# Patient Record
Sex: MALE | Race: BLACK OR AFRICAN AMERICAN | NOT HISPANIC OR LATINO | Employment: OTHER | URBAN - METROPOLITAN AREA
[De-identification: names, ages, dates, MRNs, and addresses within clinical notes are randomized per-mention and may not be internally consistent; named-entity substitution may affect disease eponyms.]

---

## 2018-04-19 ENCOUNTER — OFFICE VISIT (OUTPATIENT)
Dept: URGENT CARE | Facility: CLINIC | Age: 63
End: 2018-04-19
Payer: COMMERCIAL

## 2018-04-19 VITALS
HEART RATE: 88 BPM | TEMPERATURE: 98 F | OXYGEN SATURATION: 99 % | HEIGHT: 66 IN | BODY MASS INDEX: 30.53 KG/M2 | WEIGHT: 190 LBS | RESPIRATION RATE: 18 BRPM | DIASTOLIC BLOOD PRESSURE: 76 MMHG | SYSTOLIC BLOOD PRESSURE: 152 MMHG

## 2018-04-19 DIAGNOSIS — M54.12 CERVICAL RADICULOPATHY: Primary | ICD-10-CM

## 2018-04-19 DIAGNOSIS — R07.9 CHEST PAIN, UNSPECIFIED TYPE: ICD-10-CM

## 2018-04-19 DIAGNOSIS — Z76.0 ENCOUNTER FOR MEDICATION REFILL: ICD-10-CM

## 2018-04-19 PROCEDURE — 96372 THER/PROPH/DIAG INJ SC/IM: CPT | Mod: S$GLB,,, | Performed by: EMERGENCY MEDICINE

## 2018-04-19 PROCEDURE — 99203 OFFICE O/P NEW LOW 30 MIN: CPT | Mod: 25,S$GLB,, | Performed by: NURSE PRACTITIONER

## 2018-04-19 RX ORDER — PRAVASTATIN SODIUM 20 MG/1
20 TABLET ORAL NIGHTLY
Qty: 30 TABLET | Refills: 0 | Status: SHIPPED | OUTPATIENT
Start: 2018-04-19 | End: 2018-05-28 | Stop reason: ALTCHOICE

## 2018-04-19 RX ORDER — TAMSULOSIN HYDROCHLORIDE 0.4 MG/1
0.4 CAPSULE ORAL DAILY
Qty: 30 CAPSULE | Refills: 0 | Status: SHIPPED | OUTPATIENT
Start: 2018-04-19 | End: 2018-04-19 | Stop reason: SDUPTHER

## 2018-04-19 RX ORDER — BETAMETHASONE SODIUM PHOSPHATE AND BETAMETHASONE ACETATE 3; 3 MG/ML; MG/ML
9 INJECTION, SUSPENSION INTRA-ARTICULAR; INTRALESIONAL; INTRAMUSCULAR; SOFT TISSUE
Status: COMPLETED | OUTPATIENT
Start: 2018-04-19 | End: 2018-04-19

## 2018-04-19 RX ORDER — PRAVASTATIN SODIUM 20 MG/1
20 TABLET ORAL NIGHTLY
Qty: 30 TABLET | Refills: 0 | Status: SHIPPED | OUTPATIENT
Start: 2018-04-19 | End: 2018-04-19 | Stop reason: SDUPTHER

## 2018-04-19 RX ORDER — AMLODIPINE BESYLATE 5 MG/1
5 TABLET ORAL DAILY
Qty: 30 TABLET | Refills: 0 | Status: SHIPPED | OUTPATIENT
Start: 2018-04-19 | End: 2018-06-22 | Stop reason: SDUPTHER

## 2018-04-19 RX ORDER — METHOCARBAMOL 500 MG/1
500 TABLET, FILM COATED ORAL 4 TIMES DAILY PRN
Qty: 30 TABLET | Refills: 0 | Status: SHIPPED | OUTPATIENT
Start: 2018-04-19 | End: 2018-04-29

## 2018-04-19 RX ORDER — LORATADINE 10 MG/1
10 TABLET ORAL DAILY
Qty: 30 TABLET | Refills: 0 | Status: SHIPPED | OUTPATIENT
Start: 2018-04-19 | End: 2018-12-19 | Stop reason: ALTCHOICE

## 2018-04-19 RX ORDER — KETOROLAC TROMETHAMINE 30 MG/ML
30 INJECTION, SOLUTION INTRAMUSCULAR; INTRAVENOUS
Status: COMPLETED | OUTPATIENT
Start: 2018-04-19 | End: 2018-04-19

## 2018-04-19 RX ORDER — AMLODIPINE BESYLATE 5 MG/1
5 TABLET ORAL DAILY
Qty: 30 TABLET | Refills: 0 | Status: SHIPPED | OUTPATIENT
Start: 2018-04-19 | End: 2018-04-19 | Stop reason: SDUPTHER

## 2018-04-19 RX ORDER — NAPROXEN 500 MG/1
500 TABLET ORAL 2 TIMES DAILY WITH MEALS
Qty: 20 TABLET | Refills: 0 | Status: SHIPPED | OUTPATIENT
Start: 2018-04-19 | End: 2018-04-29

## 2018-04-19 RX ORDER — METHYLPREDNISOLONE 4 MG/1
TABLET ORAL
Qty: 1 PACKAGE | Refills: 0 | Status: SHIPPED | OUTPATIENT
Start: 2018-04-19 | End: 2018-04-19 | Stop reason: SDUPTHER

## 2018-04-19 RX ORDER — METHYLPREDNISOLONE 4 MG/1
TABLET ORAL
Qty: 1 PACKAGE | Refills: 0 | Status: SHIPPED | OUTPATIENT
Start: 2018-04-19 | End: 2018-05-21

## 2018-04-19 RX ORDER — TAMSULOSIN HYDROCHLORIDE 0.4 MG/1
0.4 CAPSULE ORAL DAILY
Qty: 30 CAPSULE | Refills: 0 | Status: SHIPPED | OUTPATIENT
Start: 2018-04-19 | End: 2018-07-23 | Stop reason: SDUPTHER

## 2018-04-19 RX ORDER — METHOCARBAMOL 500 MG/1
500 TABLET, FILM COATED ORAL 4 TIMES DAILY PRN
Qty: 30 TABLET | Refills: 0 | Status: SHIPPED | OUTPATIENT
Start: 2018-04-19 | End: 2018-04-19 | Stop reason: SDUPTHER

## 2018-04-19 RX ORDER — NAPROXEN 500 MG/1
500 TABLET ORAL 2 TIMES DAILY WITH MEALS
Qty: 20 TABLET | Refills: 0 | Status: SHIPPED | OUTPATIENT
Start: 2018-04-19 | End: 2018-04-19 | Stop reason: SDUPTHER

## 2018-04-19 RX ADMIN — BETAMETHASONE SODIUM PHOSPHATE AND BETAMETHASONE ACETATE 9 MG: 3; 3 INJECTION, SUSPENSION INTRA-ARTICULAR; INTRALESIONAL; INTRAMUSCULAR; SOFT TISSUE at 02:04

## 2018-04-19 RX ADMIN — KETOROLAC TROMETHAMINE 30 MG: 30 INJECTION, SOLUTION INTRAMUSCULAR; INTRAVENOUS at 02:04

## 2018-04-19 NOTE — PATIENT INSTRUCTIONS
EKG was normal today.   Medication refilled for 1 month until you are seen in primary care. Please attend this visit.     Gas x to help with gas.   Zantac OTC for any acid reflux.     Cervical radiculopathy:   Start Naprosyn and Medrol dose maryam tomorrow. Take Naprosyn with food.   Robaxin may cause drowsiness.     Please return here or go to the Emergency Department for any concerns or worsening of condition.  If you were prescribed antibiotics, please take them to completion.  If you were prescribed a narcotic medication, do not drive or operate heavy equipment or machinery while taking these medications.  Please follow up with your primary care doctor or specialist as needed.    If you  smoke, please stop smoking.      Pinched Nerve in the Neck  A pinched nerve in the neck (cervical radiculopathy) is caused when the nerve that goes from the spinal cord to the neck or arm is irritated or has pressure on it. This may be caused by a bulging spinal disk. A spinal disk is the cushion between each spinal bone. Or it may be caused by a narrowing of the spinal joint because of osteoarthritis and wear and tear from repeated injuries.  A pinched nerve can cause numbness, tingling, deep aching, or electrical shooting pain from the side of the neck all the way down to the fingers on one side.  A pinched nerve may start after a sudden turning or bending force (such as in a car accident) or after a simple awkward movement. In either case, muscle spasm is commonly present and adds to the pain.  Home care  Follow these guidelines when caring for yourself at home:  · Rest and relax the muscles. Use a comfortable pillow that supports your head and keeps your spine in a natural (neutral) position. Your head shouldnt be tilted forward or backward. A rolled-up towel may help for a custom fit. When standing or sitting, keep your neck in line with your body. Keep your head up and shoulders down. Stay away from activities that require  you to move your neck a lot.  · You can use heat and massage to help ease the pain. Take a hot shower or bath, or use a heating pad. You can also use a cold pack for relief. You can make a cold pack by wrapping a plastic bag of crushed or cubed ice in a thin towel. Try both heat and cold, and use the method that feels best. Do this for 20 minutes several times a day.  · You may use acetaminophen or ibuprofen to control pain, unless another pain medicine was prescribed. If you have chronic liver or kidney disease, talk with your healthcare provider before using these medicines. Also talk with your provider if youve had a stomach ulcer or gastrointestinal bleeding.  · Reduce stress. Stress can make it longer for your pain to go away.  · Do any exercises or stretches that were given to you as part of your discharge plan.  · Wear a soft collar, if prescribed.  · Physical therapy and massages are known to help.  · You may need surgery for a more serious injury.  Follow-up care  Follow up with your healthcare provider, or as advised, if you dont start to get better after 1 week. You may need more tests. Tell your provider about any fever, chills, or weight loss.  If X-rays were taken, a radiologist may look at them. You will be told of any new findings that may affect your care.  When to seek medical advice  Call your healthcare provider right away if any of these occur:  · Pain becomes worse even after taking prescribed pain medicine  · Weakness in the arm or legs  · Numbness in the arm gets worse  · Trouble breathing or swallowing  Date Last Reviewed: 5/1/2017 © 2000-2017 [employer]. [address] [place] [address], [place] [address]. All rights reserved. This information is not intended as a substitute for professional medical care. Always follow your healthcare professional's instructions.        Neck Sprain or Strain  A sudden force that causes turning or bending of the neck can cause sprain or strain. An  example would be the force from a car accident. This can stretch or tear muscles called a strain. It can also stretch or tear ligaments called a sprain. Either of these can cause neck pain. Sometimes neck pain occurs after a simple awkward movement. In either case, muscle spasm is commonly present and contributes to the pain.     Unless you had a forceful physical injury (for example, a car accident or fall), X-rays are usually not ordered for the initial evaluation of neck pain. If pain continues and dose not respond to medical treatment, X-rays and other tests may be performed at a later time.  Home care  · You may feel more soreness and spasm the first few days after the injury. Rest until symptoms begin to improve.  · When lying down, use a comfortable pillow or a rolled towel that supports the head and keeps the spine in a neutral position. The position of the head should not be tilted forward or backward.  · Apply an ice pack over the injured area for 15 to 20 minutes every 3 to 6 hours. You should do this for the first 24 to 48 hours. You can make an ice pack by filling a plastic bag that seals at the top with ice cubes and then wrapping it with a thin towel. After 48 hours, apply heat (warm shower or warm bath) for 15 to 20 minutes several times a day, or alternate ice and heat.  · You may use over-the-counter pain medicine to control pain, unless another pain medicine was prescribed. If you have chronic liver or kidney disease or ever had a stomach ulcer or GI bleeding, talk with your healthcare provider before using these medicines.  · If a soft cervical collar was prescribed, it should be worn only for periods of increased pain. It should not be worn for more than 3 hours a day, or for a period longer than 1 to 2 weeks.  Follow-up care  Follow up with your healthcare provider as directed. Physical therapy may be needed.  Sometimes fractures dont show up on the first X-ray. Bruises and sprains can  sometimes hurt as much as a fracture. These injuries can take time to heal completely. If your symptoms dont improve or they get worse, talk with your healthcare provider. You may need a repeat X-ray or other tests. If X-rays were taken, you will be told of any new findings that may affect your care.  Call 911  Call 911 if you have:  · Neck swelling, difficulty or painful swallowing  · Difficulty breathing  · Chest pain  When to seek medical advice  Call your healthcare provider right away if any of these occur:  · Pain becomes worse or spreads into your arms  · Weakness or numbness in one or both arms  Date Last Reviewed: 11/19/2015 © 2000-2017 [employer]. [address] [place] [address], [place] [address]. All rights reserved. This information is not intended as a substitute for professional medical care. Always follow your healthcare professional's instructions.        Noncardiac Chest Pain    Based on your visit today, the healthcare provider doesnt know what is causing your chest pain. In most cases, people who come to the emergency department with chest pain dont have a problem with their heart. Instead, the pain is caused by other conditions. It's important for the healthcare team to be sure you are not having a life threatening cause for chest pain such as a heart attack, blood clot in the lungs, collapsed lung, ruptured esophagus, or tearing of the aorta. Once these major causes have been ruled out, you may have further evaluation for non-heart causes of chest pain. These may be problems with the lungs, muscles, bones, digestive tract, nerves, or mental health.  Lung problems  · Inflammation around the lungs (pleurisy)  · Collapsed lung (pneumothorax)  · Fluid around the lungs (pleural effusion)  · Lung cancer (a rare cause of chest pain)  Muscle or bone problems  · Inflamed cartilage between the ribs (costochondritis)  · Fibromyalgia  · Rheumatoid arthritis  · Chest wall strain  Digestive system  problems  · Reflux  · Stomach ulcer  · Spasms of the esophagus  · Gall stones  · Gallbladder inflammation  Mental health conditions  · Panic or anxiety attacks  · Emotional distress  Your condition doesnt seem serious and your pain doesnt appear to be coming from your heart. But sometimes the signs of a serious problem take more time to appear. Watch for the warning signs listed below.  Home care  Follow these guidelines when caring for yourself at home:  · Rest today and avoid strenuous activity.  · Take any prescribed medicine as directed.  Follow-up care  Follow up with your healthcare provider, or as advised, if you dont start to feel better within 24 hours.  When to seek medical advice  Call your healthcare provider right away if any of these occur:  · A change in the type of pain. Call if it feels different, becomes more serious, lasts longer, or begins to spread into your shoulder, arm, neck, jaw, or back.  · Shortness of breath  · You feel more pain when you breathe  · Cough with dark-colored mucus or blood  · Weakness, dizziness, or fainting  · Fever of 100.4ºF (38ºC) or higher, or as directed by your healthcare provider  · Swelling, pain, or redness in one leg  Date Last Reviewed: 12/1/2016  © 1880-1750 [employer]. [address] [place] [address], [place] [address]. All rights reserved. This information is not intended as a substitute for professional medical care. Always follow your healthcare professional's instructions.

## 2018-04-19 NOTE — PROGRESS NOTES
"Subjective:       Patient ID: Aaron Guerra is a 63 y.o. male.    Vitals:  height is 5' 6" (1.676 m) and weight is 86.2 kg (190 lb). His oral temperature is 97.8 °F (36.6 °C). His blood pressure is 152/76 (abnormal) and his pulse is 88. His respiration is 18 and oxygen saturation is 99%.     Chief Complaint: Arm Pain (left arm)    Patient states the pain came at night, feels pain in muscles, does not spread to any other part of body, hurts when left arm is abducted. Onset 3/30/2018    He is also c.o intermittent left sided cp that is relieved with belching.       Arm Pain    The incident occurred more than 1 week ago. There was no injury mechanism. The pain is present in the left elbow and left shoulder (left humerus). The quality of the pain is described as aching. The pain does not radiate. The pain is at a severity of 6/10. The pain is moderate. The pain has been constant since the incident. Associated symptoms include chest pain (left sided CP- achy- improves with belching ) and muscle weakness. The symptoms are aggravated by movement and lifting. He has tried nothing (aspirin alleve) for the symptoms. The treatment provided no relief.     Review of Systems   Constitution: Negative for chills, fever, weakness and malaise/fatigue.   HENT: Negative for sore throat.    Eyes: Negative for blurred vision.   Cardiovascular: Positive for chest pain (left sided CP- achy- improves with belching ). Negative for dyspnea on exertion and leg swelling.   Respiratory: Negative for shortness of breath.    Skin: Negative for rash.   Musculoskeletal: Positive for joint pain and myalgias. Negative for back pain.   Gastrointestinal: Positive for flatus. Negative for abdominal pain, diarrhea, nausea and vomiting.   Neurological: Negative for headaches.   Psychiatric/Behavioral: The patient is not nervous/anxious.        Objective:      Physical Exam   Constitutional: He is oriented to person, place, and time. He appears well-developed " and well-nourished. No distress.   HENT:   Head: Normocephalic and atraumatic.   Right Ear: External ear normal.   Left Ear: External ear normal.   Nose: Nose normal.   Mouth/Throat: Mucous membranes are normal.   Eyes: Conjunctivae, EOM and lids are normal. Pupils are equal, round, and reactive to light.   Neck: Trachea normal and full passive range of motion without pain. Neck supple. Muscular tenderness present. No spinous process tenderness present. No neck rigidity. Decreased range of motion (pain with neck movements. ) present. No edema and no erythema present.       Cardiovascular: Normal rate, regular rhythm and normal heart sounds.    Pulmonary/Chest: Effort normal and breath sounds normal. No respiratory distress.   Abdominal: Soft. Normal appearance and bowel sounds are normal. He exhibits no distension, no abdominal bruit, no pulsatile midline mass and no mass. There is no tenderness. There is no rigidity, no rebound, no guarding, no tenderness at McBurney's point and negative Dodson's sign.   Belching during the visit.    Musculoskeletal: He exhibits no edema.        Cervical back: He exhibits decreased range of motion (pain ), pain and spasm. He exhibits no bony tenderness.        Back:    Pain to left sdied neck radiates down to left shoulder and left elbow.    Neurological: He is alert and oriented to person, place, and time. He has normal strength.   Skin: Skin is warm, dry and intact. He is not diaphoretic. No pallor.   Psychiatric: He has a normal mood and affect. His speech is normal and behavior is normal. Judgment and thought content normal. Cognition and memory are normal.   Nursing note and vitals reviewed.      Assessment:       1. Cervical radiculopathy    2. Encounter for medication refill    3. Chest pain, unspecified type        Plan:         Cervical radiculopathy  -     ketorolac injection 30 mg; Inject 1 mL (30 mg total) into the muscle one time.  -     betamethasone  "acetate-betamethasone sodium phosphate injection 9 mg; Inject 1.5 mLs (9 mg total) into the muscle one time.  -     methocarbamol (ROBAXIN) 500 MG Tab; Take 1 tablet (500 mg total) by mouth 4 (four) times daily as needed (muscle spasm).  Dispense: 30 tablet; Refill: 0  -     methylPREDNISolone (MEDROL, KASH,) 4 mg tablet; use as directed  Dispense: 1 Package; Refill: 0  -     naproxen (NAPROSYN) 500 MG tablet; Take 1 tablet (500 mg total) by mouth 2 (two) times daily with meals.  Dispense: 20 tablet; Refill: 0    Encounter for medication refill  -     pravastatin (PRAVACHOL) 20 MG tablet; Take 1 tablet (20 mg total) by mouth every evening.  Dispense: 30 tablet; Refill: 0  -     amLODIPine (NORVASC) 5 MG tablet; Take 1 tablet (5 mg total) by mouth once daily.  Dispense: 30 tablet; Refill: 0  -     tamsulosin (FLOMAX) 0.4 mg Cp24; Take 1 capsule (0.4 mg total) by mouth once daily.  Dispense: 30 capsule; Refill: 0  -     loratadine (CLARITIN) 10 mg tablet; Take 1 tablet (10 mg total) by mouth once daily.  Dispense: 30 tablet; Refill: 0    Chest pain, unspecified type  -     EKG 12-lead      EKG in clinic today shows NSR. No ST depression or elevation.   HR 79.   No EKG to compare it to.     Discussed that CP is likely not cardiac related. No SOB. Pain is relieved with belching. + belching during exam. No abd pain, N/V/D or fever.  Described as "achy pain" Discussed follow up if CP changes  In characteristic or intensity.   Discuss this is likely r/t gas.     Medications refilled today per patient request. He reports he is establishing care with PCP next month. Appointment visibile in [employer]. Has medication bottles with him today. verified prior to sending rx. Discussed PCP follow up.       Patient Instructions   EKG was normal today.   Medication refilled for 1 month until you are seen in primary care. Please attend this visit.     Gas x to help with gas.   Zantac OTC for any acid reflux.     Cervical radiculopathy: "   Start Naprosyn and Medrol dose maryam tomorrow. Take Naprosyn with food.   Robaxin may cause drowsiness.     Please return here or go to the Emergency Department for any concerns or worsening of condition.  If you were prescribed antibiotics, please take them to completion.  If you were prescribed a narcotic medication, do not drive or operate heavy equipment or machinery while taking these medications.  Please follow up with your primary care doctor or specialist as needed.    If you  smoke, please stop smoking.      Pinched Nerve in the Neck  A pinched nerve in the neck (cervical radiculopathy) is caused when the nerve that goes from the spinal cord to the neck or arm is irritated or has pressure on it. This may be caused by a bulging spinal disk. A spinal disk is the cushion between each spinal bone. Or it may be caused by a narrowing of the spinal joint because of osteoarthritis and wear and tear from repeated injuries.  A pinched nerve can cause numbness, tingling, deep aching, or electrical shooting pain from the side of the neck all the way down to the fingers on one side.  A pinched nerve may start after a sudden turning or bending force (such as in a car accident) or after a simple awkward movement. In either case, muscle spasm is commonly present and adds to the pain.  Home care  Follow these guidelines when caring for yourself at home:  · Rest and relax the muscles. Use a comfortable pillow that supports your head and keeps your spine in a natural (neutral) position. Your head shouldnt be tilted forward or backward. A rolled-up towel may help for a custom fit. When standing or sitting, keep your neck in line with your body. Keep your head up and shoulders down. Stay away from activities that require you to move your neck a lot.  · You can use heat and massage to help ease the pain. Take a hot shower or bath, or use a heating pad. You can also use a cold pack for relief. You can make a cold pack by  wrapping a plastic bag of crushed or cubed ice in a thin towel. Try both heat and cold, and use the method that feels best. Do this for 20 minutes several times a day.  · You may use acetaminophen or ibuprofen to control pain, unless another pain medicine was prescribed. If you have chronic liver or kidney disease, talk with your healthcare provider before using these medicines. Also talk with your provider if youve had a stomach ulcer or gastrointestinal bleeding.  · Reduce stress. Stress can make it longer for your pain to go away.  · Do any exercises or stretches that were given to you as part of your discharge plan.  · Wear a soft collar, if prescribed.  · Physical therapy and massages are known to help.  · You may need surgery for a more serious injury.  Follow-up care  Follow up with your healthcare provider, or as advised, if you dont start to get better after 1 week. You may need more tests. Tell your provider about any fever, chills, or weight loss.  If X-rays were taken, a radiologist may look at them. You will be told of any new findings that may affect your care.  When to seek medical advice  Call your healthcare provider right away if any of these occur:  · Pain becomes worse even after taking prescribed pain medicine  · Weakness in the arm or legs  · Numbness in the arm gets worse  · Trouble breathing or swallowing  Date Last Reviewed: 5/1/2017 © 2000-2017 [employer]. [address] [place] [id number]. All rights reserved. This information is not intended as a substitute for professional medical care. Always follow your healthcare professional's instructions.        Neck Sprain or Strain  A sudden force that causes turning or bending of the neck can cause sprain or strain. An example would be the force from a car accident. This can stretch or tear muscles called a strain. It can also stretch or tear ligaments called a sprain. Either of these can cause neck pain. Sometimes neck  pain occurs after a simple awkward movement. In either case, muscle spasm is commonly present and contributes to the pain.     Unless you had a forceful physical injury (for example, a car accident or fall), X-rays are usually not ordered for the initial evaluation of neck pain. If pain continues and dose not respond to medical treatment, X-rays and other tests may be performed at a later time.  Home care  · You may feel more soreness and spasm the first few days after the injury. Rest until symptoms begin to improve.  · When lying down, use a comfortable pillow or a rolled towel that supports the head and keeps the spine in a neutral position. The position of the head should not be tilted forward or backward.  · Apply an ice pack over the injured area for 15 to 20 minutes every 3 to 6 hours. You should do this for the first 24 to 48 hours. You can make an ice pack by filling a plastic bag that seals at the top with ice cubes and then wrapping it with a thin towel. After 48 hours, apply heat (warm shower or warm bath) for 15 to 20 minutes several times a day, or alternate ice and heat.  · You may use over-the-counter pain medicine to control pain, unless another pain medicine was prescribed. If you have chronic liver or kidney disease or ever had a stomach ulcer or GI bleeding, talk with your healthcare provider before using these medicines.  · If a soft cervical collar was prescribed, it should be worn only for periods of increased pain. It should not be worn for more than 3 hours a day, or for a period longer than 1 to 2 weeks.  Follow-up care  Follow up with your healthcare provider as directed. Physical therapy may be needed.  Sometimes fractures dont show up on the first X-ray. Bruises and sprains can sometimes hurt as much as a fracture. These injuries can take time to heal completely. If your symptoms dont improve or they get worse, talk with your healthcare provider. You may need a repeat X-ray or other  tests. If X-rays were taken, you will be told of any new findings that may affect your care.  Call 911  Call 911 if you have:  · Neck swelling, difficulty or painful swallowing  · Difficulty breathing  · Chest pain  When to seek medical advice  Call your healthcare provider right away if any of these occur:  · Pain becomes worse or spreads into your arms  · Weakness or numbness in one or both arms  Date Last Reviewed: 11/19/2015  © 2012-7598 [employer]. [address] [place] [id number]. All rights reserved. This information is not intended as a substitute for professional medical care. Always follow your healthcare professional's instructions.        Noncardiac Chest Pain    Based on your visit today, the healthcare provider doesnt know what is causing your chest pain. In most cases, people who come to the emergency department with chest pain dont have a problem with their heart. Instead, the pain is caused by other conditions. It's important for the healthcare team to be sure you are not having a life threatening cause for chest pain such as a heart attack, blood clot in the lungs, collapsed lung, ruptured esophagus, or tearing of the aorta. Once these major causes have been ruled out, you may have further evaluation for non-heart causes of chest pain. These may be problems with the lungs, muscles, bones, digestive tract, nerves, or mental health.  Lung problems  · Inflammation around the lungs (pleurisy)  · Collapsed lung (pneumothorax)  · Fluid around the lungs (pleural effusion)  · Lung cancer (a rare cause of chest pain)  Muscle or bone problems  · Inflamed cartilage between the ribs (costochondritis)  · Fibromyalgia  · Rheumatoid arthritis  · Chest wall strain  Digestive system problems  · Reflux  · Stomach ulcer  · Spasms of the esophagus  · Gall stones  · Gallbladder inflammation  Mental health conditions  · Panic or anxiety attacks  · Emotional distress  Your condition doesnt seem  serious and your pain doesnt appear to be coming from your heart. But sometimes the signs of a serious problem take more time to appear. Watch for the warning signs listed below.  Home care  Follow these guidelines when caring for yourself at home:  · Rest today and avoid strenuous activity.  · Take any prescribed medicine as directed.  Follow-up care  Follow up with your healthcare provider, or as advised, if you dont start to feel better within 24 hours.  When to seek medical advice  Call your healthcare provider right away if any of these occur:  · A change in the type of pain. Call if it feels different, becomes more serious, lasts longer, or begins to spread into your shoulder, arm, neck, jaw, or back.  · Shortness of breath  · You feel more pain when you breathe  · Cough with dark-colored mucus or blood  · Weakness, dizziness, or fainting  · Fever of 100.4ºF (38ºC) or higher, or as directed by your healthcare provider  · Swelling, pain, or redness in one leg  Date Last Reviewed: 12/1/2016  © 6757-5484 [employer]. [address] [place] [address], [place] [address]. All rights reserved. This information is not intended as a substitute for professional medical care. Always follow your healthcare professional's instructions.

## 2018-05-21 ENCOUNTER — HOSPITAL ENCOUNTER (OUTPATIENT)
Dept: RADIOLOGY | Facility: HOSPITAL | Age: 63
Discharge: HOME | End: 2018-05-21
Attending: FAMILY MEDICINE
Payer: COMMERCIAL

## 2018-05-21 ENCOUNTER — OFFICE VISIT (OUTPATIENT)
Dept: FAMILY MEDICINE | Facility: CLINIC | Age: 63
End: 2018-05-21
Attending: FAMILY MEDICINE
Payer: COMMERCIAL

## 2018-05-21 VITALS
BODY MASS INDEX: 30.65 KG/M2 | WEIGHT: 195.31 LBS | HEIGHT: 67 IN | OXYGEN SATURATION: 97 % | HEART RATE: 84 BPM | DIASTOLIC BLOOD PRESSURE: 88 MMHG | SYSTOLIC BLOOD PRESSURE: 132 MMHG

## 2018-05-21 DIAGNOSIS — I10 ESSENTIAL HYPERTENSION: Primary | ICD-10-CM

## 2018-05-21 DIAGNOSIS — M75.22 BICIPITAL TENDINITIS OF LEFT SHOULDER: ICD-10-CM

## 2018-05-21 DIAGNOSIS — M47.22 CERVICAL SPONDYLOSIS WITH RADICULOPATHY: ICD-10-CM

## 2018-05-21 DIAGNOSIS — E78.00 HYPERCHOLESTEREMIA: ICD-10-CM

## 2018-05-21 DIAGNOSIS — J30.9 ALLERGIC RHINITIS, UNSPECIFIED SEASONALITY, UNSPECIFIED TRIGGER: ICD-10-CM

## 2018-05-21 DIAGNOSIS — K63.5 POLYP OF COLON, UNSPECIFIED PART OF COLON, UNSPECIFIED TYPE: ICD-10-CM

## 2018-05-21 DIAGNOSIS — R35.1 BENIGN PROSTATIC HYPERPLASIA WITH NOCTURIA: ICD-10-CM

## 2018-05-21 DIAGNOSIS — Z00.00 LABORATORY EXAM ORDERED AS PART OF ROUTINE GENERAL MEDICAL EXAMINATION: ICD-10-CM

## 2018-05-21 DIAGNOSIS — N40.1 BENIGN PROSTATIC HYPERPLASIA WITH NOCTURIA: ICD-10-CM

## 2018-05-21 DIAGNOSIS — Z11.59 NEED FOR HEPATITIS C SCREENING TEST: ICD-10-CM

## 2018-05-21 DIAGNOSIS — R06.81 WITNESSED APNEIC SPELLS: ICD-10-CM

## 2018-05-21 DIAGNOSIS — R06.83 SNORING: ICD-10-CM

## 2018-05-21 LAB
CREAT UR-MCNC: 149 MG/DL
MICROALBUMIN UR DL<=1MG/L-MCNC: 9 UG/ML
MICROALBUMIN/CREATININE RATIO: 6 UG/MG

## 2018-05-21 PROCEDURE — 3075F SYST BP GE 130 - 139MM HG: CPT | Mod: CPTII,S$GLB,, | Performed by: FAMILY MEDICINE

## 2018-05-21 PROCEDURE — 90715 TDAP VACCINE 7 YRS/> IM: CPT | Mod: S$GLB,,, | Performed by: FAMILY MEDICINE

## 2018-05-21 PROCEDURE — 72040 X-RAY EXAM NECK SPINE 2-3 VW: CPT | Mod: 26,,, | Performed by: RADIOLOGY

## 2018-05-21 PROCEDURE — 73030 X-RAY EXAM OF SHOULDER: CPT | Mod: 26,LT,, | Performed by: RADIOLOGY

## 2018-05-21 PROCEDURE — 99999 PR PBB SHADOW E&M-EST. PATIENT-LVL IV: CPT | Mod: PBBFAC,,, | Performed by: FAMILY MEDICINE

## 2018-05-21 PROCEDURE — 82043 UR ALBUMIN QUANTITATIVE: CPT

## 2018-05-21 PROCEDURE — 73030 X-RAY EXAM OF SHOULDER: CPT | Mod: TC,PO,LT

## 2018-05-21 PROCEDURE — 72040 X-RAY EXAM NECK SPINE 2-3 VW: CPT | Mod: TC,PO

## 2018-05-21 PROCEDURE — 90471 IMMUNIZATION ADMIN: CPT | Mod: S$GLB,,, | Performed by: FAMILY MEDICINE

## 2018-05-21 PROCEDURE — 3079F DIAST BP 80-89 MM HG: CPT | Mod: CPTII,S$GLB,, | Performed by: FAMILY MEDICINE

## 2018-05-21 PROCEDURE — 3008F BODY MASS INDEX DOCD: CPT | Mod: CPTII,S$GLB,, | Performed by: FAMILY MEDICINE

## 2018-05-21 PROCEDURE — 99205 OFFICE O/P NEW HI 60 MIN: CPT | Mod: 25,S$GLB,, | Performed by: FAMILY MEDICINE

## 2018-05-21 RX ORDER — INDOMETHACIN 50 MG/1
50 CAPSULE ORAL 2 TIMES DAILY
COMMUNITY
End: 2018-07-11

## 2018-05-21 NOTE — PROGRESS NOTES
Subjective:       Patient ID: Aaron Guerra is a 63 y.o. male.    Chief Complaint: Establish Care    HPI  The patient presents today for first visit with me.  He is transferring care from [facility].  He was seen at [facility] with left shoulder pain; diagnosed with cervical radiculopathy, and Rx'd Medrol dosepack, IM/po NSAIDs, and muscle relaxer.  He did not improvement x 3 days, but symptoms returned.      Patient Active Problem List   Diagnosis    Hypertension    Colon polyp    Allergic rhinitis    Benign prostatic hyperplasia with lower urinary tract symptoms    Hypercholesteremia    Snoring    Witnessed apneic spells       Past Surgical History:   Procedure Laterality Date    COLONOSCOPY N/A 12/1/2015    Procedure: COLONOSCOPY;  Surgeon: Meagan Hill MD;  Location: [facility] ([address] [place] [address]);  Service: Endoscopy;  Laterality: N/A;    TONSILLECTOMY           Current Outpatient Prescriptions:     amLODIPine (NORVASC) 5 MG tablet, Take 1 tablet (5 mg total) by mouth once daily., Disp: 30 tablet, Rfl: 0    ascorbate calcium (VITAMIN C ORAL), Take 1 tablet by mouth once daily., Disp: , Rfl:     aspirin 81 MG Chew, Take 81 mg by mouth once daily., Disp: , Rfl:     fluticasone (FLONASE) 50 mcg/actuation nasal spray, 2 sprays by Each Nare route once daily., Disp: , Rfl:     indomethacin (INDOCIN) 50 MG capsule, Take 50 mg by mouth 2 (two) times daily., Disp: , Rfl:     loratadine (CLARITIN) 10 mg tablet, Take 1 tablet (10 mg total) by mouth once daily., Disp: 30 tablet, Rfl: 0    pravastatin (PRAVACHOL) 20 MG tablet, Take 1 tablet (20 mg total) by mouth every evening., Disp: 30 tablet, Rfl: 0    tamsulosin (FLOMAX) 0.4 mg Cp24, Take 1 capsule (0.4 mg total) by mouth once daily., Disp: 30 capsule, Rfl: 0    vitamin E acetate (VITAMIN E ORAL), Take 1 tablet by mouth once daily., Disp: , Rfl:     varicella-zoster [id number], PF, (SHINGRIX, PF,) 50 mcg/0.5 mL injection, Inject 0.5 mLs into the muscle once.,  Disp: 0.5 mL, Rfl: 0    Review of patient's allergies indicates:   Allergen Reactions    No known drug allergies        Family History   Problem Relation Age of Onset    Cataracts Mother     Diabetes Mother     Hypertension Mother     Stroke Mother     Heart attack Sister     Drug abuse Brother     Hypertension Brother     Diabetes Sister     Hypertension Brother     Hypertension Sister        Social History     Social History    Marital status:      Spouse name: N/A    Number of children: 2    Years of education: N/A     Occupational History    sales      Social History Main Topics    Smoking status: Never Smoker    Smokeless tobacco: Never Used    Alcohol use No    Drug use: No    Sexual activity: Yes     Partners: Female     Other Topics Concern    Not on file     Social History Narrative    The patient does not exercise regularly ().      He is not satisfied with weight.    Rates diet as good.    He does drink at least 1/2 gallon water daily.    He drinks 2 coffee/tea/caffeine-containing soft drinks daily.    Total sleep time at night is 4 hours.    He does wear seat belts.    Hobbies include none.               Patient Care Team:  Lincoln Villalta Jr., MD as PCP - General (Family Medicine)  Faith Beaver MD as Consulting Physician (Urology)    Review of Systems   Constitutional: Negative for fatigue and unexpected weight change.   HENT: Negative for ear discharge, ear pain, hearing loss, tinnitus and voice change.    Eyes: Negative for pain.   Respiratory: Negative for cough and shortness of breath.    Cardiovascular: Negative for chest pain, palpitations and leg swelling.   Gastrointestinal: Negative for abdominal pain, blood in stool, constipation, diarrhea, nausea and vomiting.   Genitourinary: Negative for decreased urine volume, difficulty urinating, dysuria, enuresis, frequency, hematuria and urgency.        Nocturia x 4   Musculoskeletal: Positive for arthralgias (left shoulder,  left knee). Negative for back pain and myalgias.   Skin: Negative for rash.   Neurological: Negative for dizziness, weakness, light-headedness and headaches.   Hematological: Does not bruise/bleed easily.   Psychiatric/Behavioral: Positive for sleep disturbance (snores; witnessed apneic spells). Negative for dysphoric mood. The patient is not nervous/anxious.        Objective:      Physical Exam   Constitutional: He is oriented to person, place, and time. He appears well-developed and well-nourished. He is cooperative.   HENT:   Head: Normocephalic and atraumatic.   Right Ear: External ear normal.   Left Ear: External ear normal.   Nose: Nose normal.   Mouth/Throat: Oropharynx is clear and moist and mucous membranes are normal. No oropharyngeal exudate.   Eyes: Conjunctivae are normal. No scleral icterus.   Neck: Neck supple. No JVD present. Carotid bruit is not present. No thyromegaly present.   Cardiovascular: Normal rate, regular rhythm, normal heart sounds and normal pulses.  Exam reveals no gallop and no friction rub.    No murmur heard.  Pulmonary/Chest: Effort normal and breath sounds normal. He has no wheezes. He has no rhonchi. He has no rales.   Abdominal: Soft. Bowel sounds are normal. He exhibits no distension and no mass. There is no splenomegaly or hepatomegaly. There is no tenderness.   Musculoskeletal: He exhibits no edema.        Left shoulder: He exhibits tenderness (anteriorly). He exhibits no crepitus.        Left knee: He exhibits abnormal alignment (bowing of lower legs bilaterally). He exhibits no effusion and no bony tenderness. No tenderness found.        Cervical back: He exhibits decreased range of motion, pain and spasm. He exhibits no tenderness and no bony tenderness.   ROM Cervical Spine:  flexion to 45 degrees, extension to 15 degrees, left rotation to 30 degrees, right rotation to 30 degrees, left lateral bending to 15 degrees and right lateral bending to 15 degrees  Midline  "Tenderness: absent midline  Paraspinous tenderness: mild bilaterally  UE Neurologic Exam:  Unremarkable  Positive Spurling's maneuver on left.  No signs of impingement left shoulder; positive Speed test.   Lymphadenopathy:     He has no cervical adenopathy.     He has no axillary adenopathy.   Neurological: He is alert and oriented to person, place, and time. He has normal strength and normal reflexes. No cranial nerve deficit or sensory deficit. Coordination normal.   Skin: Skin is warm and dry.   Psychiatric: He has a normal mood and affect.   Vitals reviewed.        Assessment:       1. Essential hypertension    2. Benign prostatic hyperplasia with nocturia    3. Polyp of colon, unspecified part of colon, unspecified type    4. Allergic rhinitis, unspecified seasonality, unspecified trigger    5. Hypercholesteremia    6. Snoring    7. Witnessed apneic spells    8. Cervical spondylosis with radiculopathy    9. Need for hepatitis C screening test    10. Laboratory exam ordered as part of routine general medical examination        Plan:       Request previous records.  Update Health Maintenance at that time.  Discussed routine examinations and screenings.   Rx for Shingrix sent to patient's pharmacy.   TdaP today.  Cervical spine and left shoulder films films.  Refer to Sleep Med.  Discussed with patient the importance of lifestyle modifications, including well-balanced diet and moderate exercise regimen, in reducing risk for cardiovascular/cerebrovascular disease and diabetes.  We will call the patient with results & make further recommendations at that time.        "This note will not be shared with the patient."  "

## 2018-05-21 NOTE — PROGRESS NOTES
Patient was given Tdap IM in Left Deltoid as per orders from Dr. Villalta. Aseptic tech used and pt tolerated well. Pt was monitored for 15 mins with no reaction noted.

## 2018-05-24 ENCOUNTER — PATIENT MESSAGE (OUTPATIENT)
Dept: FAMILY MEDICINE | Facility: CLINIC | Age: 63
End: 2018-05-24

## 2018-05-24 PROBLEM — Z91.89 FRAMINGHAM CARDIAC RISK 10-20% IN NEXT 10 YEARS: Status: ACTIVE | Noted: 2018-05-24

## 2018-05-26 ENCOUNTER — TELEPHONE (OUTPATIENT)
Dept: FAMILY MEDICINE | Facility: CLINIC | Age: 63
End: 2018-05-26

## 2018-05-26 DIAGNOSIS — E78.00 HYPERCHOLESTEREMIA: ICD-10-CM

## 2018-05-26 DIAGNOSIS — M47.812 DJD (DEGENERATIVE JOINT DISEASE), CERVICAL: ICD-10-CM

## 2018-05-26 DIAGNOSIS — D50.9 MICROCYTIC ANEMIA: ICD-10-CM

## 2018-05-26 DIAGNOSIS — Z91.89 AT RISK FOR CORONARY ARTERY DISEASE: ICD-10-CM

## 2018-05-26 DIAGNOSIS — M19.012 PRIMARY OSTEOARTHRITIS OF LEFT SHOULDER: ICD-10-CM

## 2018-05-26 DIAGNOSIS — R97.20 ELEVATED PSA: ICD-10-CM

## 2018-05-26 DIAGNOSIS — I10 ESSENTIAL HYPERTENSION: ICD-10-CM

## 2018-05-26 DIAGNOSIS — Z91.89 FRAMINGHAM CARDIAC RISK 10-20% IN NEXT 10 YEARS: Primary | ICD-10-CM

## 2018-05-26 NOTE — TELEPHONE ENCOUNTER
Please inform patient that x-ray of shoulder shows mild arthritis, while show some moderate to severe arthritic changes.  Recommending physical therapy evaluation.  Also, patient has not yet read my email concerning his laboratory test results.

## 2018-05-28 PROBLEM — R97.20 ELEVATED PSA: Status: ACTIVE | Noted: 2018-05-28

## 2018-05-28 PROBLEM — D50.9 MICROCYTIC ANEMIA: Status: ACTIVE | Noted: 2018-05-28

## 2018-05-28 RX ORDER — ATORVASTATIN CALCIUM 40 MG/1
40 TABLET, FILM COATED ORAL DAILY
Qty: 30 TABLET | Refills: 2 | Status: SHIPPED | OUTPATIENT
Start: 2018-05-28 | End: 2018-08-31 | Stop reason: SDUPTHER

## 2018-05-28 NOTE — TELEPHONE ENCOUNTER
Patient states that he is unable to log into his patient portal.  I discuss his test results in detail and patient is in agreement with everything.  Patient would like to have his CT tomorrow if possible because his wife is scheduled to have a CT at 12:15pm.  Patient would also like to start cholesterol medication.

## 2018-05-29 ENCOUNTER — PATIENT MESSAGE (OUTPATIENT)
Dept: FAMILY MEDICINE | Facility: CLINIC | Age: 63
End: 2018-05-29

## 2018-05-29 ENCOUNTER — HOSPITAL ENCOUNTER (OUTPATIENT)
Dept: RADIOLOGY | Facility: HOSPITAL | Age: 63
Discharge: HOME | End: 2018-05-29
Attending: FAMILY MEDICINE
Payer: COMMERCIAL

## 2018-05-29 DIAGNOSIS — E78.00 HYPERCHOLESTEREMIA: ICD-10-CM

## 2018-05-29 DIAGNOSIS — Z91.89 AT RISK FOR CORONARY ARTERY DISEASE: ICD-10-CM

## 2018-05-29 DIAGNOSIS — Z91.89 FRAMINGHAM CARDIAC RISK 10-20% IN NEXT 10 YEARS: ICD-10-CM

## 2018-05-29 DIAGNOSIS — I10 ESSENTIAL HYPERTENSION: ICD-10-CM

## 2018-05-29 PROCEDURE — 75571 CT HRT W/O DYE W/CA TEST: CPT | Mod: 26,,, | Performed by: RADIOLOGY

## 2018-05-29 PROCEDURE — 75571 CT HRT W/O DYE W/CA TEST: CPT | Mod: TC

## 2018-05-30 ENCOUNTER — PATIENT MESSAGE (OUTPATIENT)
Dept: FAMILY MEDICINE | Facility: CLINIC | Age: 63
End: 2018-05-30

## 2018-05-30 PROBLEM — D56.3 BETA THALASSEMIA MINOR: Status: ACTIVE | Noted: 2018-05-28

## 2018-06-21 ENCOUNTER — OFFICE VISIT (OUTPATIENT)
Dept: SLEEP MEDICINE | Facility: CLINIC | Age: 63
End: 2018-06-21
Attending: FAMILY MEDICINE
Payer: COMMERCIAL

## 2018-06-21 VITALS
SYSTOLIC BLOOD PRESSURE: 160 MMHG | HEART RATE: 77 BPM | HEIGHT: 67 IN | BODY MASS INDEX: 30.45 KG/M2 | DIASTOLIC BLOOD PRESSURE: 99 MMHG | WEIGHT: 194 LBS

## 2018-06-21 DIAGNOSIS — G47.30 SLEEP APNEA, UNSPECIFIED TYPE: Primary | ICD-10-CM

## 2018-06-21 DIAGNOSIS — E66.9 OBESITY (BMI 30.0-34.9): ICD-10-CM

## 2018-06-21 PROCEDURE — 3080F DIAST BP >= 90 MM HG: CPT | Mod: CPTII,S$GLB,, | Performed by: NURSE PRACTITIONER

## 2018-06-21 PROCEDURE — 3077F SYST BP >= 140 MM HG: CPT | Mod: CPTII,S$GLB,, | Performed by: NURSE PRACTITIONER

## 2018-06-21 PROCEDURE — 99999 PR PBB SHADOW E&M-EST. PATIENT-LVL IV: CPT | Mod: PBBFAC,,, | Performed by: NURSE PRACTITIONER

## 2018-06-21 PROCEDURE — 3008F BODY MASS INDEX DOCD: CPT | Mod: CPTII,S$GLB,, | Performed by: NURSE PRACTITIONER

## 2018-06-21 PROCEDURE — 99203 OFFICE O/P NEW LOW 30 MIN: CPT | Mod: S$GLB,,, | Performed by: NURSE PRACTITIONER

## 2018-06-21 NOTE — PATIENT INSTRUCTIONS
Barbara or Dinesh will contact you to schedule your sleep study. Their number is [phone] (ext 2). The [facility] is located on 7th floor of the [facility].    We will call you when the sleep study results are ready - if you have not heard from us by 2 weeks from the date of the study, please call [phone] (ext 1).    You are advised to abstain from driving should you feel sleepy or drowsy.

## 2018-06-21 NOTE — PROGRESS NOTES
Aaron Guerra  was seen as a new patient at the request of  Lincoln Villalta Jr., MD for the evaluation of obstructive sleep apnea.    CHIEF COMPLAINT:    Chief Complaint   Patient presents with    Sleep Apnea       06/21/2018 MOY Velarde NP: Initial HISTORY OF PRESENT ILLNESS: Aaron Guerra is a 63 y.o. male is here for sleep evaluation.       Patient complaints include: snoring, interrupted sleep, witnessed apneic spells, and excessive daytime sleepiness.  Denies difficulty initiating sleep, but has difficulty with sleep maintenance. Nocturia up to 4x.     Denies symptoms of restless legs or kicking during sleep.    Occupation:retired     EPWORTH SLEEPINESS SCALE 6/21/2018   Sitting and reading 1   Watching TV 1   Sitting, inactive in a public place (e.g. a theatre or a meeting) 2   As a passenger in a car for an hour without a break 0   Lying down to rest in the afternoon when circumstances permit 1   Sitting and talking to someone 1   Sitting quietly after a lunch without alcohol 3   In a car, while stopped for a few minutes in traffic 0   Total score 9       SLEEP ROUTINE:  Sleep Clinic New Patient 6/21/2018   What time do you go to bed on a week day? (Give a range) 10:30pm - 11pm   What time do you go to bed on a day off? (Give a range) do not work. 10:30pm,    How long does it take you to fall asleep? (Give a range) 20 min    On average, how many times per night do you wake up? 4   How long does it take you to fall back into sleep? (Give a range) 20-30 min    What time do you wake up to start your day on a week day? (Give a range) 6:30am -7 :00am    What time do you wake up to start your day on a day off? (Give a range) 6:30am- 7:00am    What time do you get out of bed? (Give a range) 6:30am-7am    On average, how many hours do you sleep? 3-5   On average, how many naps do you take per day? 2   Rate your sleep quality from 0 to 5 (0-poor, 5-great). 2   Have you experienced:  Weight gain?   How much weight have you lost  or gained (in lbs.) in the last year? 12   On average, how many times per night do you go to the bathroom?  3-4   Have you ever had a sleep study/CPAP machine/surgery for sleep apnea? No   Have you ever had a CPAP machine for sleep apnea? No   Have you ever had surgery for sleep apnea? No         PAST MEDICAL HISTORY:    Active Ambulatory Problems     Diagnosis Date Noted    Hypertension     Colon polyp 05/21/2018    Allergic rhinitis 05/21/2018    Benign prostatic hyperplasia with lower urinary tract symptoms 05/21/2018    Hypercholesteremia 05/21/2018    Snoring 05/21/2018    Witnessed apneic spells 05/21/2018    [place] cardiac risk 10-20% in next 10 years 05/24/2018    Primary osteoarthritis of left shoulder 05/26/2018    DJD (degenerative joint disease), cervical 05/26/2018    Elevated PSA 05/28/2018    Beta thalassemia minor 05/28/2018     Resolved Ambulatory Problems     Diagnosis Date Noted    No Resolved Ambulatory Problems     Past Medical History:   Diagnosis Date    Arthritis     BPH (benign prostatic hypertrophy)     Cataract     Hypertension 2012                PAST SURGICAL HISTORY:    Past Surgical History:   Procedure Laterality Date    COLONOSCOPY N/A 12/1/2015    Procedure: COLONOSCOPY;  Surgeon: Meagan Hill MD;  Location: [address];  Service: Endoscopy;  Laterality: N/A;    TONSILLECTOMY           FAMILY HISTORY:                Family History   Problem Relation Age of Onset    Cataracts Mother     Diabetes Mother     Hypertension Mother     Stroke Mother     Heart attack Sister     Drug abuse Brother     Hypertension Brother     Diabetes Sister     Hypertension Brother     Hypertension Sister        SOCIAL HISTORY:          Tobacco:   History   Smoking Status    Never Smoker   Smokeless Tobacco    Never Used       Alcohol use:    History   Alcohol Use No                 ALLERGIES:    Review of patient's allergies indicates:   Allergen Reactions     "No known drug allergies        CURRENT MEDICATIONS:    Current Outpatient Prescriptions   Medication Sig Dispense Refill    amLODIPine (NORVASC) 5 MG tablet Take 1 tablet (5 mg total) by mouth once daily. 30 tablet 0    ascorbate calcium (VITAMIN C ORAL) Take 1 tablet by mouth once daily.      aspirin 81 MG Chew Take 81 mg by mouth once daily.      atorvastatin (LIPITOR) 40 MG tablet Take 1 tablet (40 mg total) by mouth once daily. 30 tablet 2    fluticasone (FLONASE) 50 mcg/actuation nasal spray 2 sprays by Each Nare route once daily.      indomethacin (INDOCIN) 50 MG capsule Take 50 mg by mouth 2 (two) times daily.      loratadine (CLARITIN) 10 mg tablet Take 1 tablet (10 mg total) by mouth once daily. 30 tablet 0    tamsulosin (FLOMAX) 0.4 mg Cp24 Take 1 capsule (0.4 mg total) by mouth once daily. 30 capsule 0    vitamin E acetate (VITAMIN E ORAL) Take 1 tablet by mouth once daily.       No current facility-administered medications for this visit.                   REVIEW OF SYSTEMS:     Sleep related symptoms as per HPI.  Sleep Clinic ROS  6/21/2018   Difficulty breathing through the nose?  Sometimes   Sore throat or dry mouth in the morning? Sometimes   Irregular or very fast heart beat?  No   Shortness of breath?  No   Acid reflux? Yes   Body aches and pains?  Yes   Morning headaches? Yes   Dizziness? No   Mood changes?  No   Do you exercise?  No   Do you feel like moving your legs a lot?  No       Otherwise, a balance of systems reviewed is negative.          PHYSICAL EXAM:  Vitals:    06/21/18 0906   BP: (!) 160/99   Pulse: 77   Weight: 88 kg (194 lb)   Height: 5' 6.5" (1.689 m)   PainSc: 0-No pain     Body mass index is 30.84 kg/m².     GENERAL: Overweight development, well groomed  HEENT:  Conjunctivae are non-erythematous; Pupils equal, round, and reactive to light; Nose is symmetrical; Nasal mucosa is pink and moist; Septum is midline; Inferior turbinates are normal; Nasal airflow is normal; " Posterior pharynx is pink; Modified Mallampati: IV; Posterior palate is normal; Tonsils surgically absent; Uvula is normal and pink;Tongue is normal; Dentition is fair; No TMJ tenderness; Jaw opening and protrusion without click and without discomfort.  NECK: Supple. Neck circumference is 15 inches. No thyromegaly. No palpable nodes.    SKIN: On face and neck: No abrasions, no rashes, no lesions.  No subcutaneous nodules are palpable.  RESPIRATORY: Chest is clear to auscultation.  Normal chest expansion and non-labored breathing at rest.  CARDIOVASCULAR: Normal S1, S2.  No murmurs, gallops or rubs. No carotid bruits bilaterally.  EXTREMITIES: No edema. No clubbing. No cyanosis. Station normal. Gait normal.        NEURO/PSYCH: Oriented to time, place and person. Normal attention span and concentration. Affect is full. Mood is normal.                                              ASSESSMENT:    Sleep apnea, unspecified. The patient symptomatically has snoring, interrupted sleep, witnessed apneic spells, and excessive daytime sleepiness with findings of crowded oral airway and elevated body mass index. Medical co-morbidities: systemic HTN and obesity. This warrants further investigation for possible obstructive sleep apnea.      Hx tonsillectomy     Seasonal allergies, takes Claritin at bedtime prn basis    PLAN:    Diagnostic: HST. The nature of this procedure and its indication was discussed with the patient. Discussed with patient that if HST is negative or depending on severity of positive HST, in-lab sleep study may be necessary. Patient will be contacted after sleep study is done.  RTC to discuss results.     Pt to contact PCP regarding elevated BP today     Education: During our discussion today, we talked about the etiology of obstructive sleep apnea as well as the potential ramifications of untreated sleep apnea, which could include daytime sleepiness, hypertension, heart disease and/or stroke. We discussed  potential treatment options, which could include weight loss, body positioning, continuous positive airway pressure (CPAP), OA, EPAP, or referral for surgical consideration.     Precautions: The patient was advised to abstain from driving should they feel sleepy  or drowsy.     Thank you for allowing me the opportunity to participate in the care of your patient.

## 2018-06-21 NOTE — LETTER
June 21, 2018      Lincoln Villalta Jr., MD  [address]  [address]  [facility] [address]           [facility]  [address]  [facility] [address]  Phone: [phone]          Patient: Aaron Guerra   MR Number: [id number]   YOB: 1955   Date of Visit: 6/21/2018       Dear Dr. Lincoln Villalta Jr.:    Thank you for referring Aaron Guerra to me for evaluation. Attached you will find relevant portions of my assessment and plan of care.    If you have questions, please do not hesitate to call me. I look forward to following Aaron Guerra along with you.    Sincerely,    Tiffany Velarde, IMELDA    Enclosure  CC:  No Recipients    If you would like to receive this communication electronically, please contact [email or web address] or [phone] to request more information on [facility] access.    For providers and/or their staff who would like to refer a patient to [facility], please contact us through our one-stop-shop provider referral line, [facility] , at [phone].    If you feel you have received this communication in error or would no longer like to receive these types of communications, please e-mail [email or web address]

## 2018-06-22 ENCOUNTER — TELEPHONE (OUTPATIENT)
Dept: FAMILY MEDICINE | Facility: CLINIC | Age: 63
End: 2018-06-22

## 2018-06-22 DIAGNOSIS — Z76.0 ENCOUNTER FOR MEDICATION REFILL: ICD-10-CM

## 2018-06-22 RX ORDER — AMLODIPINE BESYLATE 5 MG/1
5 TABLET ORAL DAILY
Qty: 90 TABLET | Refills: 1 | Status: SHIPPED | OUTPATIENT
Start: 2018-06-22 | End: 2018-12-30 | Stop reason: SDUPTHER

## 2018-06-22 NOTE — TELEPHONE ENCOUNTER
Patient is scheduled for BP Check. Patient states no one instructed him to discontinue his BP med. The patient states she no longer sees his previous physician and assumed Dr. Villlata was going to refill his Amlodpine 5 mg tab.

## 2018-06-22 NOTE — TELEPHONE ENCOUNTER
----- Message from Diana Mathews sent at 6/22/2018 11:25 AM CDT -----            Name of Who is Calling:KEITH DIAMOND [[id number]]    What is the request in detail: pt went to the sleep center yesterday and was told his BP was high and that he should go back on the meds, therefore he need a RX called in  [address] [phone] (Phone)  [phone] (Fax)      Can the clinic reply by [facility]: no    What Number to Call Back if not in [facility]: [phone]

## 2018-06-22 NOTE — TELEPHONE ENCOUNTER
Would you like for the patient to come in for a BP check on the nurse schedule sometime this week?

## 2018-06-22 NOTE — TELEPHONE ENCOUNTER
In the short term, yes.  However, patient did not respond to my email from last month concerning his test results and my recommendations.    Please have him come in for blood pressure check, and also schedule appointment with me.  Who instructed the patient to discontinue his blood pressure medications?

## 2018-06-25 ENCOUNTER — CLINICAL SUPPORT (OUTPATIENT)
Dept: FAMILY MEDICINE | Facility: CLINIC | Age: 63
End: 2018-06-25
Payer: COMMERCIAL

## 2018-06-25 VITALS — HEART RATE: 79 BPM | OXYGEN SATURATION: 98 % | DIASTOLIC BLOOD PRESSURE: 90 MMHG | SYSTOLIC BLOOD PRESSURE: 160 MMHG

## 2018-06-25 DIAGNOSIS — I10 ESSENTIAL HYPERTENSION: Primary | ICD-10-CM

## 2018-06-25 PROCEDURE — 99999 PR PBB SHADOW E&M-EST. PATIENT-LVL I: CPT | Mod: PBBFAC,,,

## 2018-06-25 PROCEDURE — 99211 OFF/OP EST MAY X REQ PHY/QHP: CPT | Mod: S$GLB,,, | Performed by: FAMILY MEDICINE

## 2018-06-25 RX ORDER — LISINOPRIL 5 MG/1
5 TABLET ORAL DAILY
Qty: 30 TABLET | Refills: 0 | Status: SHIPPED | OUTPATIENT
Start: 2018-06-25 | End: 2018-09-18

## 2018-06-25 NOTE — ADDENDUM NOTE
Addended by: CHELY LORENZO JR. on: 6/25/2018 05:29 PM     Modules accepted: Orders, Level of Service

## 2018-06-25 NOTE — PROGRESS NOTES
Patient blood pressure remains uncontrolled.  Given some very mild renal insufficiency, I will add lisinopril 5 mg daily to the patient's medication regimen.  He will continue amlodipine 5 mg daily, with tamsulosin 0.4 mg at bedtime.    Return to clinic again in 3-4 weeks for blood pressure check.  Continue to monitor pressure at home, and call if blood pressure falls below 110/60.

## 2018-06-25 NOTE — PROGRESS NOTES
Aaron Guerra 63 y.o. male is here today for Blood Pressure check.   History of HTN yes.    Review of patient's allergies indicates:   Allergen Reactions    No known drug allergies      Creatinine   Date Value Ref Range Status   05/21/2018 1.3 0.5 - 1.4 mg/dL Final     Sodium   Date Value Ref Range Status   05/21/2018 141 136 - 145 mmol/L Final     Potassium   Date Value Ref Range Status   05/21/2018 4.4 3.5 - 5.1 mmol/L Final   ]  Patient denies taking blood pressure medications on a regular bases at the same time of the day.     Current Outpatient Prescriptions:     amLODIPine (NORVASC) 5 MG tablet, Take 1 tablet (5 mg total) by mouth once daily., Disp: 90 tablet, Rfl: 1    ascorbate calcium (VITAMIN C ORAL), Take 1 tablet by mouth once daily., Disp: , Rfl:     aspirin 81 MG Chew, Take 81 mg by mouth once daily., Disp: , Rfl:     atorvastatin (LIPITOR) 40 MG tablet, Take 1 tablet (40 mg total) by mouth once daily., Disp: 30 tablet, Rfl: 2    fluticasone (FLONASE) 50 mcg/actuation nasal spray, 2 sprays by Each Nare route once daily., Disp: , Rfl:     indomethacin (INDOCIN) 50 MG capsule, Take 50 mg by mouth 2 (two) times daily., Disp: , Rfl:     loratadine (CLARITIN) 10 mg tablet, Take 1 tablet (10 mg total) by mouth once daily., Disp: 30 tablet, Rfl: 0    tamsulosin (FLOMAX) 0.4 mg Cp24, Take 1 capsule (0.4 mg total) by mouth once daily., Disp: 30 capsule, Rfl: 0    vitamin E acetate (VITAMIN E ORAL), Take 1 tablet by mouth once daily., Disp: , Rfl:   Does patient have record of home blood pressure readings no. Readings have been averaging 160/99 at the office visit with Sleep Medicine.   Last dose of blood pressure medication was taken at N/A patient has not started his BP med that was refilled on 06/22/18.  Patient is asymptomatic.   Complains of N/A.    BP: (!) 160/90 , Pulse: 79.    Blood pressure reading after 15 minutes was 152/90, Pulse 79. Patient is scheduled for follow up visit with Dr. Villalta on  07/11/18  Dr. Villalta notified.

## 2018-06-26 ENCOUNTER — TELEPHONE (OUTPATIENT)
Dept: FAMILY MEDICINE | Facility: CLINIC | Age: 63
End: 2018-06-26

## 2018-06-26 NOTE — TELEPHONE ENCOUNTER
Spoke with the patient to inform him that Dr. Villalta would like him to add 5 MG Lisinopril to hs medication regimen. Patient was informed to take both 5 MG amlodipine and 5MG Lisinopril together daily. Patient has already been scheduled for an office visit on 07/11/18.     Patient states he does not have anymore Tamsulosin 0.4 MG. That medication was given to him by an NP at [facility].

## 2018-06-26 NOTE — TELEPHONE ENCOUNTER
Attempted to contact the patient to discuss Dr. Villalta's message and recommendations in regards to his blood pressure check on yesterday. The patient did not answer. Left a voicemail to return call.

## 2018-06-26 NOTE — TELEPHONE ENCOUNTER
Attempted to return the patient's phone call to discuss Dr. Villalta's message. Patient did not answer. Left voicemail to return call.

## 2018-06-28 ENCOUNTER — TELEPHONE (OUTPATIENT)
Dept: SLEEP MEDICINE | Facility: OTHER | Age: 63
End: 2018-06-28

## 2018-07-03 ENCOUNTER — TELEPHONE (OUTPATIENT)
Dept: SLEEP MEDICINE | Facility: OTHER | Age: 63
End: 2018-07-03

## 2018-07-05 ENCOUNTER — HOSPITAL ENCOUNTER (OUTPATIENT)
Dept: SLEEP MEDICINE | Facility: OTHER | Age: 63
Discharge: HOME | End: 2018-07-05
Attending: NURSE PRACTITIONER
Payer: COMMERCIAL

## 2018-07-05 DIAGNOSIS — G47.30 SLEEP APNEA, UNSPECIFIED TYPE: ICD-10-CM

## 2018-07-05 DIAGNOSIS — G47.33 OSA (OBSTRUCTIVE SLEEP APNEA): ICD-10-CM

## 2018-07-05 PROCEDURE — 95800 SLP STDY UNATTENDED: CPT | Mod: 26,,, | Performed by: PSYCHIATRY & NEUROLOGY

## 2018-07-05 PROCEDURE — 95800 SLP STDY UNATTENDED: CPT

## 2018-07-11 ENCOUNTER — OFFICE VISIT (OUTPATIENT)
Dept: FAMILY MEDICINE | Facility: CLINIC | Age: 63
End: 2018-07-11
Attending: FAMILY MEDICINE
Payer: COMMERCIAL

## 2018-07-11 VITALS
OXYGEN SATURATION: 97 % | HEART RATE: 90 BPM | HEIGHT: 67 IN | DIASTOLIC BLOOD PRESSURE: 70 MMHG | SYSTOLIC BLOOD PRESSURE: 134 MMHG | WEIGHT: 196.69 LBS | BODY MASS INDEX: 30.87 KG/M2

## 2018-07-11 DIAGNOSIS — N40.1 BENIGN PROSTATIC HYPERPLASIA WITH NOCTURIA: ICD-10-CM

## 2018-07-11 DIAGNOSIS — I10 ESSENTIAL HYPERTENSION: ICD-10-CM

## 2018-07-11 DIAGNOSIS — R91.1 PULMONARY NODULE: ICD-10-CM

## 2018-07-11 DIAGNOSIS — R97.20 ELEVATED PSA: ICD-10-CM

## 2018-07-11 DIAGNOSIS — R35.1 BENIGN PROSTATIC HYPERPLASIA WITH NOCTURIA: ICD-10-CM

## 2018-07-11 DIAGNOSIS — Z91.89 FRAMINGHAM CARDIAC RISK 10-20% IN NEXT 10 YEARS: ICD-10-CM

## 2018-07-11 DIAGNOSIS — G47.33 OSA (OBSTRUCTIVE SLEEP APNEA): Primary | ICD-10-CM

## 2018-07-11 DIAGNOSIS — D56.3 BETA THALASSEMIA MINOR: ICD-10-CM

## 2018-07-11 DIAGNOSIS — E78.00 HYPERCHOLESTEREMIA: ICD-10-CM

## 2018-07-11 PROCEDURE — 3078F DIAST BP <80 MM HG: CPT | Mod: CPTII,S$GLB,, | Performed by: FAMILY MEDICINE

## 2018-07-11 PROCEDURE — 3075F SYST BP GE 130 - 139MM HG: CPT | Mod: CPTII,S$GLB,, | Performed by: FAMILY MEDICINE

## 2018-07-11 PROCEDURE — 99213 OFFICE O/P EST LOW 20 MIN: CPT | Mod: S$GLB,,, | Performed by: FAMILY MEDICINE

## 2018-07-11 PROCEDURE — 99999 PR PBB SHADOW E&M-EST. PATIENT-LVL III: CPT | Mod: PBBFAC,,, | Performed by: FAMILY MEDICINE

## 2018-07-11 PROCEDURE — 3008F BODY MASS INDEX DOCD: CPT | Mod: CPTII,S$GLB,, | Performed by: FAMILY MEDICINE

## 2018-07-11 NOTE — PROGRESS NOTES
Subjective:       Patient ID: Aaron Guerra is a 63 y.o. male.    Chief Complaint: Follow-up (labs)    HPI    The patient returns to the office today for blood pressure track, and follow up from previous visits.  His pressure is controlled today.    He is also being evaluated for obstructive sleep apnea by sleep medicine.  His last visit with Ms. Velarde was last week.    His coronary calcium score was 322.  He is on a statin, as well as 2 antihypertensives.  We will recheck laboratory investigation again in early September.  Will also consider cardiac stress test at that time.  He is presently asymptomatic during his exercise regimen.    The low dose CT scan also showed a small nodule in the lower left lobe of the lung.  This was an incidental finding.  Since the patient is low risk, this is of questionable clinical significance.  In an abundance of caution, I will refer to a pulmonologist for evaluation.    He has a history of benign prostatic hypertrophy, and was previously under the care of Dr. Faith Beaver at [facility].  He remembers having a prostatic biopsy.  His most recent PSA was 6.4 ng/mL, consistent with a 1 a chance of having significant prostate cancer on biopsy.  I will refer to Urology for further evaluation and possible prostatic ultrasound with biopsy.    Hemoglobin electrophoresis was consistent with beta thalassemia minor.  No further evaluation is warranted.    Patient Active Problem List   Diagnosis    Hypertension    Colon polyp    Allergic rhinitis    Benign prostatic hyperplasia with lower urinary tract symptoms    Hypercholesteremia    ANGELIC (obstructive sleep apnea)    [place] cardiac risk 10-20% in next 10 years    Primary osteoarthritis of left shoulder    DJD (degenerative joint disease), cervical    Elevated PSA    Beta thalassemia minor    Pulmonary nodule       Current Outpatient Prescriptions:     amLODIPine (NORVASC) 5 MG tablet, Take 1 tablet (5 mg total) by mouth once  "daily., Disp: 90 tablet, Rfl: 1    ascorbate calcium (VITAMIN C ORAL), Take 1 tablet by mouth once daily., Disp: , Rfl:     aspirin 81 MG Chew, Take 81 mg by mouth once daily., Disp: , Rfl:     atorvastatin (LIPITOR) 40 MG tablet, Take 1 tablet (40 mg total) by mouth once daily., Disp: 30 tablet, Rfl: 2    fluticasone (FLONASE) 50 mcg/actuation nasal spray, 2 sprays by Each Nare route once daily., Disp: , Rfl:     lisinopril (PRINIVIL,ZESTRIL) 5 MG tablet, Take 1 tablet (5 mg total) by mouth once daily., Disp: 30 tablet, Rfl: 0    loratadine (CLARITIN) 10 mg tablet, Take 1 tablet (10 mg total) by mouth once daily., Disp: 30 tablet, Rfl: 0    tamsulosin (FLOMAX) 0.4 mg Cp24, Take 1 capsule (0.4 mg total) by mouth once daily., Disp: 30 capsule, Rfl: 0    vitamin E acetate (VITAMIN E ORAL), Take 1 tablet by mouth once daily., Disp: , Rfl:     The following portions of the patient's history were reviewed and updated as appropriate: allergies, past family history, past medical history, past social history and past surgical history    Review of Systems    Other than history of present illness, noncontributory.    Objective:       Physical Exam      /88 (BP Location: Left arm, Patient Position: Sitting, BP Method: Small (Manual))   Pulse 90   Ht 5' 6.5" (1.689 m)   Wt 89.2 kg (196 lb 11.2 oz)   SpO2 97%   BMI 31.27 kg/m²     The entirety of today's visit was devoted to counseling.  The visit lasted 20 minutes.    Assessment:       1. ANGELIC (obstructive sleep apnea)    2. Essential hypertension    3. Hypercholesteremia    4. [place] cardiac risk 10-20% in next 10 years    5. Pulmonary nodule    6. Beta thalassemia minor    7. Benign prostatic hyperplasia with nocturia    8. Elevated PSA          Plan:       Obtain records from Dr. Faith Beaver at [facility].  Continue with lifestyle changes, and present medications.  Refer to Pulmonology and Urology.  Recheck labs in early September, 2018.      "This note will " "not be shared with the patient."  "

## 2018-07-16 ENCOUNTER — TELEPHONE (OUTPATIENT)
Dept: SLEEP MEDICINE | Facility: CLINIC | Age: 63
End: 2018-07-16

## 2018-07-16 NOTE — TELEPHONE ENCOUNTER
Please notify pt that 07/05/2018 HST results available. Schedule for follow-up to discuss results.

## 2018-07-23 ENCOUNTER — OFFICE VISIT (OUTPATIENT)
Dept: UROLOGY | Facility: CLINIC | Age: 63
End: 2018-07-23
Attending: FAMILY MEDICINE
Payer: COMMERCIAL

## 2018-07-23 VITALS
HEART RATE: 88 BPM | HEIGHT: 67 IN | DIASTOLIC BLOOD PRESSURE: 88 MMHG | WEIGHT: 196 LBS | SYSTOLIC BLOOD PRESSURE: 131 MMHG | BODY MASS INDEX: 30.76 KG/M2

## 2018-07-23 DIAGNOSIS — R97.20 ELEVATED PSA: Primary | ICD-10-CM

## 2018-07-23 DIAGNOSIS — Z76.0 ENCOUNTER FOR MEDICATION REFILL: ICD-10-CM

## 2018-07-23 DIAGNOSIS — N40.1 BENIGN NON-NODULAR PROSTATIC HYPERPLASIA WITH LOWER URINARY TRACT SYMPTOMS: ICD-10-CM

## 2018-07-23 PROCEDURE — 51798 US URINE CAPACITY MEASURE: CPT | Mod: S$GLB,,, | Performed by: UROLOGY

## 2018-07-23 PROCEDURE — 81002 URINALYSIS NONAUTO W/O SCOPE: CPT | Mod: S$GLB,,, | Performed by: UROLOGY

## 2018-07-23 PROCEDURE — 99244 OFF/OP CNSLTJ NEW/EST MOD 40: CPT | Mod: 25,S$GLB,, | Performed by: UROLOGY

## 2018-07-23 RX ORDER — TAMSULOSIN HYDROCHLORIDE 0.4 MG/1
0.4 CAPSULE ORAL DAILY
Qty: 30 CAPSULE | Refills: 11 | Status: SHIPPED | OUTPATIENT
Start: 2018-07-23 | End: 2018-12-04

## 2018-07-23 NOTE — PROGRESS NOTES
"Subjective:      Aaron Guerra is a 63 y.o. male who was referred by Lincoln Villalta Jr., MD for evaluation of BPH.      Benign Prostatic Hypertrophy  Patient complains of lower urinary tract symptoms. He reports frequency, incomplete emptying, nocturia three times a night, urgency and weak stream. He denies intermittency and straining. Patient states symptoms are of moderate severity. Onset of symptoms was 5 years ago and was gradual in onset. His AUA Symptom Score is 10/2. He has no family history of prostate cancer. He reports a history of no complicating symptoms. He denies flank pain, gross hematuria, kidney stones and recurrent UTI.    The following portions of the patient's history were reviewed and updated as appropriate: allergies, current medications, past family history, past medical history, past social history, past surgical history and problem list.    Review of Systems  Constitutional: no fever or chills  ENT: no nasal congestion or sore throat  Respiratory: no cough or shortness of breath  Cardiovascular: no chest pain or palpitations  Gastrointestinal: no nausea or vomiting, tolerating diet  Genitourinary: as per HPI  Hematologic/Lymphatic: no easy bruising or lymphadenopathy  Musculoskeletal: no arthralgias or myalgias  Neurological: no seizures or tremors  Behavioral/Psych: no auditory or visual hallucinations     Objective:   Vitals: /88 (BP Location: Left arm, Patient Position: Sitting, BP Method: Large (Automatic))   Pulse 88   Ht 5' 6.5" (1.689 m)   Wt 88.9 kg (196 lb)   BMI 31.16 kg/m²     Physical Exam   General: alert and oriented, no acute distress  Head: normocephalic, atraumatic  Neck: supple, no lymphadenopathy, normal ROM, no masses  Respiratory: Symmetric expansion, non-labored breathing  Cardiovascular: regular rate and rhythm, nomal pulses, no peripheral edema  Abdomen: soft, non tender, non distended, no palpable masses, no hernias, no hepatomegaly or " splenomegaly  Genitourinary:   Penis: normal, no lesions, patent orthotopic meatus, no plaques  Scrotum: no rashes or skin changes;   Testes: descended bilaterally, no masses, nontender, normal epididymides bilaterally, no hydroceles  Prostate: normal for age, normal consistency, non tender, no specific nodules, size: 40 gm; seminal vesicles not palpated  Rectum: normal rectal tone, no rectal mass, normal perineum  Lymphatic: no inguinal nodes  Skin: normal coloration and turgor, no rashes, no suspicious skin lesions noted  Neuro: alert and oriented x3, no gross deficits  Psych: normal judgment and insight, normal mood/affect and non-anxious    Bladder Scan PVR: 24cc      Lab Review   Urinalysis demonstrates negative for all components  Lab Results   Component Value Date    WBC 4.05 05/21/2018    HGB 11.8 (L) 05/21/2018    HCT 39.2 (L) 05/21/2018    MCV 73 (L) 05/21/2018     05/21/2018     Lab Results   Component Value Date    CREATININE 1.3 05/21/2018    BUN 17 05/21/2018     Component      Latest Ref Rng & Units 5/29/2018 5/21/2018   PSA Total      0.00 - 4.00 ng/mL 6.4 (H)    PSA, Free      0.01 - 1.50 ng/mL 1.45    PSA, Free Pct      Not established % 22.66    PSA DIAGNOSTIC      0.00 - 4.00 ng/mL  5.7 (H)       Assessment:     1. Elevated PSA    2. Benign non-nodular prostatic hyperplasia with lower urinary tract symptoms    3. Encounter for medication refill        Plan:   1. Repeat PSA - will need TRUS if still elevated  Discussed other options for treating BPH, including addition of medication such as finasteride and surgical treatments such as Urolift and TURP.  He wishes to proceed w/ workup for Urolift - will schedule cysto NA  Continue flomax pending above

## 2018-07-23 NOTE — LETTER
July 23, 2018      Lincoln Villalta Jr., MD  [address]  [address]  [facility] [address]           [facility]  [address] [place] [address]  [facility] [address]  Phone: [phone]  Fax: [phone]          Patient: Aaron Guerra   MR Number: [id number]   YOB: 1955   Date of Visit: 7/23/2018       Dear Dr. Lincoln Villalta Jr.:    Thank you for referring Aaron Guerra to me for evaluation. Attached you will find relevant portions of my assessment and plan of care.    If you have questions, please do not hesitate to call me. I look forward to following Aaron Guerra along with you.    Sincerely,    Trenton Weathers MD    Enclosure  CC:  No Recipients    If you would like to receive this communication electronically, please contact [email or web address] or [phone] to request more information on [employer] [facility] access.    For providers and/or their staff who would like to refer a patient to [facility], please contact us through our one-stop-shop provider referral line, [facility] [place], at [phone].    If you feel you have received this communication in error or would no longer like to receive these types of communications, please e-mail [email or web address]

## 2018-07-26 ENCOUNTER — PROCEDURE VISIT (OUTPATIENT)
Dept: UROLOGY | Facility: CLINIC | Age: 63
End: 2018-07-26
Attending: UROLOGY
Payer: COMMERCIAL

## 2018-07-26 VITALS
WEIGHT: 196 LBS | BODY MASS INDEX: 30.76 KG/M2 | DIASTOLIC BLOOD PRESSURE: 88 MMHG | SYSTOLIC BLOOD PRESSURE: 142 MMHG | HEART RATE: 91 BPM | HEIGHT: 67 IN

## 2018-07-26 DIAGNOSIS — N40.1 BPH WITH OBSTRUCTION/LOWER URINARY TRACT SYMPTOMS: ICD-10-CM

## 2018-07-26 DIAGNOSIS — N40.1 BENIGN NON-NODULAR PROSTATIC HYPERPLASIA WITH LOWER URINARY TRACT SYMPTOMS: ICD-10-CM

## 2018-07-26 DIAGNOSIS — R97.20 ELEVATED PSA: ICD-10-CM

## 2018-07-26 DIAGNOSIS — N13.8 BPH WITH OBSTRUCTION/LOWER URINARY TRACT SYMPTOMS: ICD-10-CM

## 2018-07-26 LAB
BILIRUB SERPL-MCNC: NORMAL MG/DL
BLOOD URINE, POC: NORMAL
COLOR, POC UA: YELLOW
GLUCOSE UR QL STRIP: NORMAL
KETONES UR QL STRIP: NORMAL
LEUKOCYTE ESTERASE URINE, POC: NORMAL
NITRITE, POC UA: NORMAL
PH, POC UA: 5
POC RESIDUAL URINE VOLUME: 24 ML (ref 0–100)
PROTEIN, POC: NORMAL
SPECIFIC GRAVITY, POC UA: 1.01
UROBILINOGEN, POC UA: NORMAL

## 2018-07-26 PROCEDURE — 52000 CYSTOURETHROSCOPY: CPT | Mod: S$GLB,,, | Performed by: UROLOGY

## 2018-07-26 RX ORDER — CIPROFLOXACIN 2 MG/ML
400 INJECTION, SOLUTION INTRAVENOUS
Status: CANCELLED | OUTPATIENT
Start: 2018-07-26

## 2018-07-26 RX ORDER — LIDOCAINE HYDROCHLORIDE 20 MG/ML
JELLY TOPICAL
Status: COMPLETED | OUTPATIENT
Start: 2018-07-26 | End: 2018-07-26

## 2018-07-26 RX ORDER — CIPROFLOXACIN 500 MG/1
500 TABLET ORAL
Status: COMPLETED | OUTPATIENT
Start: 2018-07-26 | End: 2018-07-26

## 2018-07-26 RX ADMIN — LIDOCAINE HYDROCHLORIDE 5 ML: 20 JELLY TOPICAL at 01:07

## 2018-07-26 RX ADMIN — CIPROFLOXACIN 500 MG: 500 TABLET ORAL at 12:07

## 2018-07-26 NOTE — H&P
"Subjective:      Aaron Guerra is a 63 y.o. male who was referred by Lincoln Villalta Jr., MD for evaluation of BPH.       Benign Prostatic Hypertrophy  Patient complains of lower urinary tract symptoms. He reports frequency, incomplete emptying, nocturia three times a night, urgency and weak stream. He denies intermittency and straining. Patient states symptoms are of moderate severity. Onset of symptoms was 5 years ago and was gradual in onset. His AUA Symptom Score is 10/2. He has no family history of prostate cancer. He reports a history of no complicating symptoms. He denies flank pain, gross hematuria, kidney stones and recurrent UTI.     Cystoscopy 07/26/2018 demonstrates obstruction from lateral lobe hypertrophy.    The following portions of the patient's history were reviewed and updated as appropriate: allergies, current medications, past family history, past medical history, past social history, past surgical history and problem list.     Review of Systems  Constitutional: no fever or chills  ENT: no nasal congestion or sore throat  Respiratory: no cough or shortness of breath  Cardiovascular: no chest pain or palpitations  Gastrointestinal: no nausea or vomiting, tolerating diet  Genitourinary: as per HPI  Hematologic/Lymphatic: no easy bruising or lymphadenopathy  Musculoskeletal: no arthralgias or myalgias  Neurological: no seizures or tremors  Behavioral/Psych: no auditory or visual hallucinations     Objective:   Vitals: /88 (BP Location: Left arm, Patient Position: Sitting, BP Method: Large (Automatic))   Pulse 88   Ht 5' 6.5" (1.689 m)   Wt 88.9 kg (196 lb)   BMI 31.16 kg/m²      Physical Exam   General: alert and oriented, no acute distress  Head: normocephalic, atraumatic  Neck: supple, no lymphadenopathy, normal ROM, no masses  Respiratory: Symmetric expansion, non-labored breathing  Cardiovascular: regular rate and rhythm, nomal pulses, no peripheral edema  Abdomen: soft, non tender, non " distended, no palpable masses, no hernias, no hepatomegaly or splenomegaly  Genitourinary:   Penis: normal, no lesions, patent orthotopic meatus, no plaques  Scrotum: no rashes or skin changes;   Testes: descended bilaterally, no masses, nontender, normal epididymides bilaterally, no hydroceles  Prostate: normal for age, normal consistency, non tender, no specific nodules, size: 40 gm; seminal vesicles not palpated  Rectum: normal rectal tone, no rectal mass, normal perineum  Lymphatic: no inguinal nodes  Skin: normal coloration and turgor, no rashes, no suspicious skin lesions noted  Neuro: alert and oriented x3, no gross deficits  Psych: normal judgment and insight, normal mood/affect and non-anxious     Bladder Scan PVR: 24cc       Lab Review   Urinalysis demonstrates negative for all components        Lab Results   Component Value Date     WBC 4.05 05/21/2018     HGB 11.8 (L) 05/21/2018     HCT 39.2 (L) 05/21/2018     MCV 73 (L) 05/21/2018      05/21/2018            Lab Results   Component Value Date     CREATININE 1.3 05/21/2018     BUN 17 05/21/2018      Component      Latest Ref Rng & Units 7/23/2018 5/29/2018 5/21/2018 2/11/2012   PSA Total      0.00 - 4.00 ng/mL 5.1 (H) 6.4 (H)     PSA, Free      0.01 - 1.50 ng/mL 1.65 (H) 1.45     PSA, Free Pct      Not established % 32.35 22.66     PSA, SCREEN      0 - 4 ng/ml    2.25   PSA DIAGNOSTIC      0.00 - 4.00 ng/mL   5.7 (H)         Assessment:      1. Benign non-nodular prostatic hyperplasia with lower urinary tract symptoms    2. Elevated PSA         Plan:   1. PSA improved with high % free - will monitor and defer biopsy for now  2. Proceed with urolift for BPH

## 2018-07-26 NOTE — PROCEDURES
Cystoscopy  Date/Time: 7/26/2018 12:32 PM  Performed by: CONNOR BARAKAT  Authorized by: CONNOR BARAKAT     Consent Done?:  Yes (Written)  Indications: BPH    Position:  Supine  Anesthesia:  Lidocaine jelly  Preparation: Patient was prepped and draped in usual sterile fashion      Scope type:  Flexible cystoscope  External exam normal: Yes    Urethra normal: Yes    Prostate normal: No (No intravesical or obstructing median lobe)          Hyperplasia (Bilobar)Bladder neck normal: Bladder neck normal   Bladder normal: Yes      Patient tolerance:  Patient tolerated the procedure well with no immediate complications    Impression:  BPH with VILLAFUERTE  Elevated PSA    Plan:  PSA improved with high % free - will observe  Proceed with Urolift

## 2018-08-08 ENCOUNTER — TELEPHONE (OUTPATIENT)
Dept: UROLOGY | Facility: CLINIC | Age: 63
End: 2018-08-08

## 2018-08-08 DIAGNOSIS — N40.1 BPH WITH OBSTRUCTION/LOWER URINARY TRACT SYMPTOMS: Primary | ICD-10-CM

## 2018-08-08 DIAGNOSIS — N13.8 BPH WITH OBSTRUCTION/LOWER URINARY TRACT SYMPTOMS: Primary | ICD-10-CM

## 2018-08-08 NOTE — TELEPHONE ENCOUNTER
Spoke with pt informed him urine culture order. He states that he will have that taken care of on tomorrow after pre-op. Pt was informed that he may can let them know that the order is placed and they may collect it as well.

## 2018-08-13 ENCOUNTER — LAB VISIT (OUTPATIENT)
Dept: LAB | Facility: HOSPITAL | Age: 63
End: 2018-08-13
Attending: UROLOGY
Payer: COMMERCIAL

## 2018-08-13 DIAGNOSIS — N13.8 BPH WITH OBSTRUCTION/LOWER URINARY TRACT SYMPTOMS: ICD-10-CM

## 2018-08-13 DIAGNOSIS — N40.1 BPH WITH OBSTRUCTION/LOWER URINARY TRACT SYMPTOMS: ICD-10-CM

## 2018-08-13 PROCEDURE — 87086 URINE CULTURE/COLONY COUNT: CPT

## 2018-08-14 ENCOUNTER — HOSPITAL ENCOUNTER (OUTPATIENT)
Dept: PREADMISSION TESTING | Facility: OTHER | Age: 63
Discharge: HOME | End: 2018-08-14
Attending: UROLOGY
Payer: COMMERCIAL

## 2018-08-14 ENCOUNTER — ANESTHESIA EVENT (OUTPATIENT)
Dept: SURGERY | Facility: OTHER | Age: 63
End: 2018-08-14
Payer: COMMERCIAL

## 2018-08-14 VITALS
HEIGHT: 66 IN | HEART RATE: 79 BPM | SYSTOLIC BLOOD PRESSURE: 129 MMHG | DIASTOLIC BLOOD PRESSURE: 79 MMHG | WEIGHT: 190 LBS | BODY MASS INDEX: 30.53 KG/M2 | TEMPERATURE: 98 F | OXYGEN SATURATION: 98 %

## 2018-08-14 LAB — BACTERIA UR CULT: NO GROWTH

## 2018-08-14 RX ORDER — SODIUM CHLORIDE, SODIUM LACTATE, POTASSIUM CHLORIDE, CALCIUM CHLORIDE 600; 310; 30; 20 MG/100ML; MG/100ML; MG/100ML; MG/100ML
INJECTION, SOLUTION INTRAVENOUS CONTINUOUS
Status: CANCELLED | OUTPATIENT
Start: 2018-08-14

## 2018-08-14 RX ORDER — MELOXICAM 15 MG/1
15 TABLET ORAL DAILY
COMMUNITY
End: 2020-01-07

## 2018-08-14 RX ORDER — OXYCODONE HYDROCHLORIDE 5 MG/1
5 TABLET ORAL ONCE AS NEEDED
Status: CANCELLED | OUTPATIENT
Start: 2018-08-14 | End: 2018-08-14

## 2018-08-14 RX ORDER — LIDOCAINE HYDROCHLORIDE 10 MG/ML
0.5 INJECTION, SOLUTION EPIDURAL; INFILTRATION; INTRACAUDAL; PERINEURAL ONCE
Status: CANCELLED | OUTPATIENT
Start: 2018-08-14 | End: 2018-08-14

## 2018-08-14 RX ORDER — MIDAZOLAM HYDROCHLORIDE 5 MG/ML
5 INJECTION INTRAMUSCULAR; INTRAVENOUS ONCE AS NEEDED
Status: CANCELLED | OUTPATIENT
Start: 2018-08-14 | End: 2018-08-14

## 2018-08-14 RX ORDER — FAMOTIDINE 20 MG/1
20 TABLET, FILM COATED ORAL
Status: CANCELLED | OUTPATIENT
Start: 2018-08-14 | End: 2018-08-14

## 2018-08-14 NOTE — ANESTHESIA PREPROCEDURE EVALUATION
08/14/2018  Aaron Guerra is a 63 y.o., male.    Anesthesia Evaluation    I have reviewed the Patient Summary Reports.    I have reviewed the Nursing Notes.   I have reviewed the Medications.     Review of Systems  Anesthesia Hx:  No problems with previous Anesthesia    Social:  Non-Smoker, No Alcohol Use    Hematology/Oncology:  Hematology Normal   Oncology Normal     EENT/Dental:EENT/Dental Normal   Cardiovascular:   Exercise tolerance: good Hypertension Denies MI.          Pulmonary:   Sleep Apnea    Renal/:   BPH    Hepatic/GI:   GERD, well controlled    Musculoskeletal:   Arthritis     Endocrine:  Endocrine Normal    Dermatological:  Skin Normal        Physical Exam  General:  Obesity    Airway/Jaw/Neck:  Airway Findings: Mouth Opening: Normal Tongue: Normal  General Airway Assessment: Adult  Mallampati: III  Jaw/Neck Findings:  Neck ROM: Normal ROM      Dental:  Dental Findings: In tact   Chest/Lungs:  Chest/Lungs Findings: Clear to auscultation, Normal Respiratory Rate     Heart/Vascular:  Heart Findings: Rate: Normal  Rhythm: Regular Rhythm  Sounds: Normal        Mental Status:  Mental Status Findings:  Cooperative, Alert and Oriented         Anesthesia Plan  Type of Anesthesia, risks & benefits discussed:  Anesthesia Type:  general  Patient's Preference: GA  Intra-op Monitoring Plan: standard ASA monitors  Intra-op Monitoring Plan Comments:   Post Op Pain Control Plan: per primary service following discharge from PACU  Post Op Pain Control Plan Comments:   Induction:   IV  Beta Blocker:  Patient is not currently on a Beta-Blocker (No further documentation required).       Informed Consent: Patient understands risks and agrees with Anesthesia plan.  Questions answered. Anesthesia consent signed with patient.  ASA Score: 2     Day of Surgery Review of History & Physical:    H&P update referred to the  provider.     Anesthesia Plan Notes: No labs. Propofol for sedation.        Ready For Surgery From Anesthesia Perspective.

## 2018-08-14 NOTE — DISCHARGE INSTRUCTIONS
PRE-ADMIT TESTING -  [phone]    [address]  [place] [facility]          Your surgery has been scheduled at [facility]. We are pleased to have the opportunity to serve you. For Further Information please call [phone].    On the day of surgery please report to the Information Desk on the 1st floor.    · CONTACT YOUR PHYSICIAN'S OFFICE THE DAY PRIOR TO YOUR SURGERY TO OBTAIN YOUR ARRIVAL TIME.     · The evening before surgery do not eat anything after 9 p.m. ( this includes hard candy, chewing gum and mints).  You may only have GATORADE, POWERADE AND WATER  from 9 p.m. until you leave your home.   DO NOT DRINK ANY LIQUIDS ON THE WAY TO THE HOSPITAL.      SPECIAL MEDICATION INSTRUCTIONS: TAKE medications checked off by the Anesthesiologist on your Medication List.    Angiogram Patients: Take medications as instructed by your physician, including aspirin.     Surgery Patients:    If you take ASPIRIN - Your PHYSICIAN/SURGEON will need to inform you IF/OR when you need to stop taking aspirin prior to your surgery.     Do Not take any medications containing IBUPROFEN.  Do Not Wear any make-up or dark nail polish   (especially eye make-up) to surgery. If you come to surgery with makeup on you will be required to remove the makeup or nail polish.    Do not shave your surgical area at least 5 days prior to your surgery. The surgical prep will be performed at the hospital according to Infection Control regulations.    Leave all valuables at home.   Do Not wear any jewelry or watches, including any metal in body piercings.  Contact Lens must be removed before surgery. Either do not wear the contact lens or bring a case and solution for storage.  Please bring a container for eyeglasses or dentures as required.  Bring any paperwork your physician has provided, such as consent forms,  history and physicals, doctor's orders, etc.   Bring comfortable clothes that are loose fitting to wear upon  discharge. Take into consideration the type of surgery being performed.  Maintain your diet as advised per your physician the day prior to surgery.      Adequate rest the night before surgery is advised.   Park in the Parking lot behind the hospital or in the [facility] across the street from the parking lot. Parking is complimentary.  If you will be discharged the same day as your procedure, please arrange for a responsible adult to drive you home or to accompany you if traveling by taxi.   YOU WILL NOT BE PERMITTED TO DRIVE OR TO LEAVE THE HOSPITAL ALONE AFTER SURGERY.   It is strongly recommended that you arrange for someone to remain with you for the first 24 hrs following your surgery.       Thank you for your cooperation.  The Staff of [facility].        Bathing Instructions                                                                 Please shower the evening before and morning of your procedure with    ANTIBACTERIAL SOAP. ( DIAL, etc )  Concentrate on the surgical area   for at least 3 minutes and rinse completely. Dry off as usual.   Do not use any deodorant, powder, body lotions, perfume, after shave or    cologne.

## 2018-08-15 ENCOUNTER — ANESTHESIA (OUTPATIENT)
Dept: SURGERY | Facility: OTHER | Age: 63
End: 2018-08-15
Payer: COMMERCIAL

## 2018-08-15 ENCOUNTER — HOSPITAL ENCOUNTER (OUTPATIENT)
Facility: OTHER | Age: 63
Discharge: HOME | End: 2018-08-15
Attending: UROLOGY | Admitting: UROLOGY
Payer: COMMERCIAL

## 2018-08-15 VITALS
HEIGHT: 66 IN | HEART RATE: 75 BPM | RESPIRATION RATE: 20 BRPM | OXYGEN SATURATION: 98 % | SYSTOLIC BLOOD PRESSURE: 157 MMHG | DIASTOLIC BLOOD PRESSURE: 94 MMHG | TEMPERATURE: 98 F | BODY MASS INDEX: 30.53 KG/M2 | WEIGHT: 190 LBS

## 2018-08-15 DIAGNOSIS — N13.8 BPH WITH OBSTRUCTION/LOWER URINARY TRACT SYMPTOMS: ICD-10-CM

## 2018-08-15 DIAGNOSIS — N40.1 BPH WITH OBSTRUCTION/LOWER URINARY TRACT SYMPTOMS: ICD-10-CM

## 2018-08-15 DIAGNOSIS — N40.1 BENIGN NON-NODULAR PROSTATIC HYPERPLASIA WITH LOWER URINARY TRACT SYMPTOMS: ICD-10-CM

## 2018-08-15 PROCEDURE — 25000003 PHARM REV CODE 250: Performed by: SPECIALIST

## 2018-08-15 PROCEDURE — 71000015 HC POSTOP RECOV 1ST HR: Performed by: UROLOGY

## 2018-08-15 PROCEDURE — 36000706: Performed by: UROLOGY

## 2018-08-15 PROCEDURE — L8699 PROSTHETIC IMPLANT NOS: HCPCS | Performed by: UROLOGY

## 2018-08-15 PROCEDURE — 52442 CYSTO INS TRNSPRSTC IMPLT EA: CPT | Mod: ,,, | Performed by: UROLOGY

## 2018-08-15 PROCEDURE — 71000016 HC POSTOP RECOV ADDL HR: Performed by: UROLOGY

## 2018-08-15 PROCEDURE — 63600175 PHARM REV CODE 636 W HCPCS: Performed by: NURSE ANESTHETIST, CERTIFIED REGISTERED

## 2018-08-15 PROCEDURE — 37000009 HC ANESTHESIA EA ADD 15 MINS: Performed by: UROLOGY

## 2018-08-15 PROCEDURE — 25000003 PHARM REV CODE 250: Performed by: ANESTHESIOLOGY

## 2018-08-15 PROCEDURE — 63600175 PHARM REV CODE 636 W HCPCS: Performed by: SPECIALIST

## 2018-08-15 PROCEDURE — 37000008 HC ANESTHESIA 1ST 15 MINUTES: Performed by: UROLOGY

## 2018-08-15 PROCEDURE — 63600175 PHARM REV CODE 636 W HCPCS: Performed by: UROLOGY

## 2018-08-15 PROCEDURE — 25000003 PHARM REV CODE 250: Performed by: UROLOGY

## 2018-08-15 PROCEDURE — 52441 CYSTO INSJ TRNSPRSTC 1 IMPLT: CPT | Mod: ,,, | Performed by: UROLOGY

## 2018-08-15 PROCEDURE — 36000707: Performed by: UROLOGY

## 2018-08-15 DEVICE — SYS UROLIFT: Type: IMPLANTABLE DEVICE | Site: URETHRA | Status: FUNCTIONAL

## 2018-08-15 RX ORDER — AMLODIPINE BESYLATE 5 MG/1
5 TABLET ORAL ONCE
Status: COMPLETED | OUTPATIENT
Start: 2018-08-15 | End: 2018-08-15

## 2018-08-15 RX ORDER — PHENAZOPYRIDINE HYDROCHLORIDE 200 MG/1
200 TABLET, FILM COATED ORAL 3 TIMES DAILY PRN
Status: DISCONTINUED | OUTPATIENT
Start: 2018-08-15 | End: 2018-08-15 | Stop reason: HOSPADM

## 2018-08-15 RX ORDER — MEPERIDINE HYDROCHLORIDE 50 MG/ML
12.5 INJECTION INTRAMUSCULAR; INTRAVENOUS; SUBCUTANEOUS ONCE AS NEEDED
Status: DISCONTINUED | OUTPATIENT
Start: 2018-08-15 | End: 2018-08-15 | Stop reason: HOSPADM

## 2018-08-15 RX ORDER — CIPROFLOXACIN 2 MG/ML
400 INJECTION, SOLUTION INTRAVENOUS
Status: COMPLETED | OUTPATIENT
Start: 2018-08-15 | End: 2018-08-15

## 2018-08-15 RX ORDER — ONDANSETRON 2 MG/ML
4 INJECTION INTRAMUSCULAR; INTRAVENOUS DAILY PRN
Status: DISCONTINUED | OUTPATIENT
Start: 2018-08-15 | End: 2018-08-15 | Stop reason: HOSPADM

## 2018-08-15 RX ORDER — HYDROCODONE BITARTRATE AND ACETAMINOPHEN 5; 325 MG/1; MG/1
1 TABLET ORAL EVERY 4 HOURS PRN
Status: DISCONTINUED | OUTPATIENT
Start: 2018-08-15 | End: 2018-08-15 | Stop reason: HOSPADM

## 2018-08-15 RX ORDER — CIPROFLOXACIN 500 MG/1
500 TABLET ORAL 2 TIMES DAILY
Qty: 4 TABLET | Refills: 0 | Status: SHIPPED | OUTPATIENT
Start: 2018-08-15 | End: 2018-08-17

## 2018-08-15 RX ORDER — OXYCODONE HYDROCHLORIDE 5 MG/1
5 TABLET ORAL ONCE AS NEEDED
Status: DISCONTINUED | OUTPATIENT
Start: 2018-08-15 | End: 2018-08-15 | Stop reason: HOSPADM

## 2018-08-15 RX ORDER — MORPHINE SULFATE 10 MG/ML
3 INJECTION INTRAMUSCULAR; INTRAVENOUS; SUBCUTANEOUS
Status: DISCONTINUED | OUTPATIENT
Start: 2018-08-15 | End: 2018-08-15 | Stop reason: HOSPADM

## 2018-08-15 RX ORDER — ONDANSETRON 2 MG/ML
INJECTION INTRAMUSCULAR; INTRAVENOUS
Status: DISCONTINUED | OUTPATIENT
Start: 2018-08-15 | End: 2018-08-15

## 2018-08-15 RX ORDER — FAMOTIDINE 20 MG/1
20 TABLET, FILM COATED ORAL
Status: COMPLETED | OUTPATIENT
Start: 2018-08-15 | End: 2018-08-15

## 2018-08-15 RX ORDER — LIDOCAINE HYDROCHLORIDE 10 MG/ML
0.5 INJECTION, SOLUTION EPIDURAL; INFILTRATION; INTRACAUDAL; PERINEURAL ONCE
Status: DISCONTINUED | OUTPATIENT
Start: 2018-08-15 | End: 2018-08-15 | Stop reason: HOSPADM

## 2018-08-15 RX ORDER — ATROPA BELLADONNA AND OPIUM 16.2; 6 MG/1; MG/1
60 SUPPOSITORY RECTAL ONCE
Status: COMPLETED | OUTPATIENT
Start: 2018-08-15 | End: 2018-08-15

## 2018-08-15 RX ORDER — OXYCODONE AND ACETAMINOPHEN 5; 325 MG/1; MG/1
1 TABLET ORAL EVERY 4 HOURS PRN
Qty: 10 TABLET | Refills: 0 | Status: SHIPPED | OUTPATIENT
Start: 2018-08-15 | End: 2018-11-29

## 2018-08-15 RX ORDER — OXYCODONE HYDROCHLORIDE 5 MG/1
5 TABLET ORAL
Status: DISCONTINUED | OUTPATIENT
Start: 2018-08-15 | End: 2018-08-15 | Stop reason: HOSPADM

## 2018-08-15 RX ORDER — FENTANYL CITRATE 50 UG/ML
25 INJECTION, SOLUTION INTRAMUSCULAR; INTRAVENOUS EVERY 5 MIN PRN
Status: DISCONTINUED | OUTPATIENT
Start: 2018-08-15 | End: 2018-08-15 | Stop reason: HOSPADM

## 2018-08-15 RX ORDER — MIDAZOLAM HYDROCHLORIDE 5 MG/ML
5 INJECTION INTRAMUSCULAR; INTRAVENOUS ONCE AS NEEDED
Status: COMPLETED | OUTPATIENT
Start: 2018-08-15 | End: 2018-08-15

## 2018-08-15 RX ORDER — SODIUM CHLORIDE, SODIUM LACTATE, POTASSIUM CHLORIDE, CALCIUM CHLORIDE 600; 310; 30; 20 MG/100ML; MG/100ML; MG/100ML; MG/100ML
INJECTION, SOLUTION INTRAVENOUS CONTINUOUS
Status: DISCONTINUED | OUTPATIENT
Start: 2018-08-15 | End: 2018-08-15 | Stop reason: HOSPADM

## 2018-08-15 RX ORDER — ONDANSETRON 2 MG/ML
4 INJECTION INTRAMUSCULAR; INTRAVENOUS EVERY 12 HOURS PRN
Status: DISCONTINUED | OUTPATIENT
Start: 2018-08-15 | End: 2018-08-15 | Stop reason: HOSPADM

## 2018-08-15 RX ORDER — PROPOFOL 10 MG/ML
VIAL (ML) INTRAVENOUS CONTINUOUS PRN
Status: DISCONTINUED | OUTPATIENT
Start: 2018-08-15 | End: 2018-08-15

## 2018-08-15 RX ORDER — MIDAZOLAM HYDROCHLORIDE 1 MG/ML
INJECTION INTRAMUSCULAR; INTRAVENOUS
Status: DISCONTINUED | OUTPATIENT
Start: 2018-08-15 | End: 2018-08-15

## 2018-08-15 RX ORDER — SODIUM CHLORIDE 0.9 % (FLUSH) 0.9 %
3 SYRINGE (ML) INJECTION
Status: DISCONTINUED | OUTPATIENT
Start: 2018-08-15 | End: 2018-08-15 | Stop reason: HOSPADM

## 2018-08-15 RX ORDER — PROPOFOL 10 MG/ML
VIAL (ML) INTRAVENOUS
Status: DISCONTINUED | OUTPATIENT
Start: 2018-08-15 | End: 2018-08-15

## 2018-08-15 RX ORDER — PHENAZOPYRIDINE HYDROCHLORIDE 100 MG/1
100 TABLET, FILM COATED ORAL 3 TIMES DAILY PRN
Qty: 20 TABLET | Refills: 1 | Status: SHIPPED | OUTPATIENT
Start: 2018-08-15 | End: 2018-11-29

## 2018-08-15 RX ORDER — LIDOCAINE HCL/PF 100 MG/5ML
SYRINGE (ML) INTRAVENOUS
Status: DISCONTINUED | OUTPATIENT
Start: 2018-08-15 | End: 2018-08-15

## 2018-08-15 RX ADMIN — ATROPA BELLADONNA AND OPIUM 60 MG: 16.2; 6 SUPPOSITORY RECTAL at 01:08

## 2018-08-15 RX ADMIN — CIPROFLOXACIN 400 MG: 2 INJECTION, SOLUTION INTRAVENOUS at 11:08

## 2018-08-15 RX ADMIN — AMLODIPINE BESYLATE 5 MG: 5 TABLET ORAL at 12:08

## 2018-08-15 RX ADMIN — ONDANSETRON 4 MG: 2 INJECTION INTRAMUSCULAR; INTRAVENOUS at 11:08

## 2018-08-15 RX ADMIN — MIDAZOLAM HYDROCHLORIDE 2 MG: 1 INJECTION, SOLUTION INTRAMUSCULAR; INTRAVENOUS at 11:08

## 2018-08-15 RX ADMIN — PHENAZOPYRIDINE HYDROCHLORIDE 200 MG: 200 TABLET ORAL at 12:08

## 2018-08-15 RX ADMIN — LIDOCAINE HYDROCHLORIDE 100 MG: 20 INJECTION, SOLUTION INTRAVENOUS at 11:08

## 2018-08-15 RX ADMIN — PROPOFOL 100 MCG/KG/MIN: 10 INJECTION, EMULSION INTRAVENOUS at 11:08

## 2018-08-15 RX ADMIN — FAMOTIDINE 20 MG: 20 TABLET ORAL at 10:08

## 2018-08-15 RX ADMIN — MIDAZOLAM HYDROCHLORIDE 5 MG: 5 INJECTION, SOLUTION INTRAMUSCULAR; INTRAVENOUS at 10:08

## 2018-08-15 RX ADMIN — SODIUM CHLORIDE, SODIUM LACTATE, POTASSIUM CHLORIDE, AND CALCIUM CHLORIDE: 600; 310; 30; 20 INJECTION, SOLUTION INTRAVENOUS at 10:08

## 2018-08-15 RX ADMIN — PROPOFOL 50 MG: 10 INJECTION, EMULSION INTRAVENOUS at 11:08

## 2018-08-15 NOTE — DISCHARGE INSTRUCTIONS
Anesthesia: Monitored Anesthesia Care (MAC)    Anesthesia Safety  · Have an adult family member or friend drive you home after the procedure.  · For the first 24 hours after your surgery:  ¨ Do not drive or use heavy equipment.  ¨ Do not make important decisions or sign documents.  ¨ Avoid alcohol.  ¨ Have someone stay with you, if possible. They can watch for problems and help keep you safe.    GENERAL EXPECTATIONS    Some men may experience discomfort after the procedure.  You may have soreness in the lower abdomen , and it may   be uncomfortable to sit. You may experience the need to urinate  frequently and with greater urgency.     You may have some blood in your   urine, including passing an occasional blood clot. These are all normal  reactions to the procedure. Many of these symptoms will resolve in a week  or two, but some may last up to four weeks- this is normal.    The following are some suggestions:    1. Have someone drive you home after your procedure.    2. Drink plenty of water.    3. Take your medication as prescribed.    4. Avoid strenuous activity for one week.    5. If you have a catheter placed into your bladder , do not take a bath until it is     removed., though you can take a shower.    6. You may have sex in 3-5 days if you are not having any bleeding or pain.    Contact your physician if you experience any of the following    1. Temperature above 101.5 F ( taken by mouth)  2. Excessive urinary bleeding or bleeding from the penis  3. Continuous bladder spasms  4. Painful, swollen and/or inflated testicle(s) or scrotum  5. Unable to void spontaneously or the indwelling catheter is not draining urine or is      blocked.     IF YOU NEED IMMMEDIATE ATTENTION, GO TO THE HOSPITAL EMERGENCY ROOM FOR TREATMENT- be sure to tell the facility personnel to use a Coude tipped catheter.

## 2018-08-15 NOTE — BRIEF OP NOTE
[facility]  Brief Operative Note     SUMMARY     Surgery Date: 8/15/2018     Surgeon(s) and Role:     * Trenton Weathers MD - Primary    Assisting Surgeon: None    Pre-op Diagnosis:  Benign non-nodular prostatic hyperplasia with lower urinary tract symptoms [N40.1]    Post-op Diagnosis:  Post-Op Diagnosis Codes:     * Benign non-nodular prostatic hyperplasia with lower urinary tract symptoms [N40.1]    Procedure(s) (LRB):  TRANSPROSTATIC TISSUE RETRACTION (N/A)    Anesthesia: Choice    Description of the findings of the procedure: Urolift with 5 implants. No cyr placed.    Estimated Blood Loss: 0cc         Specimens:   Specimen (12h ago, onward)    None          Discharge Note    SUMMARY     Admit Date: 8/15/2018    Discharge Date and Time: 8/15/2018    Hospital Course: Patient was admitted for elective outpatient procedure, which was uncomplicated and well tolerated.      Final Diagnosis: Post-Op Diagnosis Codes:     * Benign non-nodular prostatic hyperplasia with lower urinary tract symptoms [N40.1]    Disposition: Home or Self Care    Follow Up/Patient Instructions:     Medications:  Reconciled Home Medications:   Current Discharge Medication List      START taking these medications    Details   ciprofloxacin HCl (CIPRO) 500 MG tablet Take 1 tablet (500 mg total) by mouth 2 (two) times daily. for 2 days  Qty: 4 tablet, Refills: 0      oxyCODONE-acetaminophen (PERCOCET) 5-325 mg per tablet Take 1 tablet by mouth every 4 (four) hours as needed for Pain.  Qty: 10 tablet, Refills: 0      phenazopyridine (PYRIDIUM) 100 MG tablet Take 1 tablet (100 mg total) by mouth 3 (three) times daily as needed for Pain.  Qty: 20 tablet, Refills: 1         CONTINUE these medications which have NOT CHANGED    Details   amLODIPine (NORVASC) 5 MG tablet Take 1 tablet (5 mg total) by mouth once daily.  Qty: 90 tablet, Refills: 1    Associated Diagnoses: Encounter for medication refill      ascorbate calcium (VITAMIN C  ORAL) Take 1 tablet by mouth once daily.      aspirin 81 MG Chew Take 81 mg by mouth once daily.      atorvastatin (LIPITOR) 40 MG tablet Take 1 tablet (40 mg total) by mouth once daily.  Qty: 30 tablet, Refills: 2    Associated Diagnoses: Hypercholesteremia      fluticasone (FLONASE) 50 mcg/actuation nasal spray 2 sprays by Each Nare route once daily.      lisinopril (PRINIVIL,ZESTRIL) 5 MG tablet Take 1 tablet (5 mg total) by mouth once daily.  Qty: 30 tablet, Refills: 0      loratadine (CLARITIN) 10 mg tablet Take 1 tablet (10 mg total) by mouth once daily.  Qty: 30 tablet, Refills: 0    Associated Diagnoses: Encounter for medication refill      meloxicam (MOBIC) 15 MG tablet Take 15 mg by mouth once daily.      tamsulosin (FLOMAX) 0.4 mg Cap Take 1 capsule (0.4 mg total) by mouth once daily.  Qty: 30 capsule, Refills: 11    Associated Diagnoses: Encounter for medication refill      vitamin E acetate (VITAMIN E ORAL) Take 1 tablet by mouth once daily.           Discharge Procedure Orders   Diet general     Follow-up Information     Trenton Weathers MD. Go in 3 weeks.    Specialty:  Urology  Why:  For post-operative follow-up  Contact information:  [address] [id number]  [facility] [address] [phone]

## 2018-08-15 NOTE — OR NURSING
Dr Weathers called, aware that patient's bladder spasms have improved, and that he has voided several times. Order noted to do bladder scan, if < 250mls, patient may be discharged home.

## 2018-08-15 NOTE — ANESTHESIA POSTPROCEDURE EVALUATION
"Anesthesia Post Evaluation    Patient: Aaron Guerra    Procedure(s) Performed: Procedure(s) (LRB):  TRANSPROSTATIC TISSUE RETRACTION (N/A)    Final Anesthesia Type: general  Patient location during evaluation: [facility]  Patient participation: Yes- Able to Participate  Level of consciousness: awake and alert, awake and oriented  Post-procedure vital signs: reviewed and stable  Pain management: adequate  Airway patency: patent  PONV status at discharge: No PONV  Anesthetic complications: no      Cardiovascular status: blood pressure returned to baseline, hemodynamically stable and stable  Respiratory status: unassisted, spontaneous ventilation and room air  Hydration status: euvolemic  Follow-up not needed.        Visit Vitals  BP (!) 159/81 (BP Location: Left arm, Patient Position: Lying)   Pulse 66   Temp 36.6 °C (97.9 °F) (Oral)   Resp 18   Ht 5' 6" (1.676 m)   Wt 86.2 kg (190 lb)   SpO2 97%   BMI 30.67 kg/m²       Pain/Isai Score: Pain Assessment Performed: Yes (8/15/2018 10:05 AM)  Presence of Pain: denies (8/15/2018 10:05 AM)        "

## 2018-08-15 NOTE — OP NOTE
Operative Note       Surgery Date: 8/15/2018     Surgeon: Jhony Weathers MD    Pre-op Diagnosis:  Benign non-nodular prostatic hyperplasia with lower urinary tract symptoms [N40.1]    Post-op Diagnosis: Post-Op Diagnosis Codes:     * Benign non-nodular prostatic hyperplasia with lower urinary tract symptoms [N40.1]    Procedures:  Prostatic Urethral Lift (Urolift)    Anesthesia: General    Indications for Procedure:  The patient is a 63 y.o. year old male with BPH with VILLAFUERTE and LUTS.  He presents today for surgical treatment with prostatic urethral lift (Urolift).  The full risks and benefits of the procedure have been explained and detail and he wishes to proceed.    Procedure Description:  The patient was brought to the operative suite and placed under Choice anesthesia. He was placed in lithotomy position and prepped and draped in usual sterile fashion.  Time out was performed prior to starting the procedure.    A 20F cystoscope was inserted into the bladder. The cystoscopy bridge was replaced with a UroLift delivery device. The first treatment site was the patient's left side approximately 1.5cm distal to the bladder neck. The distal tip of the delivery device was then angled laterally approximately 20 degrees at this position to compress the lateral lobe. The trigger was pulled, thereby deploying a needle containing the implant through the prostate. The needle was then retracted, allowing one end of the implant to be delivered to the capsular surface of the prostate. The implant was then tensioned to assure capsular seating and removal of slack monofilament. The device was then angled back toward midline and slowly advanced proximally (typically 3 to 4 mm) until cystoscopic verification of the monofilament being centered in the delivery bay. The urethral end piece was then affixed to the monofilament thereby tailoring the size of the implant. Excess filament was then severed. The delivery device was then  re-advanced into the bladder. The delivery device was then replaced and the same procedure was then repeated on the right side.    The delivery device was then replaced with cystoscope and bridge and the implant location and opening effect was confirmed cystoscopically. Two additional implants were delivered just proximal to the veru montanum, again one on right and one on left side of the prostate, following the same technique.     Cystoscopy then revealed a persistent area of obstruction at the bladder neck due to small median lobe. An additional implant was placed in this location on the left side.    A final cystoscopy was conducted first to inspect the location and state of each implant and second, to confirm the presence of a continuous anterior channel was present through the prostatic urethra with irrigation flow turned off. The bladder was then filled with approximately 150 cc irrigation fluid to assist the patient in void trial after the procedure, and all instruments were removed. The patient did not receive a post-operative urinary catheter.     Complications: No    Estimated Blood Loss: 0cc         Specimens Removed:   Specimen (12h ago, onward)    None          Implants:   Implant Name Type Inv. Item Serial No.  Lot No. LRB No. Used   SYSTEM UROLIFT - [id number]  SYSTEM UROLIFT  [employer] [id number] N/A 5              Disposition: PACU - hemodynamically stable.           Condition: Good

## 2018-08-15 NOTE — PLAN OF CARE
Aaron Guerra has met all discharge criteria from Phase II. Vital Signs are stable, ambulating  without difficulty. Discharge instructions given, patient verbalized understanding. Discharged from facility via wheelchair in stable condition.

## 2018-08-15 NOTE — INTERVAL H&P NOTE
The patient has been examined and the H&P has been reviewed:    I concur with the findings and no changes have occurred since H&P was written.    Anesthesia/Surgery risks, benefits and alternative options discussed and understood by patient/family.          Active Hospital Problems    Diagnosis  POA    BPH with obstruction/lower urinary tract symptoms [N40.1, N13.8]  Yes      Resolved Hospital Problems   No resolved problems to display.

## 2018-08-20 ENCOUNTER — TELEPHONE (OUTPATIENT)
Dept: UROLOGY | Facility: CLINIC | Age: 63
End: 2018-08-20

## 2018-08-20 NOTE — TELEPHONE ENCOUNTER
----- Message from Carlota Aguilar sent at 8/20/2018  8:42 AM CDT -----            Name of Who is Calling: KEITH DIAMOND [[id number]]      What is the request in detail: Pt would like to speak with Yola or Dr. Weathers. He states he had surgery on last Wednesday and he is experiencing complications. Please call to discuss.      Can the clinic reply by [facility]: no      What Number [facility] Back [facility]: [phone]

## 2018-08-20 NOTE — TELEPHONE ENCOUNTER
The pain/pressure at tip of penis is probably from bladder spasms, which are normal and might be worse b/c of the constipation. If what he is taking isn't working, he should drink some magnesium citrate.

## 2018-08-20 NOTE — TELEPHONE ENCOUNTER
Spoke with pt and his wife. Pt states that he just wanted to make sure that the symptoms were normal.

## 2018-08-20 NOTE — TELEPHONE ENCOUNTER
Spoke with pt. He states that he is having some concerns post op. Per pt he amando pressure at the tip of his penis and also constipation.  says he is currently taking miralax 2 times a day and colace 3 times a day. Please advise.

## 2018-08-31 DIAGNOSIS — E78.00 HYPERCHOLESTEREMIA: ICD-10-CM

## 2018-08-31 RX ORDER — ATORVASTATIN CALCIUM 40 MG/1
TABLET, FILM COATED ORAL
Qty: 30 TABLET | Refills: 0 | Status: SHIPPED | OUTPATIENT
Start: 2018-08-31 | End: 2018-11-05 | Stop reason: SDUPTHER

## 2018-09-05 ENCOUNTER — OFFICE VISIT (OUTPATIENT)
Dept: UROLOGY | Facility: CLINIC | Age: 63
End: 2018-09-05
Attending: UROLOGY
Payer: COMMERCIAL

## 2018-09-05 VITALS
HEART RATE: 79 BPM | WEIGHT: 193.13 LBS | HEIGHT: 66 IN | BODY MASS INDEX: 31.04 KG/M2 | DIASTOLIC BLOOD PRESSURE: 93 MMHG | SYSTOLIC BLOOD PRESSURE: 149 MMHG

## 2018-09-05 DIAGNOSIS — N40.1 BPH WITH OBSTRUCTION/LOWER URINARY TRACT SYMPTOMS: Primary | ICD-10-CM

## 2018-09-05 DIAGNOSIS — N13.8 BPH WITH OBSTRUCTION/LOWER URINARY TRACT SYMPTOMS: Primary | ICD-10-CM

## 2018-09-05 DIAGNOSIS — R97.20 ELEVATED PSA: ICD-10-CM

## 2018-09-05 PROCEDURE — 51798 US URINE CAPACITY MEASURE: CPT | Mod: S$GLB,,, | Performed by: UROLOGY

## 2018-09-05 PROCEDURE — 3080F DIAST BP >= 90 MM HG: CPT | Mod: CPTII,S$GLB,, | Performed by: UROLOGY

## 2018-09-05 PROCEDURE — 81002 URINALYSIS NONAUTO W/O SCOPE: CPT | Mod: S$GLB,,, | Performed by: UROLOGY

## 2018-09-05 PROCEDURE — 3077F SYST BP >= 140 MM HG: CPT | Mod: CPTII,S$GLB,, | Performed by: UROLOGY

## 2018-09-05 PROCEDURE — 3008F BODY MASS INDEX DOCD: CPT | Mod: CPTII,S$GLB,, | Performed by: UROLOGY

## 2018-09-05 PROCEDURE — 99214 OFFICE O/P EST MOD 30 MIN: CPT | Mod: 25,S$GLB,, | Performed by: UROLOGY

## 2018-09-05 NOTE — PROGRESS NOTES
"Subjective:      Aaron Guerra is a 63 y.o. male who returns today regarding his BPH.    He is s/p Urolift 8/15/2018. No problems post-op.    His preop AUASS was 10/2.    His AUASS today is 16/4.    He has not stopped all BPH medications.    He has no c/o today.    Also had elevated PSA (6.4) at initial visit, though this improved to 5.1 with 32% free on FU.    The following portions of the patient's history were reviewed and updated as appropriate: allergies, current medications, past family history, past medical history, past social history, past surgical history and problem list.    Review of Systems  A comprehensive multipoint review of systems was negative except as otherwise stated in the HPI.     Objective:   Vitals: /85 (BP Location: Right arm, Patient Position: Sitting, BP Method: Large (Automatic))   Pulse 61   Ht 5' 11" (1.803 m)   Wt 109.6 kg (241 lb 10 oz)   BMI 33.70 kg/m²     Physical Exam   General: alert and oriented, no acute distress  Respiratory: Symmetric expansion, non-labored breathing  Neuro: no gross deficits  Psych: normal judgment and insight, normal mood/affect and non-anxious    Bladder Scan PVR: 14cc      Lab Review   Urinalysis demonstrates negative for all components  Lab Results   Component Value Date    WBC 4.05 05/21/2018    HGB 11.8 (L) 05/21/2018    HCT 39.2 (L) 05/21/2018    MCV 73 (L) 05/21/2018     05/21/2018     Lab Results   Component Value Date    CREATININE 1.3 05/21/2018    BUN 17 05/21/2018     Lab Results   Component Value Date    PSA 2.25 02/11/2012    PSADIAG 5.7 (H) 05/21/2018         Assessment and Plan:   1. Benign non-nodular prostatic hyperplasia with lower urinary tract symptoms  -- S/p Urolift  -- Continue flomax for now - can stop if symptoms improve  -- PSA in 6 mos  -- FU 3 mos   "

## 2018-09-06 ENCOUNTER — TELEPHONE (OUTPATIENT)
Dept: SLEEP MEDICINE | Facility: CLINIC | Age: 63
End: 2018-09-06

## 2018-09-06 LAB
BILIRUB SERPL-MCNC: NORMAL MG/DL
BLOOD URINE, POC: NORMAL
COLOR, POC UA: YELLOW
GLUCOSE UR QL STRIP: NORMAL
KETONES UR QL STRIP: NORMAL
LEUKOCYTE ESTERASE URINE, POC: NORMAL
NITRITE, POC UA: NORMAL
PH, POC UA: 5
POC RESIDUAL URINE VOLUME: 14 ML (ref 0–100)
PROTEIN, POC: NORMAL
SPECIFIC GRAVITY, POC UA: 1.01
UROBILINOGEN, POC UA: NORMAL

## 2018-09-06 NOTE — TELEPHONE ENCOUNTER
----- Message from Jacque Jarrett sent at 9/6/2018 11:07 AM CDT -----  Name of Who is Calling: KEITH DIAMOND [[id number]]      What is the request in detail: Patient would like a callback from staff in regards to his sleep study results. Please contact to further discuss and advise      Can the clinic reply by [facility]: No       What Number to [facility] Back [facility]: [phone]

## 2018-09-10 ENCOUNTER — OFFICE VISIT (OUTPATIENT)
Dept: PULMONOLOGY | Facility: CLINIC | Age: 63
End: 2018-09-10
Payer: COMMERCIAL

## 2018-09-10 VITALS
WEIGHT: 192.44 LBS | OXYGEN SATURATION: 98 % | HEIGHT: 66 IN | DIASTOLIC BLOOD PRESSURE: 84 MMHG | BODY MASS INDEX: 30.93 KG/M2 | HEART RATE: 77 BPM | SYSTOLIC BLOOD PRESSURE: 132 MMHG

## 2018-09-10 DIAGNOSIS — J30.9 ALLERGIC RHINITIS, UNSPECIFIED SEASONALITY, UNSPECIFIED TRIGGER: ICD-10-CM

## 2018-09-10 DIAGNOSIS — R91.1 PULMONARY NODULE: Primary | ICD-10-CM

## 2018-09-10 DIAGNOSIS — R91.1 LUNG NODULE: ICD-10-CM

## 2018-09-10 PROCEDURE — 99244 OFF/OP CNSLTJ NEW/EST MOD 40: CPT | Mod: S$GLB,,, | Performed by: INTERNAL MEDICINE

## 2018-09-10 PROCEDURE — 99999 PR PBB SHADOW E&M-EST. PATIENT-LVL III: CPT | Mod: PBBFAC,,, | Performed by: INTERNAL MEDICINE

## 2018-09-10 NOTE — LETTER
September 10, 2018      Lincoln Villalta Jr., MD  [address]  [address]  [facility] [address]           [facility] - Pulmonary Services  [address]  [facility] [address]  Phone: [phone]          Patient: Aaron Guerra   MR Number: [id number]   YOB: 1955   Date of Visit: 9/10/2018       Dear Dr. Lincoln Villalta Jr.:    Thank you for referring Aaron Guerra to me for evaluation. Attached you will find relevant portions of my assessment and plan of care.    If you have questions, please do not hesitate to call me. I look forward to following Aaron Guerra along with you.    Sincerely,    Shanelle Alanis MD    Enclosure  CC:  No Recipients    If you would like to receive this communication electronically, please contact [email or web address] or [phone] to request more information on [employer] access.    For providers and/or their staff who would like to refer a patient to [facility], please contact us through our one-stop-shop provider referral line, [facility], at [phone].    If you feel you have received this communication in error or would no longer like to receive these types of communications, please e-mail [email or web address]

## 2018-09-10 NOTE — ASSESSMENT & PLAN NOTE
Continue flonase.  Encourage to use flonase daily  Encourage simply saline mist daily to nasal passages.

## 2018-09-10 NOTE — PROGRESS NOTES
"Subjective:       Patient ID: Aaron Guerra is a 63 y.o. male.    Consult from Dr. Villalta for    Chief Complaint: Pulmonary Nodules    63 year old who had a cardiac CT 5/30/2018 found a pulmonary nodule that reported:  "There is a 1.6 cm bilobed solid pulmonary nodule in the left lower lobe (series 102, image 26) with associated tree-in-bud pulmonary micro nodules extending more peripherally.  While findings are nonspecific and may be related to mucoid impaction, underlying tumor cannot be excluded.  Pulmonary medicine consultation is recommended."  Patient has chronic mucous congestion and sinus congestion.  Lifetime nonsmoker but grew up in a household with smoke.  Works in sales.         Review of Systems   Constitutional: Negative for fever, weight loss, weight gain and night sweats.   HENT: Negative for trouble swallowing.    Eyes: Negative for itching.   Respiratory: Negative for hemoptysis, sputum production and wheezing.    Cardiovascular: Negative for chest pain and leg swelling.   Genitourinary: Negative for difficulty urinating.   Endocrine: Negative for cold intolerance and heat intolerance.    Musculoskeletal: Negative for arthralgias.   Skin: Negative for rash.   Gastrointestinal: Positive for acid reflux (frequent).   Neurological: Negative for headaches.   Hematological: Negative for adenopathy.   Psychiatric/Behavioral: Negative for confusion.       Past medical and surgical history reviewed.  Social and family history reviewed.  Allergies and medications reviewed.  Objective:       Vitals:    09/10/18 1024   BP: 132/84   BP Location: Left arm   Patient Position: Sitting   Pulse: 77   SpO2: 98%   Weight: 87.3 kg (192 lb 7.4 oz)   Height: 5' 6" (1.676 m)     Physical Exam   Constitutional: He is oriented to person, place, and time. He appears well-developed and well-nourished. No distress.   HENT:   Head: Normocephalic.   Right Ear: External ear normal.   Left Ear: External ear normal.   Nose: Nose " normal.   Mouth/Throat: Oropharynx is clear and moist.   Neck: Normal range of motion. Neck supple. No tracheal deviation present. No thyromegaly present.   Cardiovascular: Normal rate, regular rhythm, normal heart sounds and intact distal pulses.   Pulmonary/Chest: Normal expansion and symmetric chest wall expansion. He has no wheezes. He has no rales. He exhibits no tenderness.   Abdominal: Soft. Bowel sounds are normal. He exhibits no distension and no mass. There is no hepatosplenomegaly. There is no tenderness.   Musculoskeletal: Normal range of motion.   Lymphadenopathy: No supraclavicular adenopathy is present.     He has no cervical adenopathy.   Neurological: He is alert and oriented to person, place, and time. No cranial nerve deficit.   Psychiatric: He has a normal mood and affect.     Personal Diagnostic Review  CT of chest performed on 5/30/2018 with contrast revealed LLL pulmonary nodule with surrounding tree in bud opacity.  No flowsheet data found.      Assessment:       1. Pulmonary nodule    2. Lung nodule    3. Allergic rhinitis, unspecified seasonality, unspecified trigger        Outpatient Encounter Medications as of 9/10/2018   Medication Sig Dispense Refill    amLODIPine (NORVASC) 5 MG tablet Take 1 tablet (5 mg total) by mouth once daily. 90 tablet 1    ascorbate calcium (VITAMIN C ORAL) Take 1 tablet by mouth once daily.      aspirin 81 MG Chew Take 81 mg by mouth once daily.      atorvastatin (LIPITOR) 40 MG tablet TAKE 1 TABLET BY MOUTH ONCE DAILY 30 tablet 0    fluticasone (FLONASE) 50 mcg/actuation nasal spray 2 sprays by Each Nare route once daily.      loratadine (CLARITIN) 10 mg tablet Take 1 tablet (10 mg total) by mouth once daily. 30 tablet 0    meloxicam (MOBIC) 15 MG tablet Take 15 mg by mouth once daily.      oxyCODONE-acetaminophen (PERCOCET) 5-325 mg per tablet Take 1 tablet by mouth every 4 (four) hours as needed for Pain. 10 tablet 0    phenazopyridine (PYRIDIUM)  100 MG tablet Take 1 tablet (100 mg total) by mouth 3 (three) times daily as needed for Pain. 20 tablet 1    tamsulosin (FLOMAX) 0.4 mg Cap Take 1 capsule (0.4 mg total) by mouth once daily. 30 capsule 11    vitamin E acetate (VITAMIN E ORAL) Take 1 tablet by mouth once daily.      lisinopril (PRINIVIL,ZESTRIL) 5 MG tablet Take 1 tablet (5 mg total) by mouth once daily. 30 tablet 0     No facility-administered encounter medications on file as of 9/10/2018.      Orders Placed This Encounter   Procedures    CT Chest Without Contrast     Standing Status:   Future     Standing Expiration Date:   9/10/2019     Order Specific Question:   May the Radiologist modify the order per protocol to meet the clinical needs of the patient?     Answer:   Yes     Plan:     Problem List Items Addressed This Visit     Allergic rhinitis    Current Assessment & Plan     Continue flonase.  Encourage to use flonase daily  Encourage simply saline mist daily to nasal passages.         Pulmonary nodule - Primary    Overview     Incidentally detected on a cardiac CT 5/30/2018  1.5 cm         Current Assessment & Plan     Low risk for malignancy.  However, size warrants follow up  CT of chest without contrast to determine if resolved, stable or enlarged.  If enlarged, will need biopsy.  If stable will continue to monitor  If resolved no further w/u necessary.           Other Visit Diagnoses     Lung nodule        Relevant Orders    CT Chest Without Contrast

## 2018-09-10 NOTE — ASSESSMENT & PLAN NOTE
Low risk for malignancy.  However, size warrants follow up  CT of chest without contrast to determine if resolved, stable or enlarged.  If enlarged, will need biopsy.  If stable will continue to monitor  If resolved no further w/u necessary.

## 2018-09-12 ENCOUNTER — PATIENT MESSAGE (OUTPATIENT)
Dept: FAMILY MEDICINE | Facility: CLINIC | Age: 63
End: 2018-09-12

## 2018-09-12 ENCOUNTER — LAB VISIT (OUTPATIENT)
Dept: LAB | Facility: HOSPITAL | Age: 63
End: 2018-09-12
Attending: FAMILY MEDICINE
Payer: COMMERCIAL

## 2018-09-12 DIAGNOSIS — Z91.89 FRAMINGHAM CARDIAC RISK 10-20% IN NEXT 10 YEARS: ICD-10-CM

## 2018-09-12 DIAGNOSIS — E78.00 HYPERCHOLESTEREMIA: ICD-10-CM

## 2018-09-12 DIAGNOSIS — I10 ESSENTIAL HYPERTENSION: ICD-10-CM

## 2018-09-12 LAB
ALBUMIN SERPL BCP-MCNC: 4 G/DL
ALP SERPL-CCNC: 99 U/L
ALT SERPL W/O P-5'-P-CCNC: 18 U/L
ANION GAP SERPL CALC-SCNC: 7 MMOL/L
AST SERPL-CCNC: 21 U/L
BILIRUB SERPL-MCNC: 0.6 MG/DL
BUN SERPL-MCNC: 16 MG/DL
CALCIUM SERPL-MCNC: 9.1 MG/DL
CHLORIDE SERPL-SCNC: 106 MMOL/L
CHOLEST SERPL-MCNC: 155 MG/DL
CHOLEST/HDLC SERPL: 4 {RATIO}
CO2 SERPL-SCNC: 26 MMOL/L
CREAT SERPL-MCNC: 1.2 MG/DL
EST. GFR  (AFRICAN AMERICAN): >60 ML/MIN/1.73 M^2
EST. GFR  (NON AFRICAN AMERICAN): >60 ML/MIN/1.73 M^2
GLUCOSE SERPL-MCNC: 97 MG/DL
HDLC SERPL-MCNC: 39 MG/DL
HDLC SERPL: 25.2 %
LDLC SERPL CALC-MCNC: 102.8 MG/DL
NONHDLC SERPL-MCNC: 116 MG/DL
POTASSIUM SERPL-SCNC: 4 MMOL/L
PROT SERPL-MCNC: 7.7 G/DL
SODIUM SERPL-SCNC: 139 MMOL/L
TRIGL SERPL-MCNC: 66 MG/DL

## 2018-09-12 PROCEDURE — 80061 LIPID PANEL: CPT

## 2018-09-12 PROCEDURE — 36415 COLL VENOUS BLD VENIPUNCTURE: CPT | Mod: PO

## 2018-09-12 PROCEDURE — 80053 COMPREHEN METABOLIC PANEL: CPT

## 2018-09-17 ENCOUNTER — TELEPHONE (OUTPATIENT)
Dept: SLEEP MEDICINE | Facility: CLINIC | Age: 63
End: 2018-09-17

## 2018-09-18 ENCOUNTER — PATIENT MESSAGE (OUTPATIENT)
Dept: PULMONOLOGY | Facility: CLINIC | Age: 63
End: 2018-09-18

## 2018-09-18 ENCOUNTER — OFFICE VISIT (OUTPATIENT)
Dept: SLEEP MEDICINE | Facility: CLINIC | Age: 63
End: 2018-09-18
Payer: COMMERCIAL

## 2018-09-18 ENCOUNTER — HOSPITAL ENCOUNTER (OUTPATIENT)
Dept: RADIOLOGY | Facility: OTHER | Age: 63
Discharge: HOME | End: 2018-09-18
Attending: INTERNAL MEDICINE
Payer: COMMERCIAL

## 2018-09-18 VITALS
BODY MASS INDEX: 31.5 KG/M2 | SYSTOLIC BLOOD PRESSURE: 140 MMHG | DIASTOLIC BLOOD PRESSURE: 90 MMHG | HEIGHT: 66 IN | HEART RATE: 87 BPM | WEIGHT: 196 LBS

## 2018-09-18 DIAGNOSIS — G47.30 SLEEP APNEA, UNSPECIFIED TYPE: Primary | ICD-10-CM

## 2018-09-18 DIAGNOSIS — R91.1 LUNG NODULE: ICD-10-CM

## 2018-09-18 DIAGNOSIS — I10 HYPERTENSION, UNSPECIFIED TYPE: ICD-10-CM

## 2018-09-18 DIAGNOSIS — J30.9 ALLERGIC RHINITIS, UNSPECIFIED SEASONALITY, UNSPECIFIED TRIGGER: ICD-10-CM

## 2018-09-18 PROCEDURE — 99999 PR PBB SHADOW E&M-EST. PATIENT-LVL IV: CPT | Mod: PBBFAC,,, | Performed by: INTERNAL MEDICINE

## 2018-09-18 PROCEDURE — 99214 OFFICE O/P EST MOD 30 MIN: CPT | Mod: S$GLB,,, | Performed by: INTERNAL MEDICINE

## 2018-09-18 PROCEDURE — 71250 CT THORAX DX C-: CPT | Mod: TC

## 2018-09-18 PROCEDURE — 3077F SYST BP >= 140 MM HG: CPT | Mod: CPTII,S$GLB,, | Performed by: INTERNAL MEDICINE

## 2018-09-18 PROCEDURE — 3008F BODY MASS INDEX DOCD: CPT | Mod: CPTII,S$GLB,, | Performed by: INTERNAL MEDICINE

## 2018-09-18 PROCEDURE — 3080F DIAST BP >= 90 MM HG: CPT | Mod: CPTII,S$GLB,, | Performed by: INTERNAL MEDICINE

## 2018-09-18 PROCEDURE — 71250 CT THORAX DX C-: CPT | Mod: 26,,, | Performed by: RADIOLOGY

## 2018-09-18 NOTE — PROGRESS NOTES
Pt is followingup in regards to the sleep study and has been snoring and having apnea episode and some time morning headache, nasal congestion. He does do mouth breathing, he sleep on his side. He sleeps 4-5 hrs. He wakes up 4-5 times and takes 20 min's to go back to sleep. He does feel the urge to move his legs at night time for years and it starts around 1 AM and denies any creepy crawling sensation. Denies leg kicking but does move a lot. He denies night mare and sleep walking/talking.          2018 Home sleep study showing:  AHI 39, , Over all RDI 48, Mean Spo2: 94.8. . < 90 Spo2 2.4%. Time snorin%    EPWORTH SLEEPINESS SCALE 2018   Sitting and reading 0   Watching TV 1   Sitting, inactive in a public place (e.g. a theatre or a meeting) 1   As a passenger in a car for an hour without a break 1   Lying down to rest in the afternoon when circumstances permit 3   Sitting and talking to someone 1   Sitting quietly after a lunch without alcohol 2   In a car, while stopped for a few minutes in traffic 1   Total score 10           2018    Aaron Guerra  was seen as a new patient at the request of  No ref. provider found for the evaluation of obstructive sleep apnea.    CHIEF COMPLAINT:    Chief Complaint   Patient presents with    Sleep Apnea       2018 MOY Velarde NP: Initial HISTORY OF PRESENT ILLNESS: Aaron Guerra is a 63 y.o. male is here for sleep evaluation.       Patient complaints include: snoring, interrupted sleep, witnessed apneic spells, and excessive daytime sleepiness.  Denies difficulty initiating sleep, but has difficulty with sleep maintenance. Nocturia up to 4x.     Denies symptoms of restless legs or kicking during sleep.    Occupation:retired     EPWORTH SLEEPINESS SCALE 2018   Sitting and reading 1   Watching TV 1   Sitting, inactive in a public place (e.g. a theatre or a meeting) 2   As a passenger in a car for an hour without a break 0   Lying down to rest in the  afternoon when circumstances permit 1   Sitting and talking to someone 1   Sitting quietly after a lunch without alcohol 3   In a car, while stopped for a few minutes in traffic 0   Total score 9       SLEEP ROUTINE:  Sleep Clinic New Patient 6/21/2018   What time do you go to bed on a week day? (Give a range) 10:30pm - 11pm   What time do you go to bed on a day off? (Give a range) do not work. 10:30pm,    How long does it take you to fall asleep? (Give a range) 20 min    On average, how many times per night do you wake up? 4   How long does it take you to fall back into sleep? (Give a range) 20-30 min    What time do you wake up to start your day on a week day? (Give a range) 6:30am -7 :00am    What time do you wake up to start your day on a day off? (Give a range) 6:30am- 7:00am    What time do you get out of bed? (Give a range) 6:30am-7am    On average, how many hours do you sleep? 3-5   On average, how many naps do you take per day? 2   Rate your sleep quality from 0 to 5 (0-poor, 5-great). 2   Have you experienced:  Weight gain?   How much weight have you lost or gained (in lbs.) in the last year? 12   On average, how many times per night do you go to the bathroom?  3-4   Have you ever had a sleep study/CPAP machine/surgery for sleep apnea? No   Have you ever had a CPAP machine for sleep apnea? No   Have you ever had surgery for sleep apnea? No         PAST MEDICAL HISTORY:    Active Ambulatory Problems     Diagnosis Date Noted    Hypertension     Colon polyp 05/21/2018    Allergic rhinitis 05/21/2018    Benign prostatic hyperplasia with lower urinary tract symptoms 05/21/2018    Hypercholesteremia 05/21/2018    ANGELIC (obstructive sleep apnea) 05/21/2018    [place] cardiac risk 10-20% in next 10 years 05/24/2018    Primary osteoarthritis of left shoulder 05/26/2018    DJD (degenerative joint disease), cervical 05/26/2018    Elevated PSA 05/28/2018    Beta thalassemia minor 05/28/2018    Pulmonary  nodule 07/11/2018    BPH with obstruction/lower urinary tract symptoms 08/15/2018     Resolved Ambulatory Problems     Diagnosis Date Noted    No Resolved Ambulatory Problems     Past Medical History:   Diagnosis Date    Arthritis     BPH (benign prostatic hypertrophy)     Cataract     Hypertension 2012                PAST SURGICAL HISTORY:    Past Surgical History:   Procedure Laterality Date    COLONOSCOPY N/A 12/1/2015    Procedure: COLONOSCOPY;  Surgeon: Meagan Hill MD;  Location: [facility] (4TH FLR);  Service: Endoscopy;  Laterality: N/A;    COLONOSCOPY N/A 12/1/2015    Performed by Meagan Hill MD at [facility] (4TH FLR)    CYSTOSCOPY WITH INSERTION OF MINIMALLY INVASIVE IMPLANT TO ENLARGE PROSTATIC URETHRA N/A 8/15/2018    Procedure: TRANSPROSTATIC TISSUE RETRACTION;  Surgeon: Trenton Weathers MD;  Location: [facility];  Service: Urology;  Laterality: N/A;    TONSILLECTOMY      TRANSPROSTATIC TISSUE RETRACTION N/A 8/15/2018    Performed by Trenton Weathers MD at [facility]         FAMILY HISTORY:                Family History   Problem Relation Age of Onset    Cataracts Mother     Diabetes Mother     Hypertension Mother     Stroke Mother     Heart attack Sister     Drug abuse Brother     Hypertension Brother     Diabetes Sister     Hypertension Brother     Hypertension Sister        SOCIAL HISTORY:          Tobacco:   Social History     Tobacco Use   Smoking Status Never Smoker   Smokeless Tobacco Never Used       Alcohol use:    Social History     Substance and Sexual Activity   Alcohol Use No                 ALLERGIES:    Review of patient's allergies indicates:   Allergen Reactions    No known drug allergies        CURRENT MEDICATIONS:    Current Outpatient Medications   Medication Sig Dispense Refill    amLODIPine (NORVASC) 5 MG tablet Take 1 tablet (5 mg total) by mouth once daily. 90 tablet 1    ascorbate calcium (VITAMIN C ORAL) Take 1 tablet by mouth once daily.       "atorvastatin (LIPITOR) 40 MG tablet TAKE 1 TABLET BY MOUTH ONCE DAILY 30 tablet 0    fluticasone (FLONASE) 50 mcg/actuation nasal spray 2 sprays by Each Nare route once daily.      loratadine (CLARITIN) 10 mg tablet Take 1 tablet (10 mg total) by mouth once daily. 30 tablet 0    meloxicam (MOBIC) 15 MG tablet Take 15 mg by mouth once daily.      oxyCODONE-acetaminophen (PERCOCET) 5-325 mg per tablet Take 1 tablet by mouth every 4 (four) hours as needed for Pain. 10 tablet 0    phenazopyridine (PYRIDIUM) 100 MG tablet Take 1 tablet (100 mg total) by mouth 3 (three) times daily as needed for Pain. 20 tablet 1    tamsulosin (FLOMAX) 0.4 mg Cap Take 1 capsule (0.4 mg total) by mouth once daily. 30 capsule 11    vitamin E acetate (VITAMIN E ORAL) Take 1 tablet by mouth once daily.       No current facility-administered medications for this visit.                   REVIEW OF SYSTEMS:     Sleep related symptoms as per HPI.  Sleep Clinic ROS  6/21/2018   Difficulty breathing through the nose?  Sometimes   Sore throat or dry mouth in the morning? Sometimes   Irregular or very fast heart beat?  No   Shortness of breath?  No   Acid reflux? Yes   Body aches and pains?  Yes   Morning headaches? Yes   Dizziness? No   Mood changes?  No   Do you exercise?  No   Do you feel like moving your legs a lot?  No       Otherwise, a balance of systems reviewed is negative.          PHYSICAL EXAM:  Vitals:    09/18/18 1206   BP: (!) 140/90   Pulse: 87   Weight: 88.9 kg (195 lb 15.8 oz)   Height: 5' 6" (1.676 m)   PainSc: 0-No pain     Body mass index is 31.63 kg/m².     GENERAL: Overweight development, well groomed  HEENT:  Conjunctivae are non-erythematous; Pupils equal, round, and reactive to light; Nose is symmetrical; Nasal mucosa is congested b/l worse on the right side; Septum is midline; Inferior turbinates are normal; Nasal airflow is normal; Posterior pharynx is pink; Modified Mallampati: IV; Posterior palate is normal; " Tonsils surgically absent; Uvula is normal and pink;Tongue is normal; Dentition is fair; No TMJ tenderness; Jaw opening and protrusion without click and without discomfort.  NECK: Supple. Neck circumference is 15.5  inches.   RESPIRATORY: Chest is clear to auscultation.  Normal chest expansion and non-labored breathing at rest.  CARDIOVASCULAR: Normal S1, S2.  No murmurs, gallops or rubs. No carotid bruits bilaterally.  EXTREMITIES: No edema. No clubbing. No cyanosis. Station normal. Gait normal.        NEURO/PSYCH: Oriented to time, place and person. Normal attention span and concentration. Affect is full. Mood is normal.                                              ASSESSMENT:    Sleep apnea,  The patient symptomatically has snoring, interrupted sleep, witnessed apneic spells, and excessive daytime sleepiness with findings of crowded oral airway and elevated body mass index. Medical co-morbidities: systemic HTN and obesity. This warrants further treatment in view of the home sleep study showing severe ANGELIC AHI 39.    Hx tonsillectomy     Seasonal allergies, takes Claritin at bedtime prn basis    PLAN:  angelic  -Started on Auto CPAP 5- 20 cm H20 , DME=[facility], will do formal mask fitting   Suggested to use Flonase and Claritin for nasal congestion as to reduce issues with the future PAP complaince.  Suggested to avoid drowsy driving.    Education: During our discussion today, we talked about the etiology of obstructive sleep apnea as well as the potential ramifications of untreated sleep apnea, which could include daytime sleepiness, hypertension, heart disease and/or stroke. We discussed potential treatment options, which could include weight loss, body positioning, continuous positive airway pressure (CPAP), OA, EPAP, or referral for surgical consideration.       Allergic rhinitis:  Will advise to start using nasal saline every day one - two squirt each nostril if that does not work  Use Flonase 1-2 sprays in each  nostril at bedtime to help with nasal congestion. You need to use every day for at least a month to determine if it will be beneficial for you. Watch for nose bleeds. If this occurs, stop the medication and call the office.      HTN:  Pt to contact PCP regarding elevated BP today         Precautions: The patient was advised to abstain from driving should they feel sleepy  or drowsy.     Thank you for allowing me the opportunity to participate in the care of your patient.

## 2018-09-18 NOTE — PATIENT INSTRUCTIONS
Sleep Hygiene Practices    1. Try going to bed only when you are drowsy.    ?    2. If you are unable to fall asleep or stay asleep, leave your bedroom and engage in a quiet activity elsewhere. Do not permit yourself to fall asleep outside the bedroom. Return to bed when and only when you are sleepy. Repeat this process as often as necessary throughout night.    3. Maintain regular wake-up time, even on days off work & weekends    4. Use your bedroom for sleep and sex    5. Avoid napping during the daytime. If daytime sleepiness becomes overwhelming, limit nap time to a single nap of less than 1hr, no later than 3pm.    6. Distract your mind. Avoid clock watching. Lying in bed unable to sleep and frustrated needs to be avoided. Try reading or watching a videotape or listening to books on tape. It may be necessary to go into another room to do these.    7. Avoid caffeine within 4-6hrs of bedtime    8. Avoid use of nicotine close to bedtime    9. do not drink alcoholic beverages within 4-6hrs of bedtime    10. While a light snack before bedtime can help promote sound sleep, avoid large meals.    11. Obtain regular exercise, but avoid strenuous exercise within 4hrs of bedtime    12. Minimize light, noise, and extremes in temperature in the bedroom.    13. Precautions: The patient was advised to abstain from driving should they feel sleepy or drowsy.  ?      Will advise to start using nasal saline every day one - two squirt each nostril if that does not work  Use Flonase 1-2 sprays in each nostril at bedtime to help with nasal congestion. You need to use every day for at least a month to determine if it will be beneficial for you. Watch for nose bleeds. If this occurs, stop the medication and call the office.        You are advised to abstain from driving should you feel sleepy or drowsy.        * This information is provided had been directly obtained from the American Academy of Sleep Medicine wellness booklet on  sleep hygiene. ([facility], [address] [place] [address]

## 2018-11-05 DIAGNOSIS — E78.00 HYPERCHOLESTEREMIA: ICD-10-CM

## 2018-11-05 RX ORDER — ATORVASTATIN CALCIUM 40 MG/1
TABLET, FILM COATED ORAL
Qty: 90 TABLET | Refills: 1 | Status: SHIPPED | OUTPATIENT
Start: 2018-11-05 | End: 2020-01-07

## 2018-11-05 NOTE — TELEPHONE ENCOUNTER
Made an attempt calling patient to remind him of a message that his pcp sent for him via the patient portal a month ago.  Requested that he review message before requesting for another medication refill   And also if he has any question to message or call us back.

## 2018-11-25 RX ORDER — LISINOPRIL 5 MG/1
TABLET ORAL
Qty: 30 TABLET | Refills: 0 | OUTPATIENT
Start: 2018-11-25

## 2018-11-29 ENCOUNTER — OFFICE VISIT (OUTPATIENT)
Dept: INTERNAL MEDICINE | Facility: CLINIC | Age: 63
End: 2018-11-29
Attending: FAMILY MEDICINE
Payer: COMMERCIAL

## 2018-11-29 VITALS
OXYGEN SATURATION: 96 % | TEMPERATURE: 98 F | WEIGHT: 196.19 LBS | HEIGHT: 66 IN | HEART RATE: 97 BPM | BODY MASS INDEX: 31.53 KG/M2 | SYSTOLIC BLOOD PRESSURE: 112 MMHG | DIASTOLIC BLOOD PRESSURE: 62 MMHG

## 2018-11-29 DIAGNOSIS — H60.91 OTITIS EXTERNA OF RIGHT EAR, UNSPECIFIED CHRONICITY, UNSPECIFIED TYPE: Primary | ICD-10-CM

## 2018-11-29 DIAGNOSIS — H65.199 ACUTE MIDDLE EAR EFFUSION, UNSPECIFIED LATERALITY: ICD-10-CM

## 2018-11-29 PROCEDURE — 99999 PR PBB SHADOW E&M-EST. PATIENT-LVL III: CPT | Mod: PBBFAC,,, | Performed by: FAMILY MEDICINE

## 2018-11-29 PROCEDURE — 96372 THER/PROPH/DIAG INJ SC/IM: CPT | Mod: S$GLB,,, | Performed by: FAMILY MEDICINE

## 2018-11-29 PROCEDURE — 3008F BODY MASS INDEX DOCD: CPT | Mod: CPTII,S$GLB,, | Performed by: FAMILY MEDICINE

## 2018-11-29 PROCEDURE — 99214 OFFICE O/P EST MOD 30 MIN: CPT | Mod: 25,S$GLB,, | Performed by: FAMILY MEDICINE

## 2018-11-29 PROCEDURE — 3078F DIAST BP <80 MM HG: CPT | Mod: CPTII,S$GLB,, | Performed by: FAMILY MEDICINE

## 2018-11-29 PROCEDURE — 3074F SYST BP LT 130 MM HG: CPT | Mod: CPTII,S$GLB,, | Performed by: FAMILY MEDICINE

## 2018-11-29 RX ORDER — MONTELUKAST SODIUM 10 MG/1
10 TABLET ORAL NIGHTLY
Qty: 30 TABLET | Refills: 0 | Status: SHIPPED | OUTPATIENT
Start: 2018-11-29 | End: 2018-12-29

## 2018-11-29 RX ORDER — TRIAMCINOLONE ACETONIDE 40 MG/ML
40 INJECTION, SUSPENSION INTRA-ARTICULAR; INTRAMUSCULAR
Status: COMPLETED | OUTPATIENT
Start: 2018-11-29 | End: 2018-11-29

## 2018-11-29 RX ORDER — AZELASTINE 1 MG/ML
1 SPRAY, METERED NASAL 2 TIMES DAILY
Qty: 30 ML | Refills: 1 | Status: SHIPPED | OUTPATIENT
Start: 2018-11-29 | End: 2020-01-07

## 2018-11-29 RX ORDER — PREDNISONE 20 MG/1
20 TABLET ORAL DAILY
Qty: 7 TABLET | Refills: 0 | Status: SHIPPED | OUTPATIENT
Start: 2018-11-29 | End: 2018-12-09

## 2018-11-29 RX ORDER — CIPROFLOXACIN 0.5 MG/.25ML
0.25 SOLUTION/ DROPS AURICULAR (OTIC) 2 TIMES DAILY
Qty: 14 VIAL | Refills: 0 | Status: SHIPPED | OUTPATIENT
Start: 2018-11-29 | End: 2018-12-06

## 2018-11-29 RX ADMIN — TRIAMCINOLONE ACETONIDE 40 MG: 40 INJECTION, SUSPENSION INTRA-ARTICULAR; INTRAMUSCULAR at 04:11

## 2018-11-29 NOTE — PATIENT INSTRUCTIONS
100 fl oz water daily       Earache, No Infection (Adult)  Earaches can happen without an infection. This occurs when air and fluid build up behind the eardrum causing a feeling of fullness and discomfort and reduced hearing. This is called otitis media with effusion (OME) or serous otitis media. It means there is fluid in the middle ear. It is not the same as acute otitis media, which is typically from infection.  OME can happen when you have a cold if congestion blocks the passage that drains the middle ear. This passage is called the eustachian tube. OME may also occur with nasal allergies or after a bacterial middle ear infection.    The pain or discomfort may come and go. You may hear clicking or popping sounds when you chew or swallow. You may feel that your balance is off. Or you may hear ringing in the ear.  It often takes from several weeks up to 3 months for the fluid to clear on its own. Oral pain relievers and ear drops help if there is pain. Decongestants and antihistamines sometimes help. Antibiotics don't help since there is no infection. Your doctor may prescribe a nasal spray to help reduce swelling in the nose and eustachian tube. This can allow the ear to drain.  If your OME doesn't improve after 3 months, surgery may be used to drain the fluid and insert a small tube in the eardrum to allow continued drainage.  Because the middle ear fluid can become infected, it is important to watch for signs of an ear infection which may develop later. These signs include increased ear pain, fever, or drainage from the ear.  Home care  The following guidelines will help you care for yourself at home:  · You may use over-the-counter medicine as directed to control pain, unless another medicine was prescribed. If you have chronic liver or kidney disease or ever had a stomach ulcer or GI bleeding, talk with your doctor before using these medicines. Aspirin should never be used in anyone under 18 years of age who  is ill with a fever. It may cause severe liver damage.  · You may use over-the-counter decongestants such as phenylephrine or pseudoephedrine. But they are not always helpful. Don't use nasal spray decongestants more than 3 days. Longer use can make congestion worse. Prescription nasal sprays from your doctor don't typically have those restrictions.  · Antihistamines may help if you are also having allergy symptoms.  · You may use medicines such as guaifenesin to thin mucus and promote drainage.  Follow-up care  Follow up with your healthcare provider or as advised if you are not feeling better after 3 days.  When to seek medical advice  Call your healthcare provider right away if any of the following occur:  · Your ear pain gets worse or does not start to improve   · Fever of 100.4°F (38°C) or higher, or as directed by your healthcare provider  · Fluid or blood draining from the ear  · Headache or sinus pain  · Stiff neck  · Unusual drowsiness or confusion  Date Last Reviewed: 10/1/2016  © 7568-3354 [employer]. [address] [place] [address], [place] [address]. All rights reserved. This information is not intended as a substitute for professional medical care. Always follow your healthcare professional's instructions.

## 2018-11-29 NOTE — PROGRESS NOTES
"Per order, patient to receive 1mL of Kenalog (triamcinolone acetonide) 40mg/mL. The area of injection was palpated using the medial fold and the iliac crest as anatomical landmarks. Patient was advised to relax the muscle. The area was cleaned with alcohol and allowed to dry. Using a 25g 1.5" needle, 1mL of Kenalog (triamcinolone acetonide) 40mg/mL was placed intramuscularly into the right upper outer gluteal quadrant. Patient experienced no complications and was discharged in stable condition. Kenalog Lot: [id number] Exp: [id number]  "

## 2018-11-29 NOTE — PROGRESS NOTES
"Subjective:      Patient ID: Aaron Guerra is a 63 y.o. male.    Chief Complaint: Nasal Congestion; Hypertension; and Cerumen Impaction    HPI   This is a new patient to me. 2 days ago started with off balance feeling happened from sitting to standing. He denies chest pain, sob with it. It has stayed with him for the last few days. 4 days ago chills, aches, runny nose, sinus pressure, sinus headache. Right ear is clogged. No fevers, cough, sore throat, wheezing or sob. He drinks black tea once a day.  No weakness in the extremities.     Review of Systems   Constitutional: Negative.    HENT: Positive for congestion, postnasal drip and rhinorrhea. Negative for ear discharge.    Respiratory: Negative.    Cardiovascular: Negative.    Gastrointestinal: Negative.    Genitourinary: Negative for dysuria.     I personally reviewed Past Medical History, Past Surgical history,  Past Social History and Family History      Objective:   /62   Pulse 97   Temp 98.1 °F (36.7 °C) (Oral)   Ht 5' 6" (1.676 m)   Wt 89 kg (196 lb 3.4 oz)   SpO2 96%   BMI 31.67 kg/m²     Physical Exam   Constitutional: He is oriented to person, place, and time. He appears well-developed and well-nourished. No distress.   HENT:   Head: Normocephalic and atraumatic.   Right Ear: Hearing and external ear normal. Tympanic membrane is bulging.   Left Ear: Hearing and external ear normal. Tympanic membrane is bulging.   Nose: Mucosal edema present. Right sinus exhibits no maxillary sinus tenderness and no frontal sinus tenderness. Left sinus exhibits no maxillary sinus tenderness and no frontal sinus tenderness.   Mouth/Throat: Uvula is midline and oropharynx is clear and moist. No oropharyngeal exudate.   Edema in right ear canal    Eyes: Conjunctivae and EOM are normal. Pupils are equal, round, and reactive to light.   Neck: Normal range of motion. Neck supple.   Cardiovascular: Normal rate, regular rhythm, normal heart sounds and intact distal " pulses. Exam reveals no gallop and no friction rub.   No murmur heard.  Pulmonary/Chest: Effort normal and breath sounds normal. No stridor. No respiratory distress. He has no wheezes. He has no rales. He exhibits no tenderness.   Abdominal: Soft. Bowel sounds are normal. He exhibits no distension and no mass. There is no tenderness. There is no rebound and no guarding.   Neurological: He is alert and oriented to person, place, and time. No cranial nerve deficit. Coordination normal.   Skin: He is not diaphoretic.   Vitals reviewed.      1. Otitis externa of right ear, unspecified chronicity, unspecified type    2. Acute middle ear effusion, unspecified laterality        1-2. Will treat with cipro, singulair, astelin, return sooner if no improvement, has follow up for next Tuesday with PCP for re-evaluation        No orders of the defined types were placed in this encounter.    Medications Ordered This Encounter   Medications    azelastine (ASTELIN) 137 mcg (0.1 %) nasal spray     Si spray (137 mcg total) by Nasal route 2 (two) times daily.     Dispense:  30 mL     Refill:  1    ciprofloxacin HCl 0.2 % otic solution     Sig: Place 1 vial (0.25 mLs total) into the right ear 2 (two) times daily. for 7 days     Dispense:  14 vial     Refill:  0    montelukast (SINGULAIR) 10 mg tablet     Sig: Take 1 tablet (10 mg total) by mouth every evening.     Dispense:  30 tablet     Refill:  0    predniSONE (DELTASONE) 20 MG tablet     Sig: Take 1 tablet (20 mg total) by mouth once daily. for 10 days     Dispense:  7 tablet     Refill:  0    triamcinolone acetonide injection 40 mg

## 2018-11-30 ENCOUNTER — TELEPHONE (OUTPATIENT)
Dept: INTERNAL MEDICINE | Facility: CLINIC | Age: 63
End: 2018-11-30

## 2018-11-30 NOTE — TELEPHONE ENCOUNTER
----- Message from Yanadm Ti sent at 11/30/2018  9:43 AM CST -----  Contact: Michelle/[facility]  Name of Who is Calling: Michelle        What is the request in detail: Please confirm directions/quantity regarding predniSONE (DELTASONE) 20 MG tablet. Contact and advise at your earliest convenience.  Thanks-          Can the clinic reply by [facility]: No    What Number to Call Back if not [facility]: [phone]    1:01 PM  Patient was prescribed Prednisone with the instruction to take daily for 10 days. Quantity was only written for 7 tablets. Please advise.

## 2018-12-04 ENCOUNTER — OFFICE VISIT (OUTPATIENT)
Dept: UROLOGY | Facility: CLINIC | Age: 63
End: 2018-12-04
Attending: UROLOGY
Payer: COMMERCIAL

## 2018-12-04 VITALS
HEIGHT: 66 IN | BODY MASS INDEX: 30.53 KG/M2 | SYSTOLIC BLOOD PRESSURE: 154 MMHG | WEIGHT: 190 LBS | DIASTOLIC BLOOD PRESSURE: 102 MMHG | HEART RATE: 74 BPM

## 2018-12-04 DIAGNOSIS — N40.1 BPH WITH OBSTRUCTION/LOWER URINARY TRACT SYMPTOMS: Primary | ICD-10-CM

## 2018-12-04 DIAGNOSIS — N13.8 BPH WITH OBSTRUCTION/LOWER URINARY TRACT SYMPTOMS: Primary | ICD-10-CM

## 2018-12-04 PROCEDURE — 3008F BODY MASS INDEX DOCD: CPT | Mod: CPTII,S$GLB,, | Performed by: UROLOGY

## 2018-12-04 PROCEDURE — 3077F SYST BP >= 140 MM HG: CPT | Mod: CPTII,S$GLB,, | Performed by: UROLOGY

## 2018-12-04 PROCEDURE — 99214 OFFICE O/P EST MOD 30 MIN: CPT | Mod: S$GLB,,, | Performed by: UROLOGY

## 2018-12-04 PROCEDURE — 3080F DIAST BP >= 90 MM HG: CPT | Mod: CPTII,S$GLB,, | Performed by: UROLOGY

## 2018-12-04 RX ORDER — SILDENAFIL 100 MG/1
100 TABLET, FILM COATED ORAL DAILY PRN
Qty: 10 TABLET | Refills: 11 | Status: SHIPPED | OUTPATIENT
Start: 2018-12-04 | End: 2018-12-05 | Stop reason: SDUPTHER

## 2018-12-04 NOTE — PROGRESS NOTES
"Subjective:      Aaron Guerra is a 63 y.o. male who returns today regarding his BPH.    He is s/p Urolift 8/15/2018. No problems post-op.    His preop AUASS was 10/2 and it was 16 at initial FU.    His AUASS today is 7/3. Mostly bothered by nocturia 3-4 times per night.    He has not stopped all BPH medications.    He also today reports ED with difficulty maintaining erection.    Also had elevated PSA (6.4) at initial visit, though this improved to 5.1 with 32% free on FU.    The following portions of the patient's history were reviewed and updated as appropriate: allergies, current medications, past family history, past medical history, past social history, past surgical history and problem list.    Review of Systems  A comprehensive multipoint review of systems was negative except as otherwise stated in the HPI.     Objective:   Vitals: /85 (BP Location: Right arm, Patient Position: Sitting, BP Method: Large (Automatic))   Pulse 61   Ht 5' 11" (1.803 m)   Wt 109.6 kg (241 lb 10 oz)   BMI 33.70 kg/m²     Physical Exam   General: alert and oriented, no acute distress  Respiratory: Symmetric expansion, non-labored breathing  Neuro: no gross deficits  Psych: normal judgment and insight, normal mood/affect and non-anxious    Bladder Scan PVR: 17cc      Lab Review   Urinalysis demonstrates negative for all components  Lab Results   Component Value Date    WBC 4.05 05/21/2018    HGB 11.8 (L) 05/21/2018    HCT 39.2 (L) 05/21/2018    MCV 73 (L) 05/21/2018     05/21/2018     Lab Results   Component Value Date    CREATININE 1.2 09/12/2018    BUN 16 09/12/2018     Lab Results   Component Value Date    PSA 2.25 02/11/2012    PSADIAG 5.7 (H) 05/21/2018         Assessment and Plan:   1. Benign non-nodular prostatic hyperplasia with lower urinary tract symptoms  -- S/p Urolift  -- DC flomax     2. Elevated PSA  -- PSA in 3 mos    3. ED  -- Trial viagra    FU 3 mos  "

## 2018-12-05 DIAGNOSIS — N52.9 ERECTILE DYSFUNCTION, UNSPECIFIED ERECTILE DYSFUNCTION TYPE: Primary | ICD-10-CM

## 2018-12-05 NOTE — TELEPHONE ENCOUNTER
Pt is agreeable to go to [facility].  I gave him the price of Generic viagra there and he would like 30 tablets with 5 refills, rather than 10 tablets at a time.

## 2018-12-05 NOTE — TELEPHONE ENCOUNTER
----- Message from Jacque Jarrett sent at 12/5/2018 11:47 AM CST -----  Contact: pt   Name of Who is Calling: KEITH DIAMOND [[id number]]    What is the request in detail: Patient states the RX sildenafil (VIAGRA) 100 MG tablet cost $288, requesting a call from staff in regards to that. Please contact to further discuss and advise    Can the clinic reply by [facility]: No       What Number to Call Back if not [facility]: [phone]

## 2018-12-06 RX ORDER — SILDENAFIL 100 MG/1
100 TABLET, FILM COATED ORAL DAILY PRN
Qty: 30 TABLET | Refills: 5 | Status: SHIPPED | OUTPATIENT
Start: 2018-12-06 | End: 2020-01-07

## 2018-12-18 PROBLEM — R91.1 PULMONARY NODULE: Status: RESOLVED | Noted: 2018-07-11 | Resolved: 2018-12-18

## 2018-12-18 NOTE — PROGRESS NOTES
"Subjective:       Patient ID: Aaron Guerra is a 63 y.o. male.    Chief Complaint: Hypertension    HPI     The patient returns today for follow-up.  My last contact with him was by email in September.  At that time, I recommended a referral to the [facility], to help achieve improved blood pressure control.  The patient does remain at moderate risk for heart disease, despite significant lowering of his cholesterol levels with atorvastatin.    He has also been evaluated by Sleep Medicine, and found have obstructive sleep apnea.  He was prescribed auto CPAP.  His sleep has improved, with "no snoring."  Energy level has improved.      He is s/p Urolift procedure with improvement in nocturia (now off tamsulosin).    He has been having knee pain, and is considering seeing Dr. Zavala who previously diagnosed him with meniscal tear.    Patient Active Problem List   Diagnosis    Hypertension    Colon polyp    Allergic rhinitis    Benign prostatic hyperplasia with lower urinary tract symptoms    Hypercholesteremia    ANGELIC (obstructive sleep apnea)    [place] cardiac risk 10-20% in next 10 years    Primary osteoarthritis of left shoulder    DJD (degenerative joint disease), cervical    Elevated PSA    Beta thalassemia minor    BPH with obstruction/lower urinary tract symptoms       Current Outpatient Medications:     amLODIPine (NORVASC) 5 MG tablet, Take 1 tablet (5 mg total) by mouth once daily., Disp: 90 tablet, Rfl: 1    ascorbate calcium (VITAMIN C ORAL), Take 1 tablet by mouth once daily., Disp: , Rfl:     aspirin (ECOTRIN) 81 MG EC tablet, Take 81 mg by mouth once daily., Disp: , Rfl:     atorvastatin (LIPITOR) 40 MG tablet, TAKE 1 TABLET BY MOUTH ONCE DAILY, Disp: 90 tablet, Rfl: 1    azelastine (ASTELIN) 137 mcg (0.1 %) nasal spray, 1 spray (137 mcg total) by Nasal route 2 (two) times daily., Disp: 30 mL, Rfl: 1    fluticasone (FLONASE) 50 mcg/actuation nasal spray, 2 sprays " "by Each Nare route once daily., Disp: , Rfl:     meloxicam (MOBIC) 15 MG tablet, Take 15 mg by mouth once daily., Disp: , Rfl:     montelukast (SINGULAIR) 10 mg tablet, Take 1 tablet (10 mg total) by mouth every evening., Disp: 30 tablet, Rfl: 0    sildenafil (VIAGRA) 100 MG tablet, Take 1 tablet (100 mg total) by mouth daily as needed for Erectile Dysfunction., Disp: 30 tablet, Rfl: 5    vitamin E acetate (VITAMIN E ORAL), Take 1 tablet by mouth once daily., Disp: , Rfl:     The following portions of the patient's history were reviewed and updated as appropriate: allergies, past family history, past medical history, past social history and past surgical history.    Review of Systems   Constitutional: Negative for fatigue and unexpected weight change.   HENT: Negative for ear discharge, ear pain, hearing loss, tinnitus and voice change.    Eyes: Negative for pain.   Respiratory: Negative for cough and shortness of breath.    Cardiovascular: Negative for chest pain, palpitations and leg swelling.   Gastrointestinal: Negative for abdominal pain, blood in stool, constipation, diarrhea, nausea and vomiting.   Genitourinary: Negative for decreased urine volume, difficulty urinating, dysuria, enuresis, frequency, hematuria and urgency.   Musculoskeletal: Negative for arthralgias, back pain and myalgias.   Skin: Negative for rash.   Neurological: Negative for dizziness, weakness, light-headedness and headaches.   Hematological: Does not bruise/bleed easily.   Psychiatric/Behavioral: Negative for dysphoric mood and sleep disturbance. The patient is not nervous/anxious.          Objective:      /80   Pulse 74   Ht 5' 6" (1.676 m)   Wt 88.9 kg (196 lb)   SpO2 97%   BMI 31.64 kg/m²     Physical Exam   Constitutional: He is oriented to person, place, and time. He appears well-developed and well-nourished. He is cooperative.   HENT:   Head: Normocephalic and atraumatic.   Right Ear: External ear normal.   Left " "Ear: External ear normal.   Nose: Nose normal.   Mouth/Throat: Oropharynx is clear and moist and mucous membranes are normal. No oropharyngeal exudate.   Eyes: Conjunctivae are normal. No scleral icterus.   Neck: Neck supple. No JVD present. Carotid bruit is not present. No thyromegaly present.   Cardiovascular: Normal rate, regular rhythm, normal heart sounds and normal pulses. Exam reveals no gallop and no friction rub.   No murmur heard.  Pulmonary/Chest: Effort normal and breath sounds normal. He has no wheezes. He has no rhonchi. He has no rales.   Abdominal: Soft. Bowel sounds are normal. He exhibits no distension and no mass. There is no splenomegaly or hepatomegaly. There is no tenderness.   Musculoskeletal: Normal range of motion. He exhibits no edema or tenderness.   Lymphadenopathy:     He has no cervical adenopathy.     He has no axillary adenopathy.   Neurological: He is alert and oriented to person, place, and time. He has normal strength and normal reflexes. No cranial nerve deficit or sensory deficit. Coordination normal.   Skin: Skin is warm and dry.   Psychiatric: He has a normal mood and affect.   Vitals reviewed.        Assessment:       1. Hypertension, unspecified type    2. Hypercholesteremia    3. ANGELIC (obstructive sleep apnea)        Plan:       Recommend revisit with Dr. Zavala re: possible knee arthroscopy.  Flu/Prevnar offered today.  Refer to [facility].  Discussed water aerobics/exercises.      Further recommendations to follow after above.            "This note will not be shared with the patient."  "

## 2018-12-19 ENCOUNTER — OFFICE VISIT (OUTPATIENT)
Dept: FAMILY MEDICINE | Facility: CLINIC | Age: 63
End: 2018-12-19
Attending: FAMILY MEDICINE
Payer: COMMERCIAL

## 2018-12-19 ENCOUNTER — TELEPHONE (OUTPATIENT)
Dept: FAMILY MEDICINE | Facility: CLINIC | Age: 63
End: 2018-12-19

## 2018-12-19 VITALS
WEIGHT: 196 LBS | OXYGEN SATURATION: 97 % | HEART RATE: 74 BPM | SYSTOLIC BLOOD PRESSURE: 138 MMHG | DIASTOLIC BLOOD PRESSURE: 80 MMHG | HEIGHT: 66 IN | BODY MASS INDEX: 31.5 KG/M2

## 2018-12-19 DIAGNOSIS — E78.00 HYPERCHOLESTEREMIA: ICD-10-CM

## 2018-12-19 DIAGNOSIS — G47.33 OSA (OBSTRUCTIVE SLEEP APNEA): ICD-10-CM

## 2018-12-19 DIAGNOSIS — I10 HYPERTENSION, UNSPECIFIED TYPE: Primary | ICD-10-CM

## 2018-12-19 PROCEDURE — 90670 PCV13 VACCINE IM: CPT | Mod: S$GLB,,, | Performed by: FAMILY MEDICINE

## 2018-12-19 PROCEDURE — 99999 PR PBB SHADOW E&M-EST. PATIENT-LVL III: CPT | Mod: PBBFAC,,, | Performed by: FAMILY MEDICINE

## 2018-12-19 PROCEDURE — 90471 IMMUNIZATION ADMIN: CPT | Mod: S$GLB,,, | Performed by: FAMILY MEDICINE

## 2018-12-19 PROCEDURE — 3079F DIAST BP 80-89 MM HG: CPT | Mod: CPTII,S$GLB,, | Performed by: FAMILY MEDICINE

## 2018-12-19 PROCEDURE — 3008F BODY MASS INDEX DOCD: CPT | Mod: CPTII,S$GLB,, | Performed by: FAMILY MEDICINE

## 2018-12-19 PROCEDURE — 99214 OFFICE O/P EST MOD 30 MIN: CPT | Mod: 25,S$GLB,, | Performed by: FAMILY MEDICINE

## 2018-12-19 PROCEDURE — 3075F SYST BP GE 130 - 139MM HG: CPT | Mod: CPTII,S$GLB,, | Performed by: FAMILY MEDICINE

## 2018-12-19 RX ORDER — ASPIRIN 81 MG/1
81 TABLET ORAL DAILY
COMMUNITY

## 2018-12-19 NOTE — TELEPHONE ENCOUNTER
As the patient was leaving from his office visit. Patient states he forgot to mention his dizzy spells. Patient states he's been having dizzy spells ever since his office visit with Dr. Baker on 11/29/18. Patient states he was seen for ear fullness.     Patient states the dizziness occurs periodically throughout the day. Would you like to refer the patient to ENT?

## 2018-12-20 RX ORDER — MECLIZINE HYDROCHLORIDE 25 MG/1
25 TABLET ORAL 3 TIMES DAILY PRN
Qty: 30 TABLET | Refills: 0 | Status: SHIPPED | OUTPATIENT
Start: 2018-12-20 | End: 2020-01-07

## 2018-12-20 NOTE — TELEPHONE ENCOUNTER
I will prescribe a trial of meclizine for suspected labyrinthitis.  Patient can remain in contact with us through this patient portal, with symptom update.

## 2018-12-21 NOTE — TELEPHONE ENCOUNTER
Attempted to contact the patient to inform him to  his Rx. Patient did not answer. Left detailed voicemail.

## 2018-12-30 DIAGNOSIS — Z76.0 ENCOUNTER FOR MEDICATION REFILL: ICD-10-CM

## 2018-12-31 RX ORDER — AMLODIPINE BESYLATE 5 MG/1
TABLET ORAL
Qty: 90 TABLET | Refills: 1 | Status: SHIPPED | OUTPATIENT
Start: 2018-12-31 | End: 2020-01-07

## 2019-01-02 DIAGNOSIS — Z76.0 ENCOUNTER FOR MEDICATION REFILL: ICD-10-CM

## 2019-01-02 RX ORDER — MELOXICAM 15 MG/1
15 TABLET ORAL DAILY
OUTPATIENT
Start: 2019-01-02

## 2019-01-02 RX ORDER — LORATADINE 10 MG/1
10 TABLET ORAL DAILY
Qty: 30 TABLET | Refills: 11 | Status: SHIPPED | OUTPATIENT
Start: 2019-01-02 | End: 2020-01-07

## 2019-11-15 ENCOUNTER — TELEPHONE (OUTPATIENT)
Dept: FAMILY MEDICINE | Facility: CLINIC | Age: 64
End: 2019-11-15

## 2019-11-15 NOTE — TELEPHONE ENCOUNTER
----- Message from Mercedes Leonard sent at 11/15/2019  2:33 PM CST -----  Contact: KEITH DIAMOND   Name of Who is Calling: KEITH DIAMOND       What is the request in detail: Patient is requesting a call back he wants to know if  accepts [facility] blue connect and is a tier 1 PROVIDER      Can the clinic reply by [facility]: no      What Number to Call Back if not in [facility]: [phone]

## 2020-01-07 ENCOUNTER — OFFICE VISIT (OUTPATIENT)
Dept: FAMILY MEDICINE | Facility: CLINIC | Age: 65
End: 2020-01-07
Attending: FAMILY MEDICINE
Payer: COMMERCIAL

## 2020-01-07 ENCOUNTER — LAB VISIT (OUTPATIENT)
Dept: LAB | Facility: HOSPITAL | Age: 65
End: 2020-01-07
Attending: UROLOGY
Payer: COMMERCIAL

## 2020-01-07 VITALS
WEIGHT: 190 LBS | OXYGEN SATURATION: 96 % | HEART RATE: 73 BPM | DIASTOLIC BLOOD PRESSURE: 92 MMHG | BODY MASS INDEX: 30.53 KG/M2 | HEIGHT: 66 IN | SYSTOLIC BLOOD PRESSURE: 144 MMHG

## 2020-01-07 DIAGNOSIS — N13.8 BPH WITH OBSTRUCTION/LOWER URINARY TRACT SYMPTOMS: ICD-10-CM

## 2020-01-07 DIAGNOSIS — N40.1 BENIGN PROSTATIC HYPERPLASIA WITH NOCTURIA: ICD-10-CM

## 2020-01-07 DIAGNOSIS — R35.1 BENIGN PROSTATIC HYPERPLASIA WITH NOCTURIA: ICD-10-CM

## 2020-01-07 DIAGNOSIS — K63.5 POLYP OF COLON, UNSPECIFIED PART OF COLON, UNSPECIFIED TYPE: ICD-10-CM

## 2020-01-07 DIAGNOSIS — Z00.00 ROUTINE GENERAL MEDICAL EXAMINATION AT A HEALTH CARE FACILITY: Primary | ICD-10-CM

## 2020-01-07 DIAGNOSIS — N40.1 BPH WITH OBSTRUCTION/LOWER URINARY TRACT SYMPTOMS: ICD-10-CM

## 2020-01-07 DIAGNOSIS — E78.00 HYPERCHOLESTEREMIA: ICD-10-CM

## 2020-01-07 DIAGNOSIS — G47.33 OSA (OBSTRUCTIVE SLEEP APNEA): ICD-10-CM

## 2020-01-07 DIAGNOSIS — I10 HYPERTENSION, UNSPECIFIED TYPE: ICD-10-CM

## 2020-01-07 DIAGNOSIS — R91.1 PULMONARY NODULE: ICD-10-CM

## 2020-01-07 DIAGNOSIS — R97.20 ELEVATED PSA: ICD-10-CM

## 2020-01-07 DIAGNOSIS — D56.3 BETA THALASSEMIA MINOR: ICD-10-CM

## 2020-01-07 LAB
ALBUMIN SERPL BCP-MCNC: 4.3 G/DL (ref 3.5–5.2)
ALP SERPL-CCNC: 93 U/L (ref 55–135)
ALT SERPL W/O P-5'-P-CCNC: 21 U/L (ref 10–44)
ANION GAP SERPL CALC-SCNC: 7 MMOL/L (ref 8–16)
AST SERPL-CCNC: 23 U/L (ref 10–40)
BILIRUB SERPL-MCNC: 0.6 MG/DL (ref 0.1–1)
BUN SERPL-MCNC: 12 MG/DL (ref 8–23)
CALCIUM SERPL-MCNC: 9.7 MG/DL (ref 8.7–10.5)
CHLORIDE SERPL-SCNC: 104 MMOL/L (ref 95–110)
CHOLEST SERPL-MCNC: 255 MG/DL (ref 120–199)
CHOLEST/HDLC SERPL: 5.8 {RATIO} (ref 2–5)
CO2 SERPL-SCNC: 27 MMOL/L (ref 23–29)
COMPLEXED PSA SERPL-MCNC: 7.3 NG/ML (ref 0–4)
CREAT SERPL-MCNC: 1.4 MG/DL (ref 0.5–1.4)
EST. GFR  (AFRICAN AMERICAN): >60 ML/MIN/1.73 M^2
EST. GFR  (NON AFRICAN AMERICAN): 53.1 ML/MIN/1.73 M^2
GLUCOSE SERPL-MCNC: 86 MG/DL (ref 70–110)
HDLC SERPL-MCNC: 44 MG/DL (ref 40–75)
HDLC SERPL: 17.3 % (ref 20–50)
LDLC SERPL CALC-MCNC: 194.8 MG/DL (ref 63–159)
NONHDLC SERPL-MCNC: 211 MG/DL
POTASSIUM SERPL-SCNC: 4.3 MMOL/L (ref 3.5–5.1)
PROT SERPL-MCNC: 8.2 G/DL (ref 6–8.4)
SODIUM SERPL-SCNC: 138 MMOL/L (ref 136–145)
TRIGL SERPL-MCNC: 81 MG/DL (ref 30–150)

## 2020-01-07 PROCEDURE — 80061 LIPID PANEL: CPT

## 2020-01-07 PROCEDURE — 36415 COLL VENOUS BLD VENIPUNCTURE: CPT | Mod: PO

## 2020-01-07 PROCEDURE — 3077F PR MOST RECENT SYSTOLIC BLOOD PRESSURE >= 140 MM HG: ICD-10-PCS | Mod: CPTII,S$GLB,, | Performed by: FAMILY MEDICINE

## 2020-01-07 PROCEDURE — 90472 FLU VACCINE (QUAD) GREATER THAN OR EQUAL TO 3YO PRESERVATIVE FREE IM: ICD-10-PCS | Mod: S$GLB,,, | Performed by: FAMILY MEDICINE

## 2020-01-07 PROCEDURE — 90471 IMMUNIZATION ADMIN: CPT | Mod: S$GLB,,, | Performed by: FAMILY MEDICINE

## 2020-01-07 PROCEDURE — 80053 COMPREHEN METABOLIC PANEL: CPT

## 2020-01-07 PROCEDURE — 99396 PREV VISIT EST AGE 40-64: CPT | Mod: 25,S$GLB,, | Performed by: FAMILY MEDICINE

## 2020-01-07 PROCEDURE — 3080F DIAST BP >= 90 MM HG: CPT | Mod: CPTII,S$GLB,, | Performed by: FAMILY MEDICINE

## 2020-01-07 PROCEDURE — 84153 ASSAY OF PSA TOTAL: CPT | Mod: 91

## 2020-01-07 PROCEDURE — 3077F SYST BP >= 140 MM HG: CPT | Mod: CPTII,S$GLB,, | Performed by: FAMILY MEDICINE

## 2020-01-07 PROCEDURE — 3080F PR MOST RECENT DIASTOLIC BLOOD PRESSURE >= 90 MM HG: ICD-10-PCS | Mod: CPTII,S$GLB,, | Performed by: FAMILY MEDICINE

## 2020-01-07 PROCEDURE — 90472 IMMUNIZATION ADMIN EACH ADD: CPT | Mod: S$GLB,,, | Performed by: FAMILY MEDICINE

## 2020-01-07 PROCEDURE — 99396 PR PREVENTIVE VISIT,EST,40-64: ICD-10-PCS | Mod: 25,S$GLB,, | Performed by: FAMILY MEDICINE

## 2020-01-07 PROCEDURE — 90686 FLU VACCINE (QUAD) GREATER THAN OR EQUAL TO 3YO PRESERVATIVE FREE IM: ICD-10-PCS | Mod: S$GLB,,, | Performed by: FAMILY MEDICINE

## 2020-01-07 PROCEDURE — 99999 PR PBB SHADOW E&M-EST. PATIENT-LVL III: ICD-10-PCS | Mod: PBBFAC,,, | Performed by: FAMILY MEDICINE

## 2020-01-07 PROCEDURE — 90732 PPSV23 VACC 2 YRS+ SUBQ/IM: CPT | Mod: S$GLB,,, | Performed by: FAMILY MEDICINE

## 2020-01-07 PROCEDURE — 90471 PNEUMOCOCCAL POLYSACCHARIDE VACCINE 23-VALENT =>2YO SQ IM: ICD-10-PCS | Mod: S$GLB,,, | Performed by: FAMILY MEDICINE

## 2020-01-07 PROCEDURE — 99999 PR PBB SHADOW E&M-EST. PATIENT-LVL III: CPT | Mod: PBBFAC,,, | Performed by: FAMILY MEDICINE

## 2020-01-07 PROCEDURE — 84154 ASSAY OF PSA FREE: CPT

## 2020-01-07 PROCEDURE — 90686 IIV4 VACC NO PRSV 0.5 ML IM: CPT | Mod: S$GLB,,, | Performed by: FAMILY MEDICINE

## 2020-01-07 PROCEDURE — 90732 PNEUMOCOCCAL POLYSACCHARIDE VACCINE 23-VALENT =>2YO SQ IM: ICD-10-PCS | Mod: S$GLB,,, | Performed by: FAMILY MEDICINE

## 2020-01-07 NOTE — PROGRESS NOTES
"Subjective:       Patient ID: Aaron Guerra is a 64 y.o. male who returns for his 1st visit with me in more than 1 year.  He was "without insurance."    Chief Complaint: Annual Exam and Flu Vaccine    HPI     The patient presents to the office today requesting a routine periodic health examination.    Patient Active Problem List   Diagnosis    Hypertension    Colon polyp    Allergic rhinitis    Benign prostatic hyperplasia with lower urinary tract symptoms    Hypercholesteremia    ANGELIC (obstructive sleep apnea)    [place] cardiac risk 10-20% in next 10 years    Primary osteoarthritis of left shoulder    DJD (degenerative joint disease), cervical    Elevated PSA    Beta thalassemia minor    Pulmonary nodule       Past Surgical History:   Procedure Laterality Date    COLONOSCOPY N/A 12/1/2015    Procedure: COLONOSCOPY;  Surgeon: Meagan Hill MD;  Location: [facility] ([address] [place] [address]);  Service: Endoscopy;  Laterality: N/A;    CYSTOSCOPY WITH INSERTION OF MINIMALLY INVASIVE IMPLANT TO ENLARGE PROSTATIC URETHRA N/A 8/15/2018    Procedure: TRANSPROSTATIC TISSUE RETRACTION;  Surgeon: Trenton Weathers MD;  Location: [facility];  Service: Urology;  Laterality: N/A;    TONSILLECTOMY           Current Outpatient Medications:     ascorbate calcium (VITAMIN C ORAL), Take 1 tablet by mouth once daily., Disp: , Rfl:     aspirin (ECOTRIN) 81 MG EC tablet, Take 81 mg by mouth once daily., Disp: , Rfl:     vitamin E acetate (VITAMIN E ORAL), Take 1 tablet by mouth once daily., Disp: , Rfl:     Review of patient's allergies indicates:  No Known Allergies    Family History   Problem Relation Age of Onset    Cataracts Mother     Diabetes Mother     Hypertension Mother     Stroke Mother     Heart attack Sister     Drug abuse Brother     Hypertension Brother     Diabetes Sister     Hypertension Brother     Hypertension Sister        Social History     Socioeconomic History    Marital status:      Spouse name: " Not on file    Number of children: 2    Years of education: Not on file    Highest education level: Not on file   Occupational History    Occupation: sales   Social Needs    Financial resource strain: Not hard at all    Food insecurity:     Worry: Never true     Inability: Never true    Transportation needs:     Medical: No     Non-medical: No   Tobacco Use    Smoking status: Never Smoker    Smokeless tobacco: Never Used   Substance and Sexual Activity    Alcohol use: No    Drug use: No    Sexual activity: Yes     Partners: Female   Lifestyle    Physical activity:     Days per week: 0 days     Minutes per session: Not on file    Stress: Very much   Relationships    Social connections:     Talks on phone: More than three times a week     Gets together: Once a week     Attends Alevism service: More than 4 times per year     Active member of club or organization: Yes     Attends meetings of clubs or organizations: More than 4 times per year     Relationship status:    Other Topics Concern    Not on file   Social History Narrative    The patient does not exercise regularly ().      He is not satisfied with weight.    Rates diet as good.    He does not drink at least 1/2 gallon water daily.    He drinks 2 coffee/tea/caffeine-containing soft drinks daily.    Total sleep time at night is 4-5 hours.    He does wear seat belts.    Hobbies include none.               Patient Care Team:  Lincoln Villalta Jr., MD as PCP - General (Family Medicine)  Faith Beaver MD as Consulting Physician (Urology)  James Zavala MD as Consulting Physician (Orthopedic Surgery)  Trenton Weathers MD as Consulting Physician (Urology)  Tiffany Velarde NP as Nurse Practitioner (Sleep Medicine)  Shanelle Alanis MD as Consulting Physician (Intensive Care)  Citlaly Matamoros OD as Consulting Physician (Optometry)      Review of Systems   Constitutional: Negative for fatigue and unexpected weight change.   HENT:  "Negative for ear discharge, ear pain, hearing loss, tinnitus and voice change.    Eyes: Negative for pain.   Respiratory: Negative for cough and shortness of breath.    Cardiovascular: Negative for chest pain, palpitations and leg swelling.   Gastrointestinal: Negative for abdominal pain, blood in stool, constipation, diarrhea, nausea and vomiting.   Genitourinary: Negative for decreased urine volume, difficulty urinating, dysuria, enuresis, frequency, hematuria and urgency.        Nocturia every 2-3 hours   Musculoskeletal: Negative for arthralgias, back pain and myalgias.   Skin: Negative for rash.   Neurological: Negative for dizziness, weakness, light-headedness and headaches.   Hematological: Does not bruise/bleed easily.   Psychiatric/Behavioral: Positive for dysphoric mood ("stessed" about finances) and sleep disturbance (decreased TST2/t nocturia). The patient is not nervous/anxious.          Objective:      BP (!) 144/92 (BP Location: Left arm, Patient Position: Sitting, BP Method: X-Large (Automatic))   Pulse 73   Ht 5' 6" (1.676 m)   Wt 86.2 kg (190 lb)   SpO2 96%   BMI 30.67 kg/m²     Physical Exam   Constitutional: He is oriented to person, place, and time. He appears well-developed and well-nourished. He is cooperative.   HENT:   Head: Normocephalic and atraumatic.   Right Ear: External ear normal.   Left Ear: External ear normal.   Nose: Nose normal.   Mouth/Throat: Oropharynx is clear and moist and mucous membranes are normal. No oropharyngeal exudate.   Eyes: Conjunctivae are normal. No scleral icterus.   Neck: Neck supple. No JVD present. Carotid bruit is not present. No thyromegaly present.   Cardiovascular: Normal rate, regular rhythm, normal heart sounds and normal pulses. Exam reveals no gallop and no friction rub.   No murmur heard.  Pulmonary/Chest: Effort normal and breath sounds normal. He has no wheezes. He has no rhonchi. He has no rales.   Abdominal: Soft. Bowel sounds are normal. " "He exhibits no distension and no mass. There is no splenomegaly or hepatomegaly. There is no tenderness.   Musculoskeletal: Normal range of motion. He exhibits no edema or tenderness.   Lymphadenopathy:     He has no cervical adenopathy.     He has no axillary adenopathy.   Neurological: He is alert and oriented to person, place, and time. He has normal strength and normal reflexes. No cranial nerve deficit or sensory deficit. Coordination normal.   Skin: Skin is warm and dry.   Psychiatric: He has a normal mood and affect.   Vitals reviewed.        Assessment:       1. Routine general medical examination at a health care facility    2. Hypertension, unspecified type    3. Hypercholesteremia    4. Pulmonary nodule    5. ANGELIC (obstructive sleep apnea)    6. Beta thalassemia minor    7. Benign prostatic hyperplasia with nocturia    8. Elevated PSA    9. Polyp of colon, unspecified part of colon, unspecified type        Plan:       Discussed routine examinations and screenings.   Offered influenza and pneumococcal vaccines.   Remainder of Health Maintenance reviewed - up to date.    Repeat CT chest, for surveillance of pulmonary nodule.    Labs (see Orders)   Orders Placed This Encounter    CT Chest Without Contrast    Pneumococcal Polysaccharide Vaccine (23 Valent) (SQ/IM)    Comprehensive metabolic panel    Lipid panel    Prostate Specific Antigen, Diagnostic    Microalbumin/creatinine urine ratio       We will call the patient with results & make further recommendations at that time.      "This note will not be shared with the patient."  "

## 2020-01-07 NOTE — PROGRESS NOTES
Patient was given Pneumococcal 23 IM in Left Deltoid and Influenza IM in Right Deltoid as per orders from MD. Aseptic tech used and pt tolerated well. Pt was monitored for 15 mins with no reaction noted.

## 2020-01-08 LAB
PROSTATE SPECIFIC ANTIGEN, TOTAL: 5.9 NG/ML (ref 0–4)
PSA FREE MFR SERPL: 28.47 %
PSA FREE SERPL-MCNC: 1.68 NG/ML (ref 0.01–1.5)

## 2020-01-15 ENCOUNTER — PATIENT MESSAGE (OUTPATIENT)
Dept: FAMILY MEDICINE | Facility: CLINIC | Age: 65
End: 2020-01-15

## 2020-01-15 ENCOUNTER — HOSPITAL ENCOUNTER (OUTPATIENT)
Dept: RADIOLOGY | Facility: OTHER | Age: 65
Discharge: HOME | End: 2020-01-15
Attending: FAMILY MEDICINE
Payer: COMMERCIAL

## 2020-01-15 DIAGNOSIS — R91.1 PULMONARY NODULE: ICD-10-CM

## 2020-01-15 PROCEDURE — 71250 CT THORAX DX C-: CPT | Mod: TC

## 2020-01-15 PROCEDURE — 71250 CT CHEST WITHOUT CONTRAST: ICD-10-PCS | Mod: 26,,, | Performed by: RADIOLOGY

## 2020-01-15 PROCEDURE — 71250 CT THORAX DX C-: CPT | Mod: 26,,, | Performed by: RADIOLOGY

## 2020-01-17 ENCOUNTER — PATIENT MESSAGE (OUTPATIENT)
Dept: FAMILY MEDICINE | Facility: CLINIC | Age: 65
End: 2020-01-17

## 2020-01-23 ENCOUNTER — PATIENT MESSAGE (OUTPATIENT)
Dept: FAMILY MEDICINE | Facility: CLINIC | Age: 65
End: 2020-01-23

## 2020-01-23 DIAGNOSIS — E78.00 HYPERCHOLESTEREMIA: ICD-10-CM

## 2020-01-23 DIAGNOSIS — Z76.0 ENCOUNTER FOR MEDICATION REFILL: ICD-10-CM

## 2020-01-23 RX ORDER — ATORVASTATIN CALCIUM 40 MG/1
40 TABLET, FILM COATED ORAL DAILY
Qty: 90 TABLET | Refills: 1 | Status: SHIPPED | OUTPATIENT
Start: 2020-01-23 | End: 2020-08-19 | Stop reason: SDUPTHER

## 2020-01-23 RX ORDER — AMLODIPINE BESYLATE 5 MG/1
5 TABLET ORAL DAILY
Qty: 90 TABLET | Refills: 1 | Status: SHIPPED | OUTPATIENT
Start: 2020-01-23 | End: 2020-08-19 | Stop reason: SDUPTHER

## 2020-08-15 NOTE — PROGRESS NOTES
"Subjective:       Patient ID: Aaron Guerra is a 65 y.o. male.  After his last visit with me in January, I recommended follow-up with Dr. Weathers (his urologist) for his rising PSA.  He did not do so.    Chief Complaint: Follow-up    Knee Pain   There was no injury mechanism. The pain is present in the left knee. The quality of the pain is described as aching. The pain is mild. Pertinent negatives include no inability to bear weight, loss of motion, loss of sensation, muscle weakness, numbness or tingling. The symptoms are aggravated by weight bearing and movement. He has tried immobilization for the symptoms. The treatment provided mild relief.     He states that knee arthroscopy/meniscectomy has been recommended in the past.      Patient Active Problem List   Diagnosis    Hypertension    Colon polyp    Allergic rhinitis    Benign prostatic hyperplasia with lower urinary tract symptoms    Hypercholesteremia    ANGELIC (obstructive sleep apnea)    Conway cardiac risk 10-20% in next 10 years    Primary osteoarthritis of left shoulder    DJD (degenerative joint disease), cervical    Elevated PSA    Beta thalassemia minor    Pulmonary nodule         Current Outpatient Medications:     NONE    The following portions of the patient's history were reviewed and updated as appropriate: allergies, past family history, past medical history, past social history and past surgical history.    Review of Systems   Neurological: Negative for tingling and numbness.       Objective:      BP (!) 170/100   Pulse 71   Ht 5' 6" (1.676 m)   Wt 77.6 kg (171 lb)   SpO2 96%   BMI 27.60 kg/m²     Physical Exam  Vitals signs reviewed.   Musculoskeletal:      Left knee: He exhibits abnormal meniscus (positive Indio test c/w medial meniscal injury). He exhibits no LCL laxity, no bony tenderness and no MCL laxity. No tenderness found.      Comments: Negative Lachmann/drawer tests.   Skin:     General: Skin is warm and dry. " "  Neurological:      Mental Status: He is alert and oriented to person, place, and time.   Psychiatric:         Mood and Affect: Mood normal.         Assessment:       1. Chronic pain of left knee    2. Elevated PSA    3. Benign prostatic hyperplasia with nocturia    4. Hypercholesteremia    5. Encounter for medication refill        Plan:       Discussed HIV screening.  Free/total PSA today.    Long discussion re: compliance with medications.  Has not taken any medications x 5 months.  Medications refilled again.    MRI left knee    RTC 1 month for BP check, and 3 months for additional,labs.      "This note will not be shared with the patient."    "

## 2020-08-19 ENCOUNTER — OFFICE VISIT (OUTPATIENT)
Dept: FAMILY MEDICINE | Facility: CLINIC | Age: 65
End: 2020-08-19
Attending: FAMILY MEDICINE
Payer: COMMERCIAL

## 2020-08-19 ENCOUNTER — LAB VISIT (OUTPATIENT)
Dept: LAB | Facility: HOSPITAL | Age: 65
End: 2020-08-19
Attending: FAMILY MEDICINE
Payer: COMMERCIAL

## 2020-08-19 VITALS
BODY MASS INDEX: 27.48 KG/M2 | HEART RATE: 71 BPM | OXYGEN SATURATION: 96 % | WEIGHT: 171 LBS | HEIGHT: 66 IN | SYSTOLIC BLOOD PRESSURE: 170 MMHG | DIASTOLIC BLOOD PRESSURE: 100 MMHG

## 2020-08-19 DIAGNOSIS — M25.569 KNEE PAIN, UNSPECIFIED CHRONICITY, UNSPECIFIED LATERALITY: ICD-10-CM

## 2020-08-19 DIAGNOSIS — R35.1 BENIGN PROSTATIC HYPERPLASIA WITH NOCTURIA: ICD-10-CM

## 2020-08-19 DIAGNOSIS — R97.20 ELEVATED PSA: ICD-10-CM

## 2020-08-19 DIAGNOSIS — E78.00 HYPERCHOLESTEREMIA: ICD-10-CM

## 2020-08-19 DIAGNOSIS — N40.1 BENIGN PROSTATIC HYPERPLASIA WITH NOCTURIA: ICD-10-CM

## 2020-08-19 DIAGNOSIS — G89.29 CHRONIC PAIN OF LEFT KNEE: Primary | ICD-10-CM

## 2020-08-19 DIAGNOSIS — Z76.0 ENCOUNTER FOR MEDICATION REFILL: ICD-10-CM

## 2020-08-19 DIAGNOSIS — M25.562 CHRONIC PAIN OF LEFT KNEE: Primary | ICD-10-CM

## 2020-08-19 LAB
PROSTATE SPECIFIC ANTIGEN, TOTAL: 5.4 NG/ML (ref 0–4)
PSA FREE MFR SERPL: 28.52 %
PSA FREE SERPL-MCNC: 1.54 NG/ML (ref 0.01–1.5)

## 2020-08-19 PROCEDURE — 3008F PR BODY MASS INDEX (BMI) DOCUMENTED: ICD-10-PCS | Mod: CPTII,S$GLB,, | Performed by: FAMILY MEDICINE

## 2020-08-19 PROCEDURE — 3080F PR MOST RECENT DIASTOLIC BLOOD PRESSURE >= 90 MM HG: ICD-10-PCS | Mod: CPTII,S$GLB,, | Performed by: FAMILY MEDICINE

## 2020-08-19 PROCEDURE — 99999 PR PBB SHADOW E&M-EST. PATIENT-LVL III: ICD-10-PCS | Mod: PBBFAC,,, | Performed by: FAMILY MEDICINE

## 2020-08-19 PROCEDURE — 99214 OFFICE O/P EST MOD 30 MIN: CPT | Mod: S$GLB,,, | Performed by: FAMILY MEDICINE

## 2020-08-19 PROCEDURE — 84154 ASSAY OF PSA FREE: CPT

## 2020-08-19 PROCEDURE — 36415 COLL VENOUS BLD VENIPUNCTURE: CPT | Mod: PO

## 2020-08-19 PROCEDURE — 3077F SYST BP >= 140 MM HG: CPT | Mod: CPTII,S$GLB,, | Performed by: FAMILY MEDICINE

## 2020-08-19 PROCEDURE — 3008F BODY MASS INDEX DOCD: CPT | Mod: CPTII,S$GLB,, | Performed by: FAMILY MEDICINE

## 2020-08-19 PROCEDURE — 99999 PR PBB SHADOW E&M-EST. PATIENT-LVL III: CPT | Mod: PBBFAC,,, | Performed by: FAMILY MEDICINE

## 2020-08-19 PROCEDURE — 3077F PR MOST RECENT SYSTOLIC BLOOD PRESSURE >= 140 MM HG: ICD-10-PCS | Mod: CPTII,S$GLB,, | Performed by: FAMILY MEDICINE

## 2020-08-19 PROCEDURE — 3080F DIAST BP >= 90 MM HG: CPT | Mod: CPTII,S$GLB,, | Performed by: FAMILY MEDICINE

## 2020-08-19 PROCEDURE — 99214 PR OFFICE/OUTPT VISIT, EST, LEVL IV, 30-39 MIN: ICD-10-PCS | Mod: S$GLB,,, | Performed by: FAMILY MEDICINE

## 2020-08-19 RX ORDER — AMLODIPINE BESYLATE 5 MG/1
5 TABLET ORAL DAILY
Qty: 30 TABLET | Refills: 2 | Status: SHIPPED | OUTPATIENT
Start: 2020-08-19 | End: 2021-03-22

## 2020-08-19 RX ORDER — ATORVASTATIN CALCIUM 40 MG/1
40 TABLET, FILM COATED ORAL DAILY
Qty: 30 TABLET | Refills: 2 | Status: SHIPPED | OUTPATIENT
Start: 2020-08-19 | End: 2021-03-22

## 2020-08-21 ENCOUNTER — PATIENT MESSAGE (OUTPATIENT)
Dept: FAMILY MEDICINE | Facility: CLINIC | Age: 65
End: 2020-08-21

## 2020-08-24 ENCOUNTER — PATIENT MESSAGE (OUTPATIENT)
Dept: FAMILY MEDICINE | Facility: CLINIC | Age: 65
End: 2020-08-24

## 2020-08-27 ENCOUNTER — PATIENT MESSAGE (OUTPATIENT)
Dept: FAMILY MEDICINE | Facility: CLINIC | Age: 65
End: 2020-08-27

## 2020-08-27 ENCOUNTER — HOSPITAL ENCOUNTER (OUTPATIENT)
Dept: RADIOLOGY | Facility: OTHER | Age: 65
Discharge: HOME | End: 2020-08-27
Attending: FAMILY MEDICINE
Payer: COMMERCIAL

## 2020-08-27 DIAGNOSIS — G89.29 CHRONIC PAIN OF LEFT KNEE: ICD-10-CM

## 2020-08-27 DIAGNOSIS — M25.562 CHRONIC PAIN OF LEFT KNEE: ICD-10-CM

## 2020-08-27 DIAGNOSIS — M23.204 DEGENERATIVE TEAR OF MEDIAL MENISCUS OF LEFT KNEE: Primary | ICD-10-CM

## 2020-08-27 PROCEDURE — 73721 MRI JNT OF LWR EXTRE W/O DYE: CPT | Mod: TC,LT

## 2020-08-27 PROCEDURE — 73721 MRI KNEE WITHOUT CONTRAST LEFT: ICD-10-PCS | Mod: 26,LT,, | Performed by: RADIOLOGY

## 2020-08-27 PROCEDURE — 73721 MRI JNT OF LWR EXTRE W/O DYE: CPT | Mod: 26,LT,, | Performed by: RADIOLOGY

## 2020-08-31 ENCOUNTER — OFFICE VISIT (OUTPATIENT)
Dept: SPORTS MEDICINE | Facility: CLINIC | Age: 65
End: 2020-08-31
Payer: COMMERCIAL

## 2020-08-31 ENCOUNTER — HOSPITAL ENCOUNTER (OUTPATIENT)
Dept: RADIOLOGY | Facility: HOSPITAL | Age: 65
Discharge: HOME | End: 2020-08-31
Attending: PHYSICIAN ASSISTANT
Payer: COMMERCIAL

## 2020-08-31 VITALS
HEART RATE: 83 BPM | SYSTOLIC BLOOD PRESSURE: 153 MMHG | BODY MASS INDEX: 27.64 KG/M2 | HEIGHT: 66 IN | WEIGHT: 172 LBS | DIASTOLIC BLOOD PRESSURE: 96 MMHG

## 2020-08-31 DIAGNOSIS — M23.204 DEGENERATIVE TEAR OF MEDIAL MENISCUS OF LEFT KNEE: ICD-10-CM

## 2020-08-31 DIAGNOSIS — G89.29 CHRONIC PAIN OF LEFT KNEE: Primary | ICD-10-CM

## 2020-08-31 DIAGNOSIS — M17.0 OSTEOARTHRITIS OF BOTH KNEES, UNSPECIFIED OSTEOARTHRITIS TYPE: ICD-10-CM

## 2020-08-31 DIAGNOSIS — M25.562 PAIN IN BOTH KNEES, UNSPECIFIED CHRONICITY: ICD-10-CM

## 2020-08-31 DIAGNOSIS — M25.561 PAIN IN BOTH KNEES, UNSPECIFIED CHRONICITY: ICD-10-CM

## 2020-08-31 DIAGNOSIS — M25.562 CHRONIC PAIN OF LEFT KNEE: Primary | ICD-10-CM

## 2020-08-31 DIAGNOSIS — M25.561 ACUTE PAIN OF RIGHT KNEE: ICD-10-CM

## 2020-08-31 PROCEDURE — 3080F PR MOST RECENT DIASTOLIC BLOOD PRESSURE >= 90 MM HG: ICD-10-PCS | Mod: CPTII,S$GLB,, | Performed by: PHYSICIAN ASSISTANT

## 2020-08-31 PROCEDURE — 99999 PR PBB SHADOW E&M-EST. PATIENT-LVL IV: CPT | Mod: PBBFAC,,, | Performed by: PHYSICIAN ASSISTANT

## 2020-08-31 PROCEDURE — 20610 PR DRAIN/INJECT LARGE JOINT/BURSA: ICD-10-PCS | Mod: RT,S$GLB,, | Performed by: PHYSICIAN ASSISTANT

## 2020-08-31 PROCEDURE — 73564 X-RAY EXAM KNEE 4 OR MORE: CPT | Mod: TC,50

## 2020-08-31 PROCEDURE — 3008F BODY MASS INDEX DOCD: CPT | Mod: CPTII,S$GLB,, | Performed by: PHYSICIAN ASSISTANT

## 2020-08-31 PROCEDURE — 73564 XR KNEE ORTHO BILAT WITH FLEXION: ICD-10-PCS | Mod: 26,,, | Performed by: RADIOLOGY

## 2020-08-31 PROCEDURE — 20610 DRAIN/INJ JOINT/BURSA W/O US: CPT | Mod: RT,S$GLB,, | Performed by: PHYSICIAN ASSISTANT

## 2020-08-31 PROCEDURE — 99203 PR OFFICE/OUTPT VISIT, NEW, LEVL III, 30-44 MIN: ICD-10-PCS | Mod: 25,S$GLB,, | Performed by: PHYSICIAN ASSISTANT

## 2020-08-31 PROCEDURE — 99203 OFFICE O/P NEW LOW 30 MIN: CPT | Mod: 25,S$GLB,, | Performed by: PHYSICIAN ASSISTANT

## 2020-08-31 PROCEDURE — 3077F SYST BP >= 140 MM HG: CPT | Mod: CPTII,S$GLB,, | Performed by: PHYSICIAN ASSISTANT

## 2020-08-31 PROCEDURE — 73564 X-RAY EXAM KNEE 4 OR MORE: CPT | Mod: 26,,, | Performed by: RADIOLOGY

## 2020-08-31 PROCEDURE — 99999 PR PBB SHADOW E&M-EST. PATIENT-LVL IV: ICD-10-PCS | Mod: PBBFAC,,, | Performed by: PHYSICIAN ASSISTANT

## 2020-08-31 PROCEDURE — 3080F DIAST BP >= 90 MM HG: CPT | Mod: CPTII,S$GLB,, | Performed by: PHYSICIAN ASSISTANT

## 2020-08-31 PROCEDURE — 3008F PR BODY MASS INDEX (BMI) DOCUMENTED: ICD-10-PCS | Mod: CPTII,S$GLB,, | Performed by: PHYSICIAN ASSISTANT

## 2020-08-31 PROCEDURE — 3077F PR MOST RECENT SYSTOLIC BLOOD PRESSURE >= 140 MM HG: ICD-10-PCS | Mod: CPTII,S$GLB,, | Performed by: PHYSICIAN ASSISTANT

## 2020-08-31 RX ADMIN — LIDOCAINE HYDROCHLORIDE 2 ML: 10 INJECTION INFILTRATION; PERINEURAL at 02:08

## 2020-08-31 RX ADMIN — TRIAMCINOLONE ACETONIDE 40 MG: 40 INJECTION, SUSPENSION INTRA-ARTICULAR; INTRAMUSCULAR at 02:08

## 2020-08-31 RX ADMIN — BUPIVACAINE HYDROCHLORIDE 5 MG: 2.5 INJECTION, SOLUTION INFILTRATION; PERINEURAL at 02:08

## 2020-08-31 NOTE — LETTER
September 1, 2020      Lincoln Villalta Jr., MD  [address]  [address]  [facility] [address]           [facility]  [address]  [facility] [address]  Phone: [phone]          Patient: Aaron Guerra   MR Number: [id number]   YOB: 1955   Date of Visit: 8/31/2020       Dear Dr. Lincoln Villalta Jr.:    Thank you for referring Aaron Guerra to me for evaluation. Attached you will find relevant portions of my assessment and plan of care.    If you have questions, please do not hesitate to call me. I look forward to following Aaron Guerra along with you.    Sincerely,    Alvin Auguste III, PA-C    Enclosure  CC:  No Recipients    If you would like to receive this communication electronically, please contact [email or web address] or [phone] to request more information on [employer] access.    For providers and/or their staff who would like to refer a patient to [facility], please contact us through our one-stop-shop provider referral line, [facility], at [phone].    If you feel you have received this communication in error or would no longer like to receive these types of communications, please e-mail [email or web address]

## 2020-09-01 ENCOUNTER — TELEPHONE (OUTPATIENT)
Dept: FAMILY MEDICINE | Facility: CLINIC | Age: 65
End: 2020-09-01

## 2020-09-01 ENCOUNTER — TELEPHONE (OUTPATIENT)
Dept: SPORTS MEDICINE | Facility: CLINIC | Age: 65
End: 2020-09-01

## 2020-09-01 DIAGNOSIS — M94.262 CHONDROMALACIA OF LEFT KNEE: ICD-10-CM

## 2020-09-01 DIAGNOSIS — M23.201 OLD TEAR OF LATERAL MENISCUS OF LEFT KNEE, UNSPECIFIED TEAR TYPE: ICD-10-CM

## 2020-09-01 DIAGNOSIS — M23.204 OLD BUCKET HANDLE TEAR OF MEDIAL MENISCUS OF LEFT KNEE: Primary | ICD-10-CM

## 2020-09-01 DIAGNOSIS — M67.50 PLICA SYNDROME: ICD-10-CM

## 2020-09-01 DIAGNOSIS — Z01.818 PRE-OP TESTING: Primary | ICD-10-CM

## 2020-09-01 DIAGNOSIS — M17.12 OSTEOARTHRITIS OF LEFT KNEE, UNSPECIFIED OSTEOARTHRITIS TYPE: ICD-10-CM

## 2020-09-01 RX ORDER — TRIAMCINOLONE ACETONIDE 40 MG/ML
40 INJECTION, SUSPENSION INTRA-ARTICULAR; INTRAMUSCULAR
Status: COMPLETED | OUTPATIENT
Start: 2020-08-31 | End: 2020-08-31

## 2020-09-01 RX ORDER — BUPIVACAINE HYDROCHLORIDE 2.5 MG/ML
2 INJECTION, SOLUTION INFILTRATION; PERINEURAL
Status: COMPLETED | OUTPATIENT
Start: 2020-08-31 | End: 2020-08-31

## 2020-09-01 RX ORDER — LIDOCAINE HYDROCHLORIDE 10 MG/ML
2 INJECTION INFILTRATION; PERINEURAL
Status: COMPLETED | OUTPATIENT
Start: 2020-08-31 | End: 2020-08-31

## 2020-09-01 NOTE — TELEPHONE ENCOUNTER
Spoke with patient to inform him that Dr. Villalta would like for him to have medical clearance with him. Patient states the only day he can come in for an appt is Friday 09/04/2020. Patient was informed that Dr. Villalta is out of the office every Friday and Friday would be cutting it close to his surgery date.     Patient states he will push back the surgery to 09/15/2020. Patient is schedule for pre-op appt with Dr. Villalta on 09/09/2020.
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
## 2020-09-01 NOTE — TELEPHONE ENCOUNTER
Spoke with the patient in regards to his phone call. Patient states Dr. Rojo sent a message to Dr. Villalta in regards to his upcoming surgery. Patient would like to know if he needs to have a pre-op exam with Dr. Villalta first. Patient states his surgery is on 09/08/2020.

## 2020-09-01 NOTE — TELEPHONE ENCOUNTER
----- Message from Natalia Dallas sent at 9/1/2020 10:40 AM CDT -----  Regarding: Patient call back  Who called:KEITH DIAMOND [[id number]]    What is the request in detail: Patient is requesting a call back. He states he would like to inform the staff that he is is getting a procedure done 9/8. He would like to know if he needs to come in before the procedure.   Please advise.    Can the clinic reply by [facility]? No    Best call back number: [phone]    Additional Information: N/A

## 2020-09-02 ENCOUNTER — TELEPHONE (OUTPATIENT)
Dept: SPORTS MEDICINE | Facility: CLINIC | Age: 65
End: 2020-09-02

## 2020-09-02 NOTE — TELEPHONE ENCOUNTER
----- Message from Yodit Grant sent at 9/2/2020  9:28 AM CDT -----  Contact: pt  Please call pt at [phone]    Patient has questions regarding his future surgery and need to reschedule some surgery related appts    Thank you

## 2020-09-06 ENCOUNTER — PATIENT OUTREACH (OUTPATIENT)
Dept: ADMINISTRATIVE | Facility: OTHER | Age: 65
End: 2020-09-06

## 2020-09-08 PROBLEM — M23.261 OLD BUCKET HANDLE TEAR OF LATERAL MENISCUS OF RIGHT KNEE: Status: ACTIVE | Noted: 2020-09-08

## 2020-09-08 PROBLEM — M23.203 OLD BUCKET HANDLE TEAR OF MEDIAL MENISCUS OF RIGHT KNEE: Status: ACTIVE | Noted: 2020-09-08

## 2020-09-08 NOTE — PROGRESS NOTES
Subjective:     Aaorn Guerra is a 65 y.o. male who presents to the office today for a preoperative consultation at the request of surgeon Dr. Whitley Rojo who plans on performing left knee arthroscopy w/meniscectomy (and possible synovectomy and chondroplasty) on September 15. This consultation is requested for the specific conditions prompting preoperative evaluation (i.e. because of potential affect on operative risk): medical clearance. Planned anesthesia: general. The patient has the following known anesthesia issues: none. Patients bleeding risk: no recent abnormal bleeding, no remote history of abnormal bleeding and use of Ca-channel blockers (see med list). Patient does not have objections to receiving blood products if needed.    Patient Active Problem List   Diagnosis    Hypertension    Colon polyp    Allergic rhinitis    Benign prostatic hyperplasia with lower urinary tract symptoms    Hypercholesteremia    ANGELIC (obstructive sleep apnea)    [place] cardiac risk 10-20% in next 10 years    Primary osteoarthritis of left shoulder    DJD (degenerative joint disease), cervical    Elevated PSA    Beta thalassemia minor    Pulmonary nodule    Old bucket handle tear of lateral meniscus of right knee    Old bucket handle tear of medial meniscus of right knee       Past Surgical History:   Procedure Laterality Date    COLONOSCOPY N/A 12/1/2015    Procedure: COLONOSCOPY;  Surgeon: Meagan Hill MD;  Location: [address]);  Service: Endoscopy;  Laterality: N/A;    CYSTOSCOPY WITH INSERTION OF MINIMALLY INVASIVE IMPLANT TO ENLARGE PROSTATIC URETHRA N/A 8/15/2018    Procedure: TRANSPROSTATIC TISSUE RETRACTION;  Surgeon: Trenton Weathers MD;  Location: [facility];  Service: Urology;  Laterality: N/A;    TONSILLECTOMY           Current Outpatient Medications:     amLODIPine (NORVASC) 5 MG tablet, Take 1 tablet (5 mg total) by mouth once daily., Disp: 30 tablet, Rfl: 2    ascorbate calcium  (VITAMIN C ORAL), Take 1 tablet by mouth once daily., Disp: , Rfl:     aspirin (ECOTRIN) 81 MG EC tablet, Take 81 mg by mouth once daily., Disp: , Rfl:     atorvastatin (LIPITOR) 40 MG tablet, Take 1 tablet (40 mg total) by mouth once daily., Disp: 30 tablet, Rfl: 2    vitamin E acetate (VITAMIN E ORAL), Take 1 tablet by mouth once daily., Disp: , Rfl:     Review of patient's allergies indicates:  No Known Allergies    Family History   Problem Relation Age of Onset    Cataracts Mother     Diabetes Mother     Hypertension Mother     Stroke Mother     Heart attack Sister     Drug abuse Brother     Hypertension Brother     Diabetes Sister     Hypertension Brother     Hypertension Sister        Social History     Socioeconomic History    Marital status:      Spouse name: Not on file    Number of children: 2    Years of education: Not on file    Highest education level: Not on file   Occupational History    Occupation: sales   Social Needs    Financial resource strain: Not hard at all    Food insecurity     Worry: Never true     Inability: Never true    Transportation needs     Medical: No     Non-medical: No   Tobacco Use    Smoking status: Never Smoker    Smokeless tobacco: Never Used   Substance and Sexual Activity    Alcohol use: No    Drug use: No    Sexual activity: Yes     Partners: Female   Lifestyle    Physical activity     Days per week: 0 days     Minutes per session: Not on file    Stress: Very much   Relationships    Social connections     Talks on phone: More than three times a week     Gets together: Once a week     Attends Faith service: More than 4 times per year     Active member of club or organization: Yes     Attends meetings of clubs or organizations: More than 4 times per year     Relationship status:    Other Topics Concern    Not on file   Social History Narrative    The patient does not exercise regularly ().      He is not satisfied with weight.  "   Rates diet as good.    He does not drink at least 1/2 gallon water daily.    He drinks 2 coffee/tea/caffeine-containing soft drinks daily.    Total sleep time at night is 4-5 hours.    He does wear seat belts.    Hobbies include none.               Patient Care Team:  Lincoln Villalta Jr., MD as PCP - General (Family Medicine)  Faith Beaver MD as Consulting Physician (Urology)  James Zavala MD as Consulting Physician (Orthopedic Surgery)  Trenton Weathers MD as Consulting Physician (Urology)  Tiffany Velarde NP as Nurse Practitioner (Sleep Medicine)  Shanelle Alanis MD as Consulting Physician (Intensive Care)  Citlaly Matamoros OD as Consulting Physician (Optometry)    Review of Systems  A comprehensive review of systems was negative except for: Musculoskeletal: positive for arthralgias      Objective:      BP (!) 142/76   Pulse 89   Ht 5' 6" (1.676 m)   Wt 86.6 kg (191 lb)   SpO2 95%   BMI 30.83 kg/m²     General Appearance:    Alert, cooperative, no distress, appears stated age   Head:    Normocephalic, without obvious abnormality, atraumatic   Eyes:    PERRL, conjunctiva/corneas clear, EOM's intact, fundi     benign, both eyes        Ears:    Normal TM's and external ear canals, both ears   Nose:   Nares normal, septum midline, mucosa normal, no drainage    or sinus tenderness   Throat:   Lips, mucosa, and tongue normal; teeth and gums normal   Neck:   Supple, symmetrical, trachea midline, no adenopathy;        thyroid:  No enlargement/tenderness/nodules; no carotid    bruit or JVD   Back:     Symmetric, no curvature, ROM normal, no CVA tenderness   Lungs:     Clear to auscultation bilaterally, respirations unlabored   Chest wall:    No tenderness or deformity   Heart:    Regular rate and rhythm, S1 and S2 normal, no murmur, rub   or gallop   Abdomen:     Soft, non-tender, bowel sounds active all four quadrants,     no masses, no organomegaly   Extremities:   Extremities normal, atraumatic, " no cyanosis or edema   Pulses:   2+ and symmetric all extremities   Skin:   Skin color, texture, turgor normal, no rashes or lesions   Lymph nodes:   Cervical, supraclavicular, and axillary nodes normal   Neurologic:   CNII-XII intact. Normal strength, sensation and reflexes       throughout       Predictors of intubation difficulty:   Morbid obesity? no   Anatomically abnormal facies? no   Prominent incisors? no   Receding mandible? no   Short, thick neck? No    Neck range of motion: abnormal (moderately limited)   Mallampati score: II (hard and soft palate, upper portion of tonsils anduvula visible)   Thyromental distance: 7 cm   Mouth openin cm   Dentition: No chipped, loose, or missing teeth.    Cardiographics  ECG 2020: normal sinus rhythm, no blocks or conduction defects, no ischemic changes    Imaging  CT chest 1/15/2020: Resolution of left lower lobe pulmonary nodule.  No new/ concerning nodules identified.    Lab Review   Results for KEITH DIAMOND (MRN [id number]) as of 2020 10:02   Ref. Range 9/10/2020 08:46   WBC Latest Ref Range: 3.90 - 12.70 K/uL 4.79   RBC Latest Ref Range: 4.60 - 6.20 M/uL 5.25   Hemoglobin Latest Ref Range: 14.0 - 18.0 g/dL 11.7 (L)   Hematocrit Latest Ref Range: 40.0 - 54.0 % 38.9 (L)   MCV Latest Ref Range: 82 - 98 fL 74 (L)   MCH Latest Ref Range: 27.0 - 31.0 pg 22.3 (L)   MCHC Latest Ref Range: 32.0 - 36.0 g/dL 30.1 (L)   RDW Latest Ref Range: 11.5 - 14.5 % 16.3 (H)   Platelets Latest Ref Range: 150 - 350 K/uL 277   MPV Latest Ref Range: 9.2 - 12.9 fL 10.1   Gran% Latest Ref Range: 38.0 - 73.0 % 42.2   Gran # (ANC) Latest Ref Range: 1.8 - 7.7 K/uL 2.0   Lymph% Latest Ref Range: 18.0 - 48.0 % 43.0   Lymph # Latest Ref Range: 1.0 - 4.8 K/uL 2.1   Mono% Latest Ref Range: 4.0 - 15.0 % 9.6   Mono # Latest Ref Range: 0.3 - 1.0 K/uL 0.5   Eosinophil% Latest Ref Range: 0.0 - 8.0 % 4.2   Eos # Latest Ref Range: 0.0 - 0.5 K/uL 0.2   Basophil% Latest Ref Range: 0.0 - 1.9 % 0.6   Baso  # Latest Ref Range: 0.00 - 0.20 K/uL 0.03   nRBC Latest Ref Range: 0 /100 WBC 0   Differential Method Unknown Automated   Immature Grans (Abs) Latest Ref Range: 0.00 - 0.04 K/uL 0.02   Immature Granulocytes Latest Ref Range: 0.0 - 0.5 % 0.4   Sodium Latest Ref Range: 136 - 145 mmol/L 138   Potassium Latest Ref Range: 3.5 - 5.1 mmol/L 3.9   Chloride Latest Ref Range: 95 - 110 mmol/L 107   CO2 Latest Ref Range: 23 - 29 mmol/L 26   Anion Gap Latest Ref Range: 8 - 16 mmol/L 5 (L)   BUN, Bld Latest Ref Range: 8 - 23 mg/dL 15   Creatinine Latest Ref Range: 0.5 - 1.4 mg/dL 1.1   eGFR if non African American Latest Ref Range: >60 mL/min/1.73 m^2 >60.0   eGFR if African American Latest Ref Range: >60 mL/min/1.73 m^2 >60.0   Glucose Latest Ref Range: 70 - 110 mg/dL 101   Calcium Latest Ref Range: 8.7 - 10.5 mg/dL 8.2 (L)   Alkaline Phosphatase Latest Ref Range: 55 - 135 U/L 89   PROTEIN TOTAL Latest Ref Range: 6.0 - 8.4 g/dL 6.9   Albumin Latest Ref Range: 3.5 - 5.2 g/dL 3.6   BILIRUBIN TOTAL Latest Ref Range: 0.1 - 1.0 mg/dL 0.4   AST Latest Ref Range: 10 - 40 U/L 23   ALT Latest Ref Range: 10 - 44 U/L 27   Triglycerides Latest Ref Range: 30 - 150 mg/dL 76   Cholesterol Latest Ref Range: 120 - 199 mg/dL 159   HDL Latest Ref Range: 40 - 75 mg/dL 45   Hdl/Cholesterol Ratio Latest Ref Range: 20.0 - 50.0 % 28.3   LDL Cholesterol External Latest Ref Range: 63.0 - 159.0 mg/dL 98.8   Non-HDL Cholesterol Latest Units: mg/dL 114   Total Cholesterol/HDL Ratio Latest Ref Range: 2.0 - 5.0  3.5        Assessment:       1. Preop examination    2. Old bucket handle tear of lateral meniscus of right knee    3. Old bucket handle tear of medial meniscus of right knee    4. Hypertension, unspecified type    5. Hypercholesteremia    6. [place] cardiac risk 10-20% in next 10 years    7. ANGELIC (obstructive sleep apnea)    8. Pulmonary nodule    9. Beta thalassemia minor        65 y.o. male with planned surgery as above.    Known risk factors for  perioperative complications: None    Difficulty with intubation is not anticipated.    Cardiac Risk Estimation: 0.15%  Current medications which may produce withdrawal symptoms if withheld perioperatively: none      Plan:      1. Preoperative workup as follows none.  2. Change in medication regimen before surgery: discontinue ASA 14 days before surgery and discontinue NSAIDs 14 days before surgery.  3. Prophylaxis for cardiac events with perioperative beta-blockers: not indicated.  4. Invasive hemodynamic monitoring perioperatively: not indicated.  5. Deep vein thrombosis prophylaxis postoperatively:regimen to be chosen by surgical team.  6. Surveillance for postoperative MI with ECG immediately postoperatively and on postoperative days 1 and 2 AND troponin levels 24 hours postoperatively and on day 4 or hospital discharge (whichever comes first): not indicated.    The patient is cleared for surgery.        Lincoln Villalta Jr., MD

## 2020-09-09 ENCOUNTER — OFFICE VISIT (OUTPATIENT)
Dept: SPORTS MEDICINE | Facility: CLINIC | Age: 65
End: 2020-09-09
Payer: COMMERCIAL

## 2020-09-09 ENCOUNTER — OFFICE VISIT (OUTPATIENT)
Dept: FAMILY MEDICINE | Facility: CLINIC | Age: 65
End: 2020-09-09
Attending: FAMILY MEDICINE
Payer: COMMERCIAL

## 2020-09-09 VITALS
SYSTOLIC BLOOD PRESSURE: 142 MMHG | HEART RATE: 89 BPM | HEIGHT: 66 IN | WEIGHT: 191 LBS | BODY MASS INDEX: 30.7 KG/M2 | OXYGEN SATURATION: 95 % | DIASTOLIC BLOOD PRESSURE: 76 MMHG

## 2020-09-09 VITALS
SYSTOLIC BLOOD PRESSURE: 142 MMHG | WEIGHT: 191 LBS | HEART RATE: 82 BPM | DIASTOLIC BLOOD PRESSURE: 85 MMHG | BODY MASS INDEX: 30.7 KG/M2 | HEIGHT: 66 IN

## 2020-09-09 DIAGNOSIS — G47.33 OSA (OBSTRUCTIVE SLEEP APNEA): ICD-10-CM

## 2020-09-09 DIAGNOSIS — E78.00 HYPERCHOLESTEREMIA: ICD-10-CM

## 2020-09-09 DIAGNOSIS — R91.1 PULMONARY NODULE: ICD-10-CM

## 2020-09-09 DIAGNOSIS — G89.18 POST-OPERATIVE PAIN: ICD-10-CM

## 2020-09-09 DIAGNOSIS — M23.261 OLD BUCKET HANDLE TEAR OF LATERAL MENISCUS OF RIGHT KNEE: ICD-10-CM

## 2020-09-09 DIAGNOSIS — M23.201 OLD TEAR OF LATERAL MENISCUS OF LEFT KNEE, UNSPECIFIED TEAR TYPE: ICD-10-CM

## 2020-09-09 DIAGNOSIS — M23.203 OLD BUCKET HANDLE TEAR OF MEDIAL MENISCUS OF RIGHT KNEE: ICD-10-CM

## 2020-09-09 DIAGNOSIS — I10 HYPERTENSION, UNSPECIFIED TYPE: ICD-10-CM

## 2020-09-09 DIAGNOSIS — D56.3 BETA THALASSEMIA MINOR: ICD-10-CM

## 2020-09-09 DIAGNOSIS — Z01.818 PREOP EXAMINATION: Primary | ICD-10-CM

## 2020-09-09 DIAGNOSIS — Z91.89 FRAMINGHAM CARDIAC RISK 10-20% IN NEXT 10 YEARS: ICD-10-CM

## 2020-09-09 DIAGNOSIS — M23.204 OLD BUCKET HANDLE TEAR OF MEDIAL MENISCUS OF LEFT KNEE: Primary | ICD-10-CM

## 2020-09-09 PROCEDURE — 93010 ELECTROCARDIOGRAM REPORT: CPT | Mod: S$GLB,,, | Performed by: INTERNAL MEDICINE

## 2020-09-09 PROCEDURE — 93010 EKG 12-LEAD: ICD-10-PCS | Mod: S$GLB,,, | Performed by: INTERNAL MEDICINE

## 2020-09-09 PROCEDURE — 93005 EKG 12-LEAD: ICD-10-PCS | Mod: S$GLB,,, | Performed by: FAMILY MEDICINE

## 2020-09-09 PROCEDURE — 99244 PR OFFICE CONSULTATION,LEVEL IV: ICD-10-PCS | Mod: S$GLB,,, | Performed by: FAMILY MEDICINE

## 2020-09-09 PROCEDURE — 99999 PR PBB SHADOW E&M-EST. PATIENT-LVL III: CPT | Mod: PBBFAC,,, | Performed by: PHYSICIAN ASSISTANT

## 2020-09-09 PROCEDURE — 99499 UNLISTED E&M SERVICE: CPT | Mod: S$GLB,,, | Performed by: PHYSICIAN ASSISTANT

## 2020-09-09 PROCEDURE — 93005 ELECTROCARDIOGRAM TRACING: CPT | Mod: S$GLB,,, | Performed by: FAMILY MEDICINE

## 2020-09-09 PROCEDURE — 99999 PR PBB SHADOW E&M-EST. PATIENT-LVL III: ICD-10-PCS | Mod: PBBFAC,,, | Performed by: FAMILY MEDICINE

## 2020-09-09 PROCEDURE — 99499 NO LOS: ICD-10-PCS | Mod: S$GLB,,, | Performed by: PHYSICIAN ASSISTANT

## 2020-09-09 PROCEDURE — 99244 OFF/OP CNSLTJ NEW/EST MOD 40: CPT | Mod: S$GLB,,, | Performed by: FAMILY MEDICINE

## 2020-09-09 PROCEDURE — 99999 PR PBB SHADOW E&M-EST. PATIENT-LVL III: CPT | Mod: PBBFAC,,, | Performed by: FAMILY MEDICINE

## 2020-09-09 PROCEDURE — 99999 PR PBB SHADOW E&M-EST. PATIENT-LVL III: ICD-10-PCS | Mod: PBBFAC,,, | Performed by: PHYSICIAN ASSISTANT

## 2020-09-09 RX ORDER — KETOCONAZOLE 20 MG/ML
SHAMPOO, SUSPENSION TOPICAL
Qty: 120 ML | Refills: 1 | Status: SHIPPED | OUTPATIENT
Start: 2020-09-10 | End: 2023-07-18 | Stop reason: SDUPTHER

## 2020-09-09 NOTE — ANESTHESIA PAT ROS NOTE
09/09/2020  Aaron Guerra is a 65 y.o., male.      Pre-op Assessment    I have reviewed the Patient Summary Reports.       I have reviewed the Medications.     Review of Systems  Anesthesia Hx:  Airway/Jaw/Neck: 8/18/2018  Airway Findings: Mouth Opening: Normal Tongue: Normal  General Airway Assessment: Adult  Mallampati: III  Jaw/Neck Findings:  Neck ROM: Normal ROM      Social:  Non-Smoker, No Alcohol Use    Hematology/Oncology:        Oncology Comments: Beta thalassemia minor   EENT/Dental:   chronic allergic rhinitis   Cardiovascular:   Hypertension  Denies Angina. hyperlipidemia    Pulmonary:   Denies Shortness of breath.  Denies Recent URI. Sleep Apnea Pulmonary nodule   Renal/:   BPH    Musculoskeletal:   Arthritis     Neurological:  Neurology Normal    Endocrine:  Endocrine Normal    Dermatological:  Skin Normal    Psych:  Psychiatric Normal              Anesthesia Assessment: Preoperative EQUATION    Planned Procedure: Procedure(s) (LRB):  ARTHROSCOPY, KNEE, WITH MENISCECTOMY (Left)  SYNOVECTOMY, KNEE (Left)  CHONDROPLASTY, KNEE (Left)  Requested Anesthesia Type:General  Surgeon: Whitley Rojo MD  Service: Orthopedics  Known or anticipated Date of Surgery:9/15/2020    Surgeon notes: reviewed    Electronic QUestionnaire Assessment completed via nurse interview with patient.        Triage considerations:     The patient has no apparent active cardiac condition (No unstable coronary Syndrome such as severe unstable angina or recent [<1 month] myocardial infarction, decompensated CHF, severe valvular   disease or significant arrhythmia)    Previous anesthesia records:MAC and No problems    Last PCP note: within 1 month , within [facility]   Subspecialty notes: Pulmonary, Urology, sleep medicine    Other important co-morbidities: HLD, HTN, ANGELIC and BPH      Tests already available:  CMP PSA 1/2020              Instructions given. (See in Nurse's note)    Optimization:  Anesthesia Preop Clinic Assessment  Indicated    Medical Opinion Indicated 9/9           Plan:        Patient  has previously scheduled Medical Appointment:    Navigation: Tests completed             Results will be tracked by Preop Clinic.

## 2020-09-10 ENCOUNTER — LAB VISIT (OUTPATIENT)
Dept: LAB | Facility: HOSPITAL | Age: 65
End: 2020-09-10
Attending: FAMILY MEDICINE
Payer: COMMERCIAL

## 2020-09-10 DIAGNOSIS — E78.00 HYPERCHOLESTEREMIA: ICD-10-CM

## 2020-09-10 DIAGNOSIS — Z91.89 FRAMINGHAM CARDIAC RISK 10-20% IN NEXT 10 YEARS: ICD-10-CM

## 2020-09-10 DIAGNOSIS — Z01.818 PREOP EXAMINATION: ICD-10-CM

## 2020-09-10 DIAGNOSIS — I10 HYPERTENSION, UNSPECIFIED TYPE: ICD-10-CM

## 2020-09-10 LAB
ALBUMIN SERPL BCP-MCNC: 3.6 G/DL (ref 3.5–5.2)
ALP SERPL-CCNC: 89 U/L (ref 55–135)
ALT SERPL W/O P-5'-P-CCNC: 27 U/L (ref 10–44)
ANION GAP SERPL CALC-SCNC: 5 MMOL/L (ref 8–16)
AST SERPL-CCNC: 23 U/L (ref 10–40)
BASOPHILS # BLD AUTO: 0.03 K/UL (ref 0–0.2)
BASOPHILS NFR BLD: 0.6 % (ref 0–1.9)
BILIRUB SERPL-MCNC: 0.4 MG/DL (ref 0.1–1)
BUN SERPL-MCNC: 15 MG/DL (ref 8–23)
CALCIUM SERPL-MCNC: 8.2 MG/DL (ref 8.7–10.5)
CHLORIDE SERPL-SCNC: 107 MMOL/L (ref 95–110)
CHOLEST SERPL-MCNC: 159 MG/DL (ref 120–199)
CHOLEST/HDLC SERPL: 3.5 {RATIO} (ref 2–5)
CO2 SERPL-SCNC: 26 MMOL/L (ref 23–29)
CREAT SERPL-MCNC: 1.1 MG/DL (ref 0.5–1.4)
DIFFERENTIAL METHOD: ABNORMAL
EOSINOPHIL # BLD AUTO: 0.2 K/UL (ref 0–0.5)
EOSINOPHIL NFR BLD: 4.2 % (ref 0–8)
ERYTHROCYTE [DISTWIDTH] IN BLOOD BY AUTOMATED COUNT: 16.3 % (ref 11.5–14.5)
EST. GFR  (AFRICAN AMERICAN): >60 ML/MIN/1.73 M^2
EST. GFR  (NON AFRICAN AMERICAN): >60 ML/MIN/1.73 M^2
GLUCOSE SERPL-MCNC: 101 MG/DL (ref 70–110)
HCT VFR BLD AUTO: 38.9 % (ref 40–54)
HDLC SERPL-MCNC: 45 MG/DL (ref 40–75)
HDLC SERPL: 28.3 % (ref 20–50)
HGB BLD-MCNC: 11.7 G/DL (ref 14–18)
IMM GRANULOCYTES # BLD AUTO: 0.02 K/UL (ref 0–0.04)
IMM GRANULOCYTES NFR BLD AUTO: 0.4 % (ref 0–0.5)
LDLC SERPL CALC-MCNC: 98.8 MG/DL (ref 63–159)
LYMPHOCYTES # BLD AUTO: 2.1 K/UL (ref 1–4.8)
LYMPHOCYTES NFR BLD: 43 % (ref 18–48)
MCH RBC QN AUTO: 22.3 PG (ref 27–31)
MCHC RBC AUTO-ENTMCNC: 30.1 G/DL (ref 32–36)
MCV RBC AUTO: 74 FL (ref 82–98)
MONOCYTES # BLD AUTO: 0.5 K/UL (ref 0.3–1)
MONOCYTES NFR BLD: 9.6 % (ref 4–15)
NEUTROPHILS # BLD AUTO: 2 K/UL (ref 1.8–7.7)
NEUTROPHILS NFR BLD: 42.2 % (ref 38–73)
NONHDLC SERPL-MCNC: 114 MG/DL
NRBC BLD-RTO: 0 /100 WBC
PLATELET # BLD AUTO: 277 K/UL (ref 150–350)
PMV BLD AUTO: 10.1 FL (ref 9.2–12.9)
POTASSIUM SERPL-SCNC: 3.9 MMOL/L (ref 3.5–5.1)
PROT SERPL-MCNC: 6.9 G/DL (ref 6–8.4)
RBC # BLD AUTO: 5.25 M/UL (ref 4.6–6.2)
SODIUM SERPL-SCNC: 138 MMOL/L (ref 136–145)
TRIGL SERPL-MCNC: 76 MG/DL (ref 30–150)
WBC # BLD AUTO: 4.79 K/UL (ref 3.9–12.7)

## 2020-09-10 PROCEDURE — 85025 COMPLETE CBC W/AUTO DIFF WBC: CPT

## 2020-09-10 PROCEDURE — 36415 COLL VENOUS BLD VENIPUNCTURE: CPT | Mod: PO

## 2020-09-10 PROCEDURE — 80061 LIPID PANEL: CPT

## 2020-09-10 PROCEDURE — 80053 COMPREHEN METABOLIC PANEL: CPT

## 2020-09-10 RX ORDER — PROMETHAZINE HYDROCHLORIDE 25 MG/1
25 TABLET ORAL EVERY 6 HOURS PRN
Qty: 8 TABLET | Refills: 0 | Status: SHIPPED | OUTPATIENT
Start: 2020-09-10 | End: 2021-11-29

## 2020-09-10 RX ORDER — HYDROCODONE BITARTRATE AND ACETAMINOPHEN 10; 325 MG/1; MG/1
TABLET ORAL
Qty: 15 TABLET | Refills: 0 | Status: SHIPPED | OUTPATIENT
Start: 2020-09-10 | End: 2021-11-29

## 2020-09-10 NOTE — H&P
Aaron Guerra  is here for a completion of his perioperative paperwork. he  Is scheduled to undergo     left              a. Knee arthroscopic medial meniscectomy possible lateral menisectomy              b. Knee arthroscopic possible plica excision              c. Knee arthroscopic possible chondroplasty on 9/15/20.      He is a healthy individual but does need clearance for this procedure which he has received from his PCP, Dr. Villalta.     PAST MEDICAL HISTORY:   Past Medical History:   Diagnosis Date    Arthritis     BPH (benign prostatic hypertrophy)     Cataract     Hypertension 2012     PAST SURGICAL HISTORY:   Past Surgical History:   Procedure Laterality Date    COLONOSCOPY N/A 12/1/2015    Procedure: COLONOSCOPY;  Surgeon: Meagan Hill MD;  [facility]  Service: Endoscopy;  Laterality: N/A;    CYSTOSCOPY WITH INSERTION OF MINIMALLY INVASIVE IMPLANT TO ENLARGE PROSTATIC URETHRA N/A 8/15/2018    Procedure: TRANSPROSTATIC TISSUE RETRACTION;  Surgeon: Trenton Weathers MD;  Location: [facility];  Service: Urology;  Laterality: N/A;    TONSILLECTOMY       FAMILY HISTORY:   Family History   Problem Relation Age of Onset    Cataracts Mother     Diabetes Mother     Hypertension Mother     Stroke Mother     Heart attack Sister     Drug abuse Brother     Hypertension Brother     Diabetes Sister     Hypertension Brother     Hypertension Sister      SOCIAL HISTORY:   Social History     Socioeconomic History    Marital status:      Spouse name: Not on file    Number of children: 2    Years of education: Not on file    Highest education level: Not on file   Occupational History    Occupation: sales   Social Needs    Financial resource strain: Not hard at all    Food insecurity     Worry: Never true     Inability: Never true    Transportation needs     Medical: No     Non-medical: No   Tobacco Use    Smoking status: Never Smoker    Smokeless tobacco: Never Used   Substance  and Sexual Activity    Alcohol use: No    Drug use: No    Sexual activity: Yes     Partners: Female   Lifestyle    Physical activity     Days per week: 0 days     Minutes per session: Not on file    Stress: Very much   Relationships    Social connections     Talks on phone: More than three times a week     Gets together: Once a week     Attends Protestant service: More than 4 times per year     Active member of club or organization: Yes     Attends meetings of clubs or organizations: More than 4 times per year     Relationship status:    Other Topics Concern    Not on file   Social History Narrative    The patient does not exercise regularly ().      He is not satisfied with weight.    Rates diet as good.    He does not drink at least 1/2 gallon water daily.    He drinks 2 coffee/tea/caffeine-containing soft drinks daily.    Total sleep time at night is 4-5 hours.    He does wear seat belts.    Hobbies include none.               MEDICATIONS:   Current Outpatient Medications:     amLODIPine (NORVASC) 5 MG tablet, Take 1 tablet (5 mg total) by mouth once daily., Disp: 30 tablet, Rfl: 2    ascorbate calcium (VITAMIN C ORAL), Take 1 tablet by mouth once daily., Disp: , Rfl:     aspirin (ECOTRIN) 81 MG EC tablet, Take 81 mg by mouth once daily., Disp: , Rfl:     atorvastatin (LIPITOR) 40 MG tablet, Take 1 tablet (40 mg total) by mouth once daily., Disp: 30 tablet, Rfl: 2    ketoconazole (NIZORAL) 2 % shampoo, Apply topically twice a week., Disp: 120 mL, Rfl: 1    vitamin E acetate (VITAMIN E ORAL), Take 1 tablet by mouth once daily., Disp: , Rfl:     HYDROcodone-acetaminophen (NORCO)  mg per tablet, Take 1 tablet by mouth every 4-6 hours for pain., Disp: 15 tablet, Rfl: 0    promethazine (PHENERGAN) 25 MG tablet, Take 1 tablet (25 mg total) by mouth every 6 (six) hours as needed for Nausea., Disp: 8 tablet, Rfl: 0  ALLERGIES: Review of patient's allergies indicates:  No Known  "Allergies    VITAL SIGNS: BP (!) 142/85   Pulse 82   Ht 5' 6" (1.676 m)   Wt 86.6 kg (191 lb)   BMI 30.83 kg/m²      Risks, indications and benefits of the surgical procedure were discussed with the patient. All questions with regard to surgery, rehab, expected return to functional activities, activities of daily living and recreational endeavors were answered to his satisfaction.    It was explained to the patient that there may be an increase in surgical risks if the patient has certain co-morbidities such as but not limited to: Obesity, Cardiovascular issues (CHF, CAD, Arrhythmias), chronic pulmonary issues, previous or current neurovascular/neurological issues, previous strokes, diabetes mellitus, previous wound healing issues, previous wound or skin infections, PVD, clotting disorders, if the patient uses chronic steroids, if the patient takes or has immune compromising medications or diseases, or has previously or currently used tobacco products.     The patient verbalized that he/she does not have any additional clotting, bleeding, or blood disorders, other than what is list in her chart on today's review.     Then a brief history and physical exam were performed.    Review of Systems   Constitution: Negative. Negative for chills, fever and night sweats.   HENT: Negative for congestion and headaches.    Eyes: Negative for blurred vision, left vision loss and right vision loss.   Cardiovascular: Negative for chest pain and syncope.   Respiratory: Negative for cough and shortness of breath.    Endocrine: Negative for polydipsia, polyphagia and polyuria.   Hematologic/Lymphatic: Negative for bleeding problem. Does not bruise/bleed easily.   Skin: Negative for dry skin, itching and rash.   Musculoskeletal: Negative for falls and muscle weakness.   Gastrointestinal: Negative for abdominal pain and bowel incontinence.   Genitourinary: Negative for bladder incontinence and nocturia.   Neurological: Negative for " disturbances in coordination, loss of balance and seizures.   Psychiatric/Behavioral: Negative for depression. The patient does not have insomnia.    Allergic/Immunologic: Negative for hives and persistent infections.     PHYSICAL EXAM:  GEN: A&Ox3, WD WN NAD  HEENT: WNL  CHEST: CTAB, no W/R/R  HEART: RRR, no M/R/G  ABD: Soft, NT ND, BS x4 QUADS  MS; See Epic  NEURO: CN II-XII intact       The surgical consent was then reviewed with the patient, who agreed with all the contents of the consent form and it was signed. he was then given the surgery packet to bring with him to surgery [place] for the anesthesia portion of his perioperative paperwork (if needed)   For all physicians except for Dr. Vargas, we will email and possibly fax the consent forms and booking sheets to [facility].    The patient was given the opportunity to ask questions about the surgical plan and consent form, and once no other questions were asked, I proceeded with the pre-op appointment.    PHYSICAL THERAPY:  He was also instructed regarding physical therapy and will begin on  POD1. He was given a copy of the original prescription to schedule. Another copy of this prescription was also faxed to [facility] in [facility].    POST OP CARE:instructions were reviewed including care of the wound and dressing after surgery and when he can shower.     CRUTCHES OR WALKER: It was explained to the patient that if they are having a lower extremity surgery that they will require either a walker or crutches to ambulate safely with after surgery. It was explained that a cane or other assistive devices are not sufficient to safely ambulate with after surgery. I explained to the patient that I will place an order for them to receive either crutches or a walker after surgery to go home with. It was explained that if they have crutches or a walker at home already, that they are REQUIRED to bring them to the hospital on the day of surgery.  It was explained that if they do not have them at the hospital on the day of surgery that they WILL be provided a new pair or crutches or a walker to go home with to ensure ambulation will be safe if the patient needs to stop somewhere on the way home.      PAIN MANAGEMENT: Aaron Guerra was also given a pain management regime, which includes the TENS unit given to him by renetta along with the education required for its use. He was also instructed regarding the Polar ice unit that will be in place after surgery and his postoperative pain medications.     PAIN MEDICATION:  Norco 10/325mg 1 po q 4-6 hours prn pain    Phenergan 25 mg one p.o. q.6 hours p.r.n. nausea and vomiting.    DVT prophylaxis was discussed with the patient today including risk factors for developing DVTs and history of DVTs. The patient was asked if any specific recommendations were given from the doctor/s that did pre-operative surgical clearance. The patient was then given an education sheet about DVTs and PE with warning signs and symptoms of both and steps to take if they suspect either of these.    This along with the Modified Caprini risk assessment model for VTE in general surgical patients was used to determine the patient's DVT risk.     From: Dipti MK, Emanuel DA, Crista SM, et al. Prevention of VTE in nonorthopedic surgical patients: antithrombotic therapy and prevention of thrombosis, 9th ed: American College of Chest Physicians evidence-based clinical practical guidelines. Chest 2012; 141:e227S. Copyright © 2012. Reproduced with permission from the American College of Chest Physicians.    The below listed DVT prophylaxis regimen along with bilateral NILE compression stockings will be used post-op. Length of treatment has been determined to be 10-42 days post-op by the above noted Caprini assessment model.     The patient was instructed to buy and take:  Aspirin 81mg QD x 4 weeks for DVT prophylaxis starting on the morning after  surgery.  Patient will also use bilateral TEDs on lower extremities, SCDs during surgery, and early ambulation post-op. If the patient was previously taking 81mg baby aspirin, they were told to not take it starting 5 days prior to surgery and to restart the 81mg aspirin after surgery.       Patient was also told to buy over the counter Prilosec medication if needed and take it once daily for GI protection as long as they are taking NSAIDs or Aspirin.    Patient denies history of seizures.     The patient was told that narcotic pain medications may make them drowsy and instructions were given to not sign legal documents, drive or operate heavy machinery, cars, or equipment while under the influence of narcotic medications. The patient was told and understands that narcotic pain medications should only be used as needed to control pain and that other options of pain control include TENs unit and ice packs/unit.     As there were no other questions to be asked, he was given my business card along with Whitley Rojo MD business card if he has any questions or concerns prior to surgery or in the postop period.

## 2020-09-10 NOTE — H&P (VIEW-ONLY)
Aaron Guerra  is here for a completion of his perioperative paperwork. he  Is scheduled to undergo     left              a. Knee arthroscopic medial meniscectomy possible lateral menisectomy              b. Knee arthroscopic possible plica excision              c. Knee arthroscopic possible chondroplasty on 9/15/20.      He is a healthy individual but does need clearance for this procedure which he has received from his PCP, Dr. Villalta.     PAST MEDICAL HISTORY:   Past Medical History:   Diagnosis Date    Arthritis     BPH (benign prostatic hypertrophy)     Cataract     Hypertension 2012     PAST SURGICAL HISTORY:   Past Surgical History:   Procedure Laterality Date    COLONOSCOPY N/A 12/1/2015    Procedure: COLONOSCOPY;  Surgeon: Meagan Hill MD;  [facility]  Service: Endoscopy;  Laterality: N/A;    CYSTOSCOPY WITH INSERTION OF MINIMALLY INVASIVE IMPLANT TO ENLARGE PROSTATIC URETHRA N/A 8/15/2018    Procedure: TRANSPROSTATIC TISSUE RETRACTION;  Surgeon: Trenton Weathers MD;  Location: [facility];  Service: Urology;  Laterality: N/A;    TONSILLECTOMY       FAMILY HISTORY:   Family History   Problem Relation Age of Onset    Cataracts Mother     Diabetes Mother     Hypertension Mother     Stroke Mother     Heart attack Sister     Drug abuse Brother     Hypertension Brother     Diabetes Sister     Hypertension Brother     Hypertension Sister      SOCIAL HISTORY:   Social History     Socioeconomic History    Marital status:      Spouse name: Not on file    Number of children: 2    Years of education: Not on file    Highest education level: Not on file   Occupational History    Occupation: sales   Social Needs    Financial resource strain: Not hard at all    Food insecurity     Worry: Never true     Inability: Never true    Transportation needs     Medical: No     Non-medical: No   Tobacco Use    Smoking status: Never Smoker    Smokeless tobacco: Never Used   Substance  and Sexual Activity    Alcohol use: No    Drug use: No    Sexual activity: Yes     Partners: Female   Lifestyle    Physical activity     Days per week: 0 days     Minutes per session: Not on file    Stress: Very much   Relationships    Social connections     Talks on phone: More than three times a week     Gets together: Once a week     Attends Nondenominational service: More than 4 times per year     Active member of club or organization: Yes     Attends meetings of clubs or organizations: More than 4 times per year     Relationship status:    Other Topics Concern    Not on file   Social History Narrative    The patient does not exercise regularly ().      He is not satisfied with weight.    Rates diet as good.    He does not drink at least 1/2 gallon water daily.    He drinks 2 coffee/tea/caffeine-containing soft drinks daily.    Total sleep time at night is 4-5 hours.    He does wear seat belts.    Hobbies include none.               MEDICATIONS:   Current Outpatient Medications:     amLODIPine (NORVASC) 5 MG tablet, Take 1 tablet (5 mg total) by mouth once daily., Disp: 30 tablet, Rfl: 2    ascorbate calcium (VITAMIN C ORAL), Take 1 tablet by mouth once daily., Disp: , Rfl:     aspirin (ECOTRIN) 81 MG EC tablet, Take 81 mg by mouth once daily., Disp: , Rfl:     atorvastatin (LIPITOR) 40 MG tablet, Take 1 tablet (40 mg total) by mouth once daily., Disp: 30 tablet, Rfl: 2    ketoconazole (NIZORAL) 2 % shampoo, Apply topically twice a week., Disp: 120 mL, Rfl: 1    vitamin E acetate (VITAMIN E ORAL), Take 1 tablet by mouth once daily., Disp: , Rfl:     HYDROcodone-acetaminophen (NORCO)  mg per tablet, Take 1 tablet by mouth every 4-6 hours for pain., Disp: 15 tablet, Rfl: 0    promethazine (PHENERGAN) 25 MG tablet, Take 1 tablet (25 mg total) by mouth every 6 (six) hours as needed for Nausea., Disp: 8 tablet, Rfl: 0  ALLERGIES: Review of patient's allergies indicates:  No Known  "Allergies    VITAL SIGNS: BP (!) 142/85   Pulse 82   Ht 5' 6" (1.676 m)   Wt 86.6 kg (191 lb)   BMI 30.83 kg/m²      Risks, indications and benefits of the surgical procedure were discussed with the patient. All questions with regard to surgery, rehab, expected return to functional activities, activities of daily living and recreational endeavors were answered to his satisfaction.    It was explained to the patient that there may be an increase in surgical risks if the patient has certain co-morbidities such as but not limited to: Obesity, Cardiovascular issues (CHF, CAD, Arrhythmias), chronic pulmonary issues, previous or current neurovascular/neurological issues, previous strokes, diabetes mellitus, previous wound healing issues, previous wound or skin infections, PVD, clotting disorders, if the patient uses chronic steroids, if the patient takes or has immune compromising medications or diseases, or has previously or currently used tobacco products.     The patient verbalized that he/she does not have any additional clotting, bleeding, or blood disorders, other than what is list in her chart on today's review.     Then a brief history and physical exam were performed.    Review of Systems   Constitution: Negative. Negative for chills, fever and night sweats.   HENT: Negative for congestion and headaches.    Eyes: Negative for blurred vision, left vision loss and right vision loss.   Cardiovascular: Negative for chest pain and syncope.   Respiratory: Negative for cough and shortness of breath.    Endocrine: Negative for polydipsia, polyphagia and polyuria.   Hematologic/Lymphatic: Negative for bleeding problem. Does not bruise/bleed easily.   Skin: Negative for dry skin, itching and rash.   Musculoskeletal: Negative for falls and muscle weakness.   Gastrointestinal: Negative for abdominal pain and bowel incontinence.   Genitourinary: Negative for bladder incontinence and nocturia.   Neurological: Negative for " disturbances in coordination, loss of balance and seizures.   Psychiatric/Behavioral: Negative for depression. The patient does not have insomnia.    Allergic/Immunologic: Negative for hives and persistent infections.     PHYSICAL EXAM:  GEN: A&Ox3, WD WN NAD  HEENT: WNL  CHEST: CTAB, no W/R/R  HEART: RRR, no M/R/G  ABD: Soft, NT ND, BS x4 QUADS  MS; See Epic  NEURO: CN II-XII intact       The surgical consent was then reviewed with the patient, who agreed with all the contents of the consent form and it was signed. he was then given the surgery packet to bring with him to surgery [place] for the anesthesia portion of his perioperative paperwork (if needed)   For all physicians except for Dr. Vargsa, we will email and possibly fax the consent forms and booking sheets to [facility].    The patient was given the opportunity to ask questions about the surgical plan and consent form, and once no other questions were asked, I proceeded with the pre-op appointment.    PHYSICAL THERAPY:  He was also instructed regarding physical therapy and will begin on  POD1. He was given a copy of the original prescription to schedule. Another copy of this prescription was also faxed to [facility] in [facility].    POST OP CARE:instructions were reviewed including care of the wound and dressing after surgery and when he can shower.     CRUTCHES OR WALKER: It was explained to the patient that if they are having a lower extremity surgery that they will require either a walker or crutches to ambulate safely with after surgery. It was explained that a cane or other assistive devices are not sufficient to safely ambulate with after surgery. I explained to the patient that I will place an order for them to receive either crutches or a walker after surgery to go home with. It was explained that if they have crutches or a walker at home already, that they are REQUIRED to bring them to the hospital on the day of surgery.  It was explained that if they do not have them at the hospital on the day of surgery that they WILL be provided a new pair or crutches or a walker to go home with to ensure ambulation will be safe if the patient needs to stop somewhere on the way home.      PAIN MANAGEMENT: Aaron Guerra was also given a pain management regime, which includes the TENS unit given to him by renetta along with the education required for its use. He was also instructed regarding the Polar ice unit that will be in place after surgery and his postoperative pain medications.     PAIN MEDICATION:  Norco 10/325mg 1 po q 4-6 hours prn pain    Phenergan 25 mg one p.o. q.6 hours p.r.n. nausea and vomiting.    DVT prophylaxis was discussed with the patient today including risk factors for developing DVTs and history of DVTs. The patient was asked if any specific recommendations were given from the doctor/s that did pre-operative surgical clearance. The patient was then given an education sheet about DVTs and PE with warning signs and symptoms of both and steps to take if they suspect either of these.    This along with the Modified Caprini risk assessment model for VTE in general surgical patients was used to determine the patient's DVT risk.     From: Dipti MK, Emanuel DA, Crista SM, et al. Prevention of VTE in nonorthopedic surgical patients: antithrombotic therapy and prevention of thrombosis, 9th ed: American College of Chest Physicians evidence-based clinical practical guidelines. Chest 2012; 141:e227S. Copyright © 2012. Reproduced with permission from the American College of Chest Physicians.    The below listed DVT prophylaxis regimen along with bilateral NILE compression stockings will be used post-op. Length of treatment has been determined to be 10-42 days post-op by the above noted Caprini assessment model.     The patient was instructed to buy and take:  Aspirin 81mg QD x 4 weeks for DVT prophylaxis starting on the morning after  surgery.  Patient will also use bilateral TEDs on lower extremities, SCDs during surgery, and early ambulation post-op. If the patient was previously taking 81mg baby aspirin, they were told to not take it starting 5 days prior to surgery and to restart the 81mg aspirin after surgery.       Patient was also told to buy over the counter Prilosec medication if needed and take it once daily for GI protection as long as they are taking NSAIDs or Aspirin.    Patient denies history of seizures.     The patient was told that narcotic pain medications may make them drowsy and instructions were given to not sign legal documents, drive or operate heavy machinery, cars, or equipment while under the influence of narcotic medications. The patient was told and understands that narcotic pain medications should only be used as needed to control pain and that other options of pain control include TENs unit and ice packs/unit.     As there were no other questions to be asked, he was given my business card along with Whitley Rojo MD business card if he has any questions or concerns prior to surgery or in the postop period.

## 2020-09-11 ENCOUNTER — ANESTHESIA EVENT (OUTPATIENT)
Dept: SURGERY | Facility: HOSPITAL | Age: 65
End: 2020-09-11
Payer: COMMERCIAL

## 2020-09-12 ENCOUNTER — LAB VISIT (OUTPATIENT)
Dept: SPORTS MEDICINE | Facility: CLINIC | Age: 65
End: 2020-09-12
Payer: COMMERCIAL

## 2020-09-12 DIAGNOSIS — Z01.818 PRE-OP TESTING: ICD-10-CM

## 2020-09-12 LAB — SARS-COV-2 RNA RESP QL NAA+PROBE: NOT DETECTED

## 2020-09-12 PROCEDURE — U0003 INFECTIOUS AGENT DETECTION BY NUCLEIC ACID (DNA OR RNA); SEVERE ACUTE RESPIRATORY SYNDROME CORONAVIRUS 2 (SARS-COV-2) (CORONAVIRUS DISEASE [COVID-19]), AMPLIFIED PROBE TECHNIQUE, MAKING USE OF HIGH THROUGHPUT TECHNOLOGIES AS DESCRIBED BY CMS-2020-01-R: HCPCS

## 2020-09-12 RX ORDER — SODIUM CHLORIDE 9 MG/ML
INJECTION, SOLUTION INTRAVENOUS CONTINUOUS
Status: CANCELLED | OUTPATIENT
Start: 2020-09-12

## 2020-09-14 DIAGNOSIS — Z01.818 PRE-OP TESTING: Primary | ICD-10-CM

## 2020-09-15 ENCOUNTER — TELEPHONE (OUTPATIENT)
Dept: SPORTS MEDICINE | Facility: CLINIC | Age: 65
End: 2020-09-15

## 2020-09-15 ENCOUNTER — ANESTHESIA (OUTPATIENT)
Dept: SURGERY | Facility: HOSPITAL | Age: 65
End: 2020-09-15
Payer: COMMERCIAL

## 2020-09-15 NOTE — TELEPHONE ENCOUNTER
----- Message from Yodit Grant sent at 9/15/2020  4:18 PM CDT -----  Contact: pt  Please call pt at [phone]    Patient is returning staff call regarding future surgery    Thank you

## 2020-09-19 ENCOUNTER — LAB VISIT (OUTPATIENT)
Dept: SPORTS MEDICINE | Facility: CLINIC | Age: 65
End: 2020-09-19
Payer: COMMERCIAL

## 2020-09-19 DIAGNOSIS — Z01.818 PRE-OP TESTING: ICD-10-CM

## 2020-09-19 PROCEDURE — U0003 INFECTIOUS AGENT DETECTION BY NUCLEIC ACID (DNA OR RNA); SEVERE ACUTE RESPIRATORY SYNDROME CORONAVIRUS 2 (SARS-COV-2) (CORONAVIRUS DISEASE [COVID-19]), AMPLIFIED PROBE TECHNIQUE, MAKING USE OF HIGH THROUGHPUT TECHNOLOGIES AS DESCRIBED BY CMS-2020-01-R: HCPCS

## 2020-09-20 LAB — SARS-COV-2 RNA RESP QL NAA+PROBE: NOT DETECTED

## 2020-09-22 ENCOUNTER — HOSPITAL ENCOUNTER (OUTPATIENT)
Facility: HOSPITAL | Age: 65
Discharge: HOME | End: 2020-09-22
Attending: ORTHOPAEDIC SURGERY | Admitting: ORTHOPAEDIC SURGERY
Payer: COMMERCIAL

## 2020-09-22 VITALS
TEMPERATURE: 97 F | HEIGHT: 66 IN | RESPIRATION RATE: 18 BRPM | WEIGHT: 190 LBS | OXYGEN SATURATION: 99 % | BODY MASS INDEX: 30.53 KG/M2 | SYSTOLIC BLOOD PRESSURE: 148 MMHG | HEART RATE: 76 BPM | DIASTOLIC BLOOD PRESSURE: 86 MMHG

## 2020-09-22 DIAGNOSIS — M23.201 OLD TEAR OF LATERAL MENISCUS OF LEFT KNEE, UNSPECIFIED TEAR TYPE: ICD-10-CM

## 2020-09-22 DIAGNOSIS — M23.204 OLD BUCKET HANDLE TEAR OF MEDIAL MENISCUS OF LEFT KNEE: ICD-10-CM

## 2020-09-22 PROCEDURE — D9220A PRA ANESTHESIA: Mod: CRNA,,, | Performed by: NURSE ANESTHETIST, CERTIFIED REGISTERED

## 2020-09-22 PROCEDURE — 71000033 HC RECOVERY, INTIAL HOUR: Performed by: ORTHOPAEDIC SURGERY

## 2020-09-22 PROCEDURE — 25000003 PHARM REV CODE 250: Performed by: NURSE ANESTHETIST, CERTIFIED REGISTERED

## 2020-09-22 PROCEDURE — 29880 PR KNEE SCOPE MED/LAT MENISCECTOMY: ICD-10-PCS | Mod: LT,,, | Performed by: ORTHOPAEDIC SURGERY

## 2020-09-22 PROCEDURE — 99900035 HC TECH TIME PER 15 MIN (STAT)

## 2020-09-22 PROCEDURE — 37000009 HC ANESTHESIA EA ADD 15 MINS: Performed by: ORTHOPAEDIC SURGERY

## 2020-09-22 PROCEDURE — 63600175 PHARM REV CODE 636 W HCPCS: Performed by: PHYSICIAN ASSISTANT

## 2020-09-22 PROCEDURE — D9220A PRA ANESTHESIA: ICD-10-PCS | Mod: ANES,,, | Performed by: ANESTHESIOLOGY

## 2020-09-22 PROCEDURE — 36000711: Performed by: ORTHOPAEDIC SURGERY

## 2020-09-22 PROCEDURE — 71000015 HC POSTOP RECOV 1ST HR: Performed by: ORTHOPAEDIC SURGERY

## 2020-09-22 PROCEDURE — 63600175 PHARM REV CODE 636 W HCPCS: Performed by: ORTHOPAEDIC SURGERY

## 2020-09-22 PROCEDURE — 63600175 PHARM REV CODE 636 W HCPCS: Performed by: NURSE ANESTHETIST, CERTIFIED REGISTERED

## 2020-09-22 PROCEDURE — 29880 ARTHRS KNE SRG MNISECTMY M&L: CPT | Mod: LT,,, | Performed by: ORTHOPAEDIC SURGERY

## 2020-09-22 PROCEDURE — 25000003 PHARM REV CODE 250: Performed by: ORTHOPAEDIC SURGERY

## 2020-09-22 PROCEDURE — 27201423 OPTIME MED/SURG SUP & DEVICES STERILE SUPPLY: Performed by: ORTHOPAEDIC SURGERY

## 2020-09-22 PROCEDURE — D9220A PRA ANESTHESIA: Mod: ANES,,, | Performed by: ANESTHESIOLOGY

## 2020-09-22 PROCEDURE — 25000003 PHARM REV CODE 250: Performed by: PHYSICIAN ASSISTANT

## 2020-09-22 PROCEDURE — D9220A PRA ANESTHESIA: ICD-10-PCS | Mod: CRNA,,, | Performed by: NURSE ANESTHETIST, CERTIFIED REGISTERED

## 2020-09-22 PROCEDURE — 71000039 HC RECOVERY, EACH ADD'L HOUR: Performed by: ORTHOPAEDIC SURGERY

## 2020-09-22 PROCEDURE — 36000710: Performed by: ORTHOPAEDIC SURGERY

## 2020-09-22 PROCEDURE — 37000008 HC ANESTHESIA 1ST 15 MINUTES: Performed by: ORTHOPAEDIC SURGERY

## 2020-09-22 PROCEDURE — 25000003 PHARM REV CODE 250: Performed by: ANESTHESIOLOGY

## 2020-09-22 PROCEDURE — 94761 N-INVAS EAR/PLS OXIMETRY MLT: CPT

## 2020-09-22 PROCEDURE — 25000003 PHARM REV CODE 250: Performed by: STUDENT IN AN ORGANIZED HEALTH CARE EDUCATION/TRAINING PROGRAM

## 2020-09-22 RX ORDER — PREGABALIN 75 MG/1
75 CAPSULE ORAL ONCE
Status: COMPLETED | OUTPATIENT
Start: 2020-09-22 | End: 2020-09-22

## 2020-09-22 RX ORDER — PROPOFOL 10 MG/ML
VIAL (ML) INTRAVENOUS
Status: DISCONTINUED | OUTPATIENT
Start: 2020-09-22 | End: 2020-09-22

## 2020-09-22 RX ORDER — PHENYLEPHRINE HYDROCHLORIDE 10 MG/ML
INJECTION INTRAVENOUS
Status: DISCONTINUED | OUTPATIENT
Start: 2020-09-22 | End: 2020-09-22

## 2020-09-22 RX ORDER — PROMETHAZINE HYDROCHLORIDE 25 MG/1
25 TABLET ORAL EVERY 6 HOURS PRN
Status: DISCONTINUED | OUTPATIENT
Start: 2020-09-22 | End: 2020-09-22 | Stop reason: HOSPADM

## 2020-09-22 RX ORDER — OXYCODONE HYDROCHLORIDE 5 MG/1
10 TABLET ORAL EVERY 4 HOURS PRN
Status: DISCONTINUED | OUTPATIENT
Start: 2020-09-22 | End: 2020-09-22 | Stop reason: HOSPADM

## 2020-09-22 RX ORDER — FENTANYL CITRATE 50 UG/ML
INJECTION, SOLUTION INTRAMUSCULAR; INTRAVENOUS
Status: DISCONTINUED | OUTPATIENT
Start: 2020-09-22 | End: 2020-09-22

## 2020-09-22 RX ORDER — EPINEPHRINE 1 MG/ML
INJECTION, SOLUTION INTRACARDIAC; INTRAMUSCULAR; INTRAVENOUS; SUBCUTANEOUS
Status: DISCONTINUED | OUTPATIENT
Start: 2020-09-22 | End: 2020-09-22 | Stop reason: HOSPADM

## 2020-09-22 RX ORDER — KETOROLAC TROMETHAMINE 30 MG/ML
INJECTION, SOLUTION INTRAMUSCULAR; INTRAVENOUS
Status: DISCONTINUED | OUTPATIENT
Start: 2020-09-22 | End: 2020-09-22 | Stop reason: HOSPADM

## 2020-09-22 RX ORDER — TRAMADOL HYDROCHLORIDE 50 MG/1
100 TABLET ORAL ONCE
Status: COMPLETED | OUTPATIENT
Start: 2020-09-22 | End: 2020-09-22

## 2020-09-22 RX ORDER — DEXMEDETOMIDINE HYDROCHLORIDE 100 UG/ML
INJECTION, SOLUTION INTRAVENOUS
Status: DISCONTINUED | OUTPATIENT
Start: 2020-09-22 | End: 2020-09-22

## 2020-09-22 RX ORDER — LIDOCAINE HYDROCHLORIDE 20 MG/ML
INJECTION INTRAVENOUS
Status: DISCONTINUED | OUTPATIENT
Start: 2020-09-22 | End: 2020-09-22

## 2020-09-22 RX ORDER — MIDAZOLAM HYDROCHLORIDE 1 MG/ML
INJECTION, SOLUTION INTRAMUSCULAR; INTRAVENOUS
Status: DISCONTINUED | OUTPATIENT
Start: 2020-09-22 | End: 2020-09-22

## 2020-09-22 RX ORDER — ONDANSETRON 2 MG/ML
4 INJECTION INTRAMUSCULAR; INTRAVENOUS ONCE AS NEEDED
Status: DISCONTINUED | OUTPATIENT
Start: 2020-09-22 | End: 2020-09-22 | Stop reason: HOSPADM

## 2020-09-22 RX ORDER — ONDANSETRON 2 MG/ML
4 INJECTION INTRAMUSCULAR; INTRAVENOUS EVERY 12 HOURS PRN
Status: DISCONTINUED | OUTPATIENT
Start: 2020-09-22 | End: 2020-09-22 | Stop reason: HOSPADM

## 2020-09-22 RX ORDER — HYDROMORPHONE HYDROCHLORIDE 1 MG/ML
0.2 INJECTION, SOLUTION INTRAMUSCULAR; INTRAVENOUS; SUBCUTANEOUS EVERY 5 MIN PRN
Status: DISCONTINUED | OUTPATIENT
Start: 2020-09-22 | End: 2020-09-22 | Stop reason: HOSPADM

## 2020-09-22 RX ORDER — AMLODIPINE BESYLATE 5 MG/1
5 TABLET ORAL ONCE
Status: COMPLETED | OUTPATIENT
Start: 2020-09-22 | End: 2020-09-22

## 2020-09-22 RX ORDER — PROCHLORPERAZINE EDISYLATE 5 MG/ML
5 INJECTION INTRAMUSCULAR; INTRAVENOUS ONCE AS NEEDED
Status: DISCONTINUED | OUTPATIENT
Start: 2020-09-22 | End: 2020-09-22 | Stop reason: HOSPADM

## 2020-09-22 RX ORDER — DEXAMETHASONE SODIUM PHOSPHATE 4 MG/ML
INJECTION, SOLUTION INTRA-ARTICULAR; INTRALESIONAL; INTRAMUSCULAR; INTRAVENOUS; SOFT TISSUE
Status: DISCONTINUED | OUTPATIENT
Start: 2020-09-22 | End: 2020-09-22

## 2020-09-22 RX ORDER — MORPHINE SULFATE 2 MG/ML
2 INJECTION, SOLUTION INTRAMUSCULAR; INTRAVENOUS EVERY 10 MIN PRN
Status: DISCONTINUED | OUTPATIENT
Start: 2020-09-22 | End: 2020-09-22 | Stop reason: HOSPADM

## 2020-09-22 RX ORDER — ROPIVACAINE HYDROCHLORIDE 5 MG/ML
INJECTION, SOLUTION EPIDURAL; INFILTRATION; PERINEURAL
Status: DISCONTINUED | OUTPATIENT
Start: 2020-09-22 | End: 2020-09-22 | Stop reason: HOSPADM

## 2020-09-22 RX ORDER — SODIUM CHLORIDE 9 MG/ML
INJECTION, SOLUTION INTRAVENOUS CONTINUOUS
Status: DISCONTINUED | OUTPATIENT
Start: 2020-09-22 | End: 2020-09-22 | Stop reason: HOSPADM

## 2020-09-22 RX ORDER — KETAMINE HCL IN 0.9 % NACL 50 MG/5 ML
SYRINGE (ML) INTRAVENOUS
Status: DISCONTINUED | OUTPATIENT
Start: 2020-09-22 | End: 2020-09-22

## 2020-09-22 RX ORDER — LABETALOL HCL 20 MG/4 ML
10 SYRINGE (ML) INTRAVENOUS ONCE
Status: COMPLETED | OUTPATIENT
Start: 2020-09-22 | End: 2020-09-22

## 2020-09-22 RX ORDER — CEFAZOLIN SODIUM 1 G/3ML
2 INJECTION, POWDER, FOR SOLUTION INTRAMUSCULAR; INTRAVENOUS
Status: COMPLETED | OUTPATIENT
Start: 2020-09-22 | End: 2020-09-22

## 2020-09-22 RX ORDER — KETAMINE HYDROCHLORIDE 100 MG/ML
INJECTION, SOLUTION INTRAMUSCULAR; INTRAVENOUS
Status: DISCONTINUED | OUTPATIENT
Start: 2020-09-22 | End: 2020-09-22 | Stop reason: HOSPADM

## 2020-09-22 RX ORDER — FENTANYL CITRATE 50 UG/ML
25 INJECTION, SOLUTION INTRAMUSCULAR; INTRAVENOUS EVERY 5 MIN PRN
Status: DISCONTINUED | OUTPATIENT
Start: 2020-09-22 | End: 2020-09-22 | Stop reason: HOSPADM

## 2020-09-22 RX ADMIN — Medication 30 MG: at 01:09

## 2020-09-22 RX ADMIN — PHENYLEPHRINE HYDROCHLORIDE 100 MCG: 10 INJECTION INTRAVENOUS at 01:09

## 2020-09-22 RX ADMIN — DEXMEDETOMIDINE HYDROCHLORIDE 12 MCG: 100 INJECTION, SOLUTION, CONCENTRATE INTRAVENOUS at 01:09

## 2020-09-22 RX ADMIN — SODIUM CHLORIDE, SODIUM GLUCONATE, SODIUM ACETATE, POTASSIUM CHLORIDE, MAGNESIUM CHLORIDE, SODIUM PHOSPHATE, DIBASIC, AND POTASSIUM PHOSPHATE: .53; .5; .37; .037; .03; .012; .00082 INJECTION, SOLUTION INTRAVENOUS at 02:09

## 2020-09-22 RX ADMIN — PREGABALIN 75 MG: 75 CAPSULE ORAL at 03:09

## 2020-09-22 RX ADMIN — Medication 10 MG: at 04:09

## 2020-09-22 RX ADMIN — CEFAZOLIN 2 G: 330 INJECTION, POWDER, FOR SOLUTION INTRAMUSCULAR; INTRAVENOUS at 01:09

## 2020-09-22 RX ADMIN — MIDAZOLAM HYDROCHLORIDE 2 MG: 1 INJECTION, SOLUTION INTRAMUSCULAR; INTRAVENOUS at 01:09

## 2020-09-22 RX ADMIN — SODIUM CHLORIDE: 0.9 INJECTION, SOLUTION INTRAVENOUS at 10:09

## 2020-09-22 RX ADMIN — PROPOFOL 200 MG: 10 INJECTION, EMULSION INTRAVENOUS at 01:09

## 2020-09-22 RX ADMIN — TRAMADOL HYDROCHLORIDE 100 MG: 50 TABLET, FILM COATED ORAL at 03:09

## 2020-09-22 RX ADMIN — AMLODIPINE BESYLATE 5 MG: 5 TABLET ORAL at 03:09

## 2020-09-22 RX ADMIN — DEXAMETHASONE SODIUM PHOSPHATE 8 MG: 4 INJECTION, SOLUTION INTRAMUSCULAR; INTRAVENOUS at 01:09

## 2020-09-22 RX ADMIN — FENTANYL CITRATE 100 MCG: 50 INJECTION, SOLUTION INTRAMUSCULAR; INTRAVENOUS at 01:09

## 2020-09-22 RX ADMIN — LIDOCAINE HYDROCHLORIDE 40 MG: 20 INJECTION, SOLUTION INTRAVENOUS at 01:09

## 2020-09-22 RX ADMIN — PHENYLEPHRINE HYDROCHLORIDE 200 MCG: 10 INJECTION INTRAVENOUS at 01:09

## 2020-09-22 NOTE — INTERVAL H&P NOTE
The patient has been examined and the H&P has been reviewed:    I concur with the findings and no changes have occurred since H&P was written.    Anesthesia/Surgery risks, benefits and alternative options discussed and understood by patient/family.          Active Hospital Problems    Diagnosis  POA    Old bucket handle tear of medial meniscus of left knee [M23.204]  Yes      Resolved Hospital Problems   No resolved problems to display.

## 2020-09-22 NOTE — ANESTHESIA PREPROCEDURE EVALUATION
09/22/2020  Aaron Guerra is a 65 y.o., male.  Pre-operative evaluation for ARTHROSCOPY, KNEE, WITH MENISCECTOMY (Left), SYNOVECTOMY, KNEE (Left), CHONDROPLASTY, KNEE (Left)    Chief Complaint: torn menisci,  left knee    PMH:  Hypertension-uses amlodipine   Hypercholesteremia using statin   Pulmonary nodule    Beta thalassemia minor    ANGELIC-not using CPAP due to cost      Past Surgical History:   Procedure Laterality Date    COLONOSCOPY N/A 12/1/2015    Procedure: COLONOSCOPY;  Surgeon: Meagan Hill MD;  Location: [facility] ([address] [place] [address]);  Service: Endoscopy;  Laterality: N/A;    CYSTOSCOPY WITH INSERTION OF MINIMALLY INVASIVE IMPLANT TO ENLARGE PROSTATIC URETHRA N/A 8/15/2018    Procedure: TRANSPROSTATIC TISSUE RETRACTION;  Surgeon: Trenton Weathers MD;  Location: [facility];  Service: Urology;  Laterality: N/A;    TONSILLECTOMY           Vital Signs Range (Last 24H):         CBC:     Recent Labs   Lab 09/10/20  0846   WBC 4.79   RBC 5.25   HGB 11.7*   HCT 38.9*      MCV 74*   MCH 22.3*   MCHC 30.1*       CMP:   Recent Labs   Lab 09/10/20  0846      K 3.9      CO2 26   BUN 15   CREATININE 1.1      CALCIUM 8.2*   ALBUMIN 3.6   PROT 6.9   ALKPHOS 89   ALT 27   AST 23   BILITOT 0.4       INR:  No results for input(s): PT, INR, PROTIME, APTT in the last 720 hours.      Diagnostic Studies:      EKG:  Vent. Rate : 079 BPM     Atrial Rate : 079 BPM      P-R Int : 160 ms          QRS Dur : 086 ms       QT Int : 372 ms       P-R-T Axes : 062 046 024 degrees      QTc Int : 426 ms     Normal sinus rhythm   Normal ECG       2D Echo:  Anesthesia Evaluation    I have reviewed the Patient Summary Reports.     I have reviewed the Nursing Notes. I have reviewed the NPO Status.   I have reviewed the Medications.     Review of Systems  Anesthesia Hx:  No problems with previous Anesthesia     Social:  Non-Smoker    Neurological:  Neurology Normal        Physical Exam  General:  Well nourished    Airway/Jaw/Neck:  Airway Findings: Mouth Opening: Normal Tongue: Normal  General Airway Assessment: Good  Mallampati: II  Improves to II with phonation.  TM Distance: Normal, at least 6 cm  Jaw/Neck Findings:  Neck ROM: Normal ROM      Dental:  Dental Findings: In tact   Chest/Lungs:  Chest/Lungs Findings: Normal Respiratory Rate     Heart/Vascular:  Heart Findings:       Mental Status:  Mental Status Findings:  Cooperative, Alert and Oriented         Anesthesia Plan  Type of Anesthesia, risks & benefits discussed:  Anesthesia Type:  general  Patient's Preference:   Intra-op Monitoring Plan: standard ASA monitors  Intra-op Monitoring Plan Comments:   Post Op Pain Control Plan: multimodal analgesia and per primary service following discharge from PACU  Post Op Pain Control Plan Comments:   Induction:   IV  Beta Blocker:  Patient is not currently on a Beta-Blocker (No further documentation required).       Informed Consent: Patient understands risks and agrees with Anesthesia plan.  Questions answered. Anesthesia consent signed with patient.  ASA Score: 2     Day of Surgery Review of History & Physical:    H&P update referred to the surgeon.         Ready For Surgery From Anesthesia Perspective.

## 2020-09-22 NOTE — ANESTHESIA PROCEDURE NOTES
Intubation  Performed by: Nan Jean Baptiste CRNA  Authorized by: Israel Villareal MD     Intubation:     Induction:  Intravenous    Intubated:  Postinduction    Mask Ventilation:  Easy mask    Attempts:  1    Attempted By:  CRNA    Method of Intubation:  Other (see comments) (lma)    Difficult Airway Encountered?: No      Complications:  None    Airway Device:  Intubating LMA    Airway Device Size:  4.5    Style/Cuff Inflation:  Cuffed    Inflation Amount (mL):  0    Secured at:  The lips    Placement Verified By:  Capnometry    Complicating Factors:  None    Findings Post-Intubation:  BS equal bilateral

## 2020-09-22 NOTE — TRANSFER OF CARE
"Anesthesia Transfer of Care Note    Patient: Aaron Guerra    Procedure(s) Performed: Procedure(s) (LRB):  ARTHROSCOPY, KNEE, WITH MENISCECTOMY (Left)  SYNOVECTOMY, KNEE (Left)  CHONDROPLASTY, KNEE (Left)    Patient location: PACU    Anesthesia Type: general    Transport from OR: Transported from OR on 6-10 L/min O2 by face mask with adequate spontaneous ventilation    Post pain: adequate analgesia    Post assessment: no apparent anesthetic complications    Post vital signs: stable    Level of consciousness: sedated    Nausea/Vomiting: no nausea/vomiting    Complications: none    Transfer of care protocol was followed      Last vitals:   Visit Vitals  BP (!) 147/88   Pulse 82   Temp 37 °C (98.6 °F) (Oral)   Resp 18   Ht 5' 6" (1.676 m)   Wt 86.2 kg (190 lb)   SpO2 99%   BMI 30.67 kg/m²     "

## 2020-09-22 NOTE — PROGRESS NOTES
Patient resting quietly with spouse at bedside. Waiting to see if BP responds to po blood pressure medication.

## 2020-09-22 NOTE — ANESTHESIA POSTPROCEDURE EVALUATION
Anesthesia Post Evaluation    Patient: Aaron Guerra    Procedure(s) Performed: Procedure(s) (LRB):  ARTHROSCOPY, KNEE, WITH MENISCECTOMY (Left)  SYNOVECTOMY, KNEE (Left)  CHONDROPLASTY, KNEE (Left)  EXCISION, PLICA, KNEE, ARTHROSCOPIC (Left)  [id number] (Left)    Final Anesthesia Type: general    Patient location during evaluation: PACU  Patient participation: Yes- Able to Participate  Level of consciousness: awake and alert  Post-procedure vital signs: reviewed and stable  Pain management: adequate  Airway patency: patent    PONV status at discharge: No PONV  Anesthetic complications: no      Cardiovascular status: blood pressure returned to baseline  Respiratory status: unassisted  Hydration status: euvolemic  Follow-up not needed.          Vitals Value Taken Time   /94 09/22/20 1632   Temp 36.2 °C (97.2 °F) 09/22/20 1419   Pulse 72 09/22/20 1632   Resp 16 09/22/20 1630   SpO2 98 % 09/22/20 1632   Vitals shown include unvalidated device data.      No case tracking events are documented in the log.      Pain/Isai Score: Pain Rating Prior to Med Admin: 0 (9/22/2020  4:18 PM)  Pain Rating Post Med Admin: 0 (9/22/2020  4:18 PM)  Isai Score: 9 (9/22/2020  4:18 PM)    Gave home dose of norvasc and then IV labetalol 10 mg x1.  Patient OK for discharge.

## 2020-09-22 NOTE — PROGRESS NOTES
Patient takes BP medication daily and did not take this morning. Patient /105. Patient pain none to slight. Anesthesia aware. Ordered what patient takes at home, Norvasc 5 mg

## 2020-09-22 NOTE — DISCHARGE SUMMARY
[facility]  Brief Operative Note    Surgery Date: 9/22/2020     Surgeon(s) and Role:     * Whitley Rojo MD - Primary    Assisting Surgeon: None    Pre-op Diagnosis:  Old bucket handle tear of medial meniscus of left knee [M23.204]  Old tear of lateral meniscus of left knee, unspecified tear type [M23.201]  Chondromalacia of left knee [M94.262]  Osteoarthritis of left knee, unspecified osteoarthritis type [M17.12]  Plica syndrome [M67.50]    Post-op Diagnosis:  Post-Op Diagnosis Codes:     * Old bucket handle tear of medial meniscus of left knee [M23.204]     * Old tear of lateral meniscus of left knee, unspecified tear type [M23.201]     * Chondromalacia of left knee [M94.262]     * Osteoarthritis of left knee, unspecified osteoarthritis type [M17.12]     * Plica syndrome [M67.50]    Procedure(s) (LRB):  ARTHROSCOPY, KNEE, WITH MENISCECTOMY (Left)  SYNOVECTOMY, KNEE (Left)  CHONDROPLASTY, KNEE (Left)    Anesthesia: General    Description of the findings of the procedure(s):   Partial medial and lateral meniscectomy, chondroplasty     Estimated Blood Loss: * No values recorded between 9/22/2020  1:38 PM and 9/22/2020  2:14 PM *         Specimens:   Specimen (12h ago, onward)    None            Discharge Note    OUTCOME: Patient tolerated treatment/procedure well without complication and is now ready for discharge.    DISPOSITION: Home or Self Care    FINAL DIAGNOSIS:  <principal problem not specified>    FOLLOWUP: In clinic    DISCHARGE INSTRUCTIONS:    Discharge Procedure Orders   Diet general     Call MD for:  temperature >100.4     Call MD for:  persistent nausea and vomiting     Call MD for:  severe uncontrolled pain     Call MD for:  difficulty breathing, headache or visual disturbances     Call MD for:  redness, tenderness, or signs of infection (pain, swelling, redness, odor or green/yellow discharge around incision site)     Call MD for:  hives     Call MD for:  persistent dizziness or  light-headedness     No driving, operating heavy equipment or signing legal documents while taking pain medication     Remove dressing in 72 hours     Shower on day dressing removed (No bath)

## 2020-09-22 NOTE — PLAN OF CARE
Patient is AAO and VSS.  Tolerating PO and states pain is tolerable.  Dressing CDI.  Patient states they are ready for d/c.  IV removed.  Catheter tip intact.  Caregiver at bedside.  Discharge instructions reviewed and copy given to the patient and caregiver.  Questions answered.  Both verbalized understanding.  Medication delivered to bedside. . Crutches provided to patient for at home use prior to discharge.  Patient wheeled to car by RN/PCT.

## 2020-09-22 NOTE — OP NOTE
DATE OF PROCEDURE: 09/22/2020    SURGEON:  Whitley Rojo M.D    ASSISTANT: SMA Margi  The use of an assistant was medically necessary for positioning, skin retraction, and assistance with this procedure. The procedure could not be performed without the use of an assistant.   There was no qualified resident/fellow available for assistance with this procedure.    PREOPERATIVE DIAGNOSES:   left  1. knee medial and lateral meniscus tear   2. knee plica.   3. knee possible chondromalacia  4. knee synovitis  5. Knee adhesions    POSTOPERATIVE DIAGNOSES:   left  1. knee medial and lateral meniscus tear   2. knee plica.   3. knee chondromalacia  4. knee synovitis.   5. Knee adhesions    PROCEDURE PERFORMED:   left  1. knee arthroscopic chondroplasty (CPT 99930)  2. knee arthroscopic medial and lateral (CPT 48152) meniscectomy   3. knee arthroscopic partial synovectomy/debridement (CPT 91044).   4. knee arthroscopic plica excision(CPT 05912).    5. Knee arthroscopic lysis of adhesions (CPT 45721)    ANESTHESIA: General with local 0.5% ropivicaine 30ml (5mg/ml), 60 mg ketamine, 60mg toradol (2ml toradol (30mg/ml))    BLOOD LOSS: Minimal.     DRAINS: None.     TOURNIQUET TIME: None.     COMPLICATIONS: None.     CONDITION ON TRANSFER: The patient was extubated and moved to the recovery room in stable condition, with compartments soft and capillary refill less than a   second in all digits.     BRIEF INDICATION OF MEDICAL NECESSITY: The patient is a 65 y.o. year-old male who has history and physical examination findings consistent with the above. Nonoperative versus operative options were discussed. The risks and benefits were discussed with the patient. The patient acknowledged understanding and wished to proceed with operative intervention. Informed consent was obtained prior to the procedure. Details of the surgical procedure were explained, including incisions and probable rehabilitation course. The patient understands  the likely length of convalescence after surgery; and we have explained the risks, benefits, and alternatives of surgery. Reasonable expectations and potential complications were discussed and acknowledged, including but not limited to infection, bleeding, blood clots, (DVT and/or PE), nerve injury, retear, instability, continued pain and stiffness. It was also explained that there was a chance of failure which may require further management. The patient agreed and understood and wished to proceed.     EXAMINATION UNDER ANESTHESIA of the OPERATIVE left KNEE: ROM 0-135 degrees, negative Lachman, negative pivot shift, stable to varus-valgus stress testing, negative effusion.     EXAMINATION UNDER ANESTHESIA of the NON-OPERATED right KNEE: ROM 0-135 degrees, negative Lachman, negative pivot shift, stable to varus-valgus stress testing, negative effusion.     PROCEDURE IN DETAIL: The correct operative site was marked by the operative surgeon.  The patient was then taken to the operating room and placed supine on the operating room table. General anesthesia was administered by the anesthesia team. All pressure points were carefully padded and checked. Preoperative antibiotics were administered. A well-padded tourniquet was placed high on the operative thigh. Examination was begun with the above findings. The non-operative leg was then placed a well-padded well-leg briones, in a comfortable position. The operative leg was placed in an arthroscopic leg briones at the level of the tourniquet. The operative leg was prepped and draped in the usual sterile fashion. After prepping and draping, the appropriate landmarks were noted on the skin.  2cc skin and sub-cutaneous tissue was infilttrated with local anesthetic mixture superolaterally at needle insufflation site. The knee was insufflated supero-laterally with saline. 9cc skin wheal and sub-cutaneous tissue and fat pad was infilttrated with local anesthetic mixture at both  portal sites; mid-lateral followed by infero-medial portals were created, and a systematic examination of the joint revealed the following:    There was evidence of significant suprapatellar pouch adhesions and compartmentalization extending down to the medial and lateral gutters.  Using thermal device, these adhesions were taken down and lysis of adhesions was performed and synovectomy was performed as needed.  There was no evidence of any loose bodies in the medial or lateral gutters.      In the patellofemoral compartment, there was chondral damage to:  Patella 10 x 5 mm grade 2  Trochlea 10 x 25 mm grade 4  Chondroplasty was performed using arthroscopic shaver.    There was chondral damage to:  Medial femoral condyle 30 x 40 mm grade 3  Medial tibial plateau 5 x 7 mm grade 4  Chondroplasty was performed using arthroscopic shaver.    There was chondral damage to:  Lateral femoral condyle 15 x 10 mm grade 3  Lateral tibial plateau 10 x 5 mm grade 4  Chondroplasty was performed using arthroscopic shaver.    In the medial compartment there was no evidence of meniscal instability.   Posterior horn medial meniscus tear,  complex was debrided with arthroscopic shaver and biter.  50% was debrided over an area of 2 cm.  Root and hoop fibers remained intact.    Attention was then turned to the notch. The ACL and PCL were probed carefully and found to be stable.     There was a hypertrophic infrapatellar plica.  This was debrided using arthroscopic biter and shaver.    There was some scar about the ACL.  This was debrided in the standard fashion and lysis of adhesions was performed.    In the lateral compartment there was no evidence of meniscal instability.   Middle horn lateral meniscus tear,  complex was debrided with arthroscopic shaver and biter.  25% was debrided over an area of 1.5 cm.  Root and hoop fibers remained intact.    Synovitis was debrided in the knee as needed to the areas of concern in medial and lateral  compartments.      The knee and incisions were then copiously irrigated and fluid was extravasated using suction.  The arthroscopic portal incisions were closed using inverted 4-0 Monocryl suture.  5cc skin and sub-cutaneous tissue and around portals were infilttrated with local anesthetic mixture at both portal sites.  Steri-Strips were placed with Mastisol. Sterile TENS unit pads were placed which were medically necessary for pain relief. Wounds were dressed with Xeroform, 4x4s, and cast padding. NILE hose was placed on the operative leg to match that of the NILE hose placed preoperatively on the non-operative leg. Iceman was secured.  The patient was extubated and moved to the recovery room in stable condition with compartments soft and capillary refill less than a second in all digits.     POSTOPERATIVE PLAN: We will be following the arthroscopic partial meniscectomy guidelines with emphasis on patellar mobility.  This was discussed this with the patient's family after surgery.

## 2020-09-22 NOTE — DISCHARGE INSTRUCTIONS
Using Crutches: Weight-Bearing  Use a weight-bearing gait when you are told that you can put some weight on your leg as it heals. Depending on your arm strength and balance, you can either step to or step through.   Balanced standing (tripod) position  Use this position when you start or end a movement. or when youre standing for any length of time. Move your crutches in front of you about 12 inches. Find your balance. Don't rest your armpits on the pads.  Weight-bearing: Step to    · Start in a balanced standing (tripod) position.  · Step forward with your affected foot.  · Land lightly between your crutches.  · Squeeze the pads against the sides of your chest.  · Support your weight with your hands and your affected leg.  · Press down on the handgrips.  · Lift your unaffected foot and step to the crutches.  · Land on your unaffected foot, between your crutches. Keep the knee slightly bent.  · Reach forward and out with the crutches to begin the next step.  Weight-bearing: Step through    · Start in a balanced standing (tripod) position.  · Step forward with your affected foot.  · Land lightly between your crutches.  · Squeeze the pads against the sides of your chest.  · Support your weight with your hands and your affected leg.  · Press down on the handgrips.  · Lift the unaffected foot and step forward through the crutches.  · Land on the unaffected foot slightly in front of the toe of the other foot. Keep the knee slightly bent.  · Reach forward and out with the crutches to begin the next step.     You may be told to use one of the gaits listed below:  · Toe-touch or touch-down gait: Lightly touch your affected foot to the floor, and let your crutches bear most of the weight. Imagine that youre stepping on a ripe tomato: Step lightly, so it wont be squashed.  · Partial weight-bearing gait: Put some weight on your affected foot as you walk. Your healthcare provider will tell you how much.  · Full  weight-bearing gait: Put most of your weight on your affected foot. Place only a little weight on your crutches.   Date Last Reviewed: 9/3/2015  © 1482-1601 [employer]. [address] [place] [address], [place] [address]. All rights reserved. This information is not intended as a substitute for professional medical care. Always follow your healthcare professional's instructions.      PATIENT INSTRUCTIONS  POST-ANESTHESIA    IMMEDIATELY FOLLOWING SURGERY:  Do not drive or operate machinery for the first twenty four hours after surgery.  Do not make any important decisions for twenty four hours after surgery or while taking narcotic pain medications or sedatives.  If you develop intractable nausea and vomiting or a severe headache please notify your doctor immediately.    FOLLOW-UP:  Please make an appointment with your surgeon as instructed. You do not need to follow up with anesthesia unless specifically instructed to do so.    WOUND CARE INSTRUCTIONS (if applicable):  Keep a dry clean dressing on the anesthesia/puncture wound site if there is drainage.  Once the wound has quit draining you may leave it open to air.  Generally you should leave the bandage intact for twenty four hours unless there is drainage.  If the epidural site drains for more than 36-48 hours please call the anesthesia department.    QUESTIONS?:  Please feel free to call your physician or the hospital  if you have any questions, and they will be happy to assist you.       [facility] Anesthesia Department  [address] [facility]  [phone]

## 2020-09-23 NOTE — ANESTHESIA POSTPROCEDURE EVALUATION
Anesthesia Post Evaluation    Patient: Aaron Guerra    Procedure(s) Performed: Procedure(s) (LRB):  ARTHROSCOPY, KNEE, WITH MENISCECTOMY (Left)  SYNOVECTOMY, KNEE (Left)  CHONDROPLASTY, KNEE (Left)  EXCISION, PLICA, KNEE, ARTHROSCOPIC (Left)  [id number] (Left)    Final Anesthesia Type: general    Patient location during evaluation: PACU  Patient participation: Yes- Able to Participate  Level of consciousness: awake and alert and oriented  Post-procedure vital signs: reviewed and stable  Pain management: adequate  Airway patency: patent    PONV status at discharge: No PONV  Anesthetic complications: no      Cardiovascular status: hemodynamically stable  Respiratory status: unassisted, spontaneous ventilation and room air  Hydration status: euvolemic  Follow-up not needed.          Vitals Value Taken Time   /86 09/22/20 1657   Temp 36.3 °C (97.4 °F) 09/22/20 1655   Pulse 76 09/22/20 1659   Resp 18 09/22/20 1655   SpO2 98 % 09/22/20 1659   Vitals shown include unvalidated device data.      Event Time   Out of Recovery 17:00:00         Pain/Isai Score: Pain Rating Prior to Med Admin: 0 (9/22/2020  4:18 PM)  Pain Rating Post Med Admin: 0 (9/22/2020  4:18 PM)  Isai Score: 9 (9/22/2020  4:18 PM)

## 2020-10-07 ENCOUNTER — OFFICE VISIT (OUTPATIENT)
Dept: SPORTS MEDICINE | Facility: CLINIC | Age: 65
End: 2020-10-07
Payer: COMMERCIAL

## 2020-10-07 VITALS
DIASTOLIC BLOOD PRESSURE: 92 MMHG | BODY MASS INDEX: 30.22 KG/M2 | WEIGHT: 188 LBS | HEART RATE: 87 BPM | HEIGHT: 66 IN | SYSTOLIC BLOOD PRESSURE: 142 MMHG

## 2020-10-07 DIAGNOSIS — Z98.890 S/P ARTHROSCOPY OF LEFT KNEE: Primary | ICD-10-CM

## 2020-10-07 PROCEDURE — 99999 PR PBB SHADOW E&M-EST. PATIENT-LVL III: ICD-10-PCS | Mod: PBBFAC,,, | Performed by: PHYSICIAN ASSISTANT

## 2020-10-07 PROCEDURE — 99024 PR POST-OP FOLLOW-UP VISIT: ICD-10-PCS | Mod: S$GLB,,, | Performed by: PHYSICIAN ASSISTANT

## 2020-10-07 PROCEDURE — 99024 POSTOP FOLLOW-UP VISIT: CPT | Mod: S$GLB,,, | Performed by: PHYSICIAN ASSISTANT

## 2020-10-07 PROCEDURE — 99999 PR PBB SHADOW E&M-EST. PATIENT-LVL III: CPT | Mod: PBBFAC,,, | Performed by: PHYSICIAN ASSISTANT

## 2020-10-08 NOTE — PROGRESS NOTES
HISTORY OF PRESENT ILLNESS:   Pt is here today for first post-operative followup of knee arthroscopy.  he is doing well.  We have reviewed his findings and discussed plan of care and future treatment options.      He is doing well.   Pain well controlled.   Still using 1 crutch to ambulate.     Doing PT at external [facility]    DATE OF PROCEDURE: 09/22/2020     SURGEON:  Whitley Rojo M.D     PROCEDURE PERFORMED:   left  1. knee arthroscopic chondroplasty (CPT 89389)  2. knee arthroscopic medial and lateral (CPT 28919) meniscectomy   3. knee arthroscopic partial synovectomy/debridement (CPT 20322).   4. knee arthroscopic plica excision(CPT 03574).    5. Knee arthroscopic lysis of adhesions (CPT 06307)     In the patellofemoral compartment, there was chondral damage to:  Patella 10 x 5 mm grade 2  Trochlea 10 x 25 mm grade 4  Chondroplasty was performed using arthroscopic shaver.     There was chondral damage to:  Medial femoral condyle 30 x 40 mm grade 3  Medial tibial plateau 5 x 7 mm grade 4  Chondroplasty was performed using arthroscopic shaver.     There was chondral damage to:  Lateral femoral condyle 15 x 10 mm grade 3  Lateral tibial plateau 10 x 5 mm grade 4  Chondroplasty was performed using arthroscopic shaver.                                                                               PHYSICAL EXAMINATION:     Incision sites healed well  No evidence of any erythema, infection or induration  Range of motion 5-120 degrees  Minimal effusion  2+ DP pulse  No swelling, no calf tenderness  - Ronda's sign  Negative medial joint line tendernes  Moderate quad atrophy                                                                                 ASSESSMENT:                                                                                                                                               1. Status post above, doing well.                                                                                                                                PLAN:                                                                                                                                                     1. Continue with PT. Emphasized and demonstrated how to work on gaining terminal extension and increasing quad contraction. Able to get him to -3 passively.   Gave compression knee sleeve to wear during the day.     2. Emphasized quad function.  3. I have discussed return to activity in detail.  4.Patient will see us back at 5- 6 week post-op aura.                                    5. All questions were answered and patient should contact us if he  has any questions or concerns in the interim.

## 2020-10-14 ENCOUNTER — TELEPHONE (OUTPATIENT)
Dept: FAMILY MEDICINE | Facility: CLINIC | Age: 65
End: 2020-10-14

## 2020-10-14 NOTE — TELEPHONE ENCOUNTER
"Left message informing patient that Samira contacted him in regards to his office visit with Sports Medicine on 10/07/2020.     Samira states,    "The patient had an elevated on his/her last office visit.  The patient was contacted about scheduling a follow up  blood pressure nurse/PCP visit. I did leave a detailed message.     Samira CARTER  Clinical Care Coordinator  Internal Medicine  [facility]  [phone]"  "

## 2020-10-14 NOTE — TELEPHONE ENCOUNTER
----- Message from Lisa Todd sent at 10/14/2020 11:54 AM CDT -----  Name of Who is Calling: KEITH DIAMOND [[id number]]      What is the request in detail: Pt is calling to speak to staff in regards to a missed call from the office .... Please call to further assist .       Can the clinic reply by [facility]: N      What Number to [facility] Back [facility]: [phone]

## 2020-10-14 NOTE — TELEPHONE ENCOUNTER
The patient had an elevated on his/her last office visit.  The patient was contacted about scheduling a follow up  blood pressure nurse/PCP visit. I did leave a detailed message.    Samira CARTER  Clinical Care Coordinator  Internal Medicine  [facility]  [phone]

## 2020-10-15 ENCOUNTER — TELEPHONE (OUTPATIENT)
Dept: SPORTS MEDICINE | Facility: CLINIC | Age: 65
End: 2020-10-15

## 2020-10-15 DIAGNOSIS — M23.204 OLD BUCKET HANDLE TEAR OF MEDIAL MENISCUS OF LEFT KNEE: ICD-10-CM

## 2020-10-15 DIAGNOSIS — Z98.890 S/P ARTHROSCOPY OF LEFT KNEE: Primary | ICD-10-CM

## 2020-11-04 ENCOUNTER — OFFICE VISIT (OUTPATIENT)
Dept: SPORTS MEDICINE | Facility: CLINIC | Age: 65
End: 2020-11-04
Payer: COMMERCIAL

## 2020-11-04 VITALS
WEIGHT: 193 LBS | BODY MASS INDEX: 31.15 KG/M2 | SYSTOLIC BLOOD PRESSURE: 141 MMHG | DIASTOLIC BLOOD PRESSURE: 83 MMHG | HEART RATE: 90 BPM

## 2020-11-04 DIAGNOSIS — Z98.890 S/P ARTHROSCOPY OF LEFT KNEE: Primary | ICD-10-CM

## 2020-11-04 PROCEDURE — 99024 PR POST-OP FOLLOW-UP VISIT: ICD-10-PCS | Mod: S$GLB,,, | Performed by: ORTHOPAEDIC SURGERY

## 2020-11-04 PROCEDURE — 99999 PR PBB SHADOW E&M-EST. PATIENT-LVL III: ICD-10-PCS | Mod: PBBFAC,,, | Performed by: ORTHOPAEDIC SURGERY

## 2020-11-04 PROCEDURE — 99999 PR PBB SHADOW E&M-EST. PATIENT-LVL III: CPT | Mod: PBBFAC,,, | Performed by: ORTHOPAEDIC SURGERY

## 2020-11-04 PROCEDURE — 99024 POSTOP FOLLOW-UP VISIT: CPT | Mod: S$GLB,,, | Performed by: ORTHOPAEDIC SURGERY

## 2020-11-04 RX ORDER — FLUTICASONE PROPIONATE 50 MCG
1 SPRAY, SUSPENSION (ML) NASAL DAILY
COMMUNITY
End: 2021-11-29 | Stop reason: SDUPTHER

## 2020-11-04 NOTE — PROGRESS NOTES
HISTORY OF PRESENT ILLNESS:   Pt is here today for first post-operative followup of knee arthroscopy.  he is doing well.  We have reviewed his findings and discussed plan of care and future treatment options.      Has some pain, michelle w down steps    Doing PT at external [facility]    DATE OF PROCEDURE: 09/22/2020  SURGEON:  Whitley Rojo M.D  PROCEDURE PERFORMED:   left  1. knee arthroscopic chondroplasty (CPT 78446)  2. knee arthroscopic medial and lateral (CPT 88840) meniscectomy   3. knee arthroscopic partial synovectomy/debridement (CPT 04760).   4. knee arthroscopic plica excision(CPT 76181).    5. Knee arthroscopic lysis of adhesions (CPT 37240)     In the patellofemoral compartment, there was chondral damage to:  Patella 10 x 5 mm grade 2  Trochlea 10 x 25 mm grade 4  Chondroplasty was performed using arthroscopic shaver.     There was chondral damage to:  Medial femoral condyle 30 x 40 mm grade 3  Medial tibial plateau 5 x 7 mm grade 4  Chondroplasty was performed using arthroscopic shaver.     There was chondral damage to:  Lateral femoral condyle 15 x 10 mm grade 3  Lateral tibial plateau 10 x 5 mm grade 4  Chondroplasty was performed using arthroscopic shaver.                                                                               PHYSICAL EXAMINATION:     Incision sites healed well  No evidence of any erythema, infection or induration  Range of motion 5-120 degrees  Minimal effusion  2+ DP pulse  No swelling, no calf tenderness  - Ronda's sign  Negative medial joint line tendernes  Moderate quad atrophy    Xrays of bilateral knees:  Bones are well mineralized.  Alignment is satisfactory.  Slight narrowing of the medial compartment of the tibiofemoral joint space bilaterally.  Mild tri compartmental hypertrophic spurring is noted to left knee.  No fracture or dislocation.  Slight increased soft tissue density in the suprapatellar area on the right could represent a small joint effusion.   Atherosclerotic vascular calcifications are noted bilaterally.    Kellgren Sheng grade 2-3 noted to left knee.                                                                                   ASSESSMENT:                                                                                                                                               1. Status post above,                                                                                                                            PLAN:                                                                                                                                                     1. Continue with PT.  2. Emphasized quad function.  3. I have discussed return to activity in detail.  4.Patient will see us back in JAn, possible euflexxa series then.                                    5. All questions were answered and patient should contact us if he  has any questions or concerns in the interim.

## 2020-11-11 ENCOUNTER — IMMUNIZATION (OUTPATIENT)
Dept: PHARMACY | Facility: CLINIC | Age: 65
End: 2020-11-11
Payer: COMMERCIAL

## 2020-11-24 ENCOUNTER — PATIENT MESSAGE (OUTPATIENT)
Dept: FAMILY MEDICINE | Facility: CLINIC | Age: 65
End: 2020-11-24

## 2020-11-24 ENCOUNTER — NURSE TRIAGE (OUTPATIENT)
Dept: ADMINISTRATIVE | Facility: CLINIC | Age: 65
End: 2020-11-24

## 2020-11-24 DIAGNOSIS — K63.5 POLYP OF COLON, UNSPECIFIED PART OF COLON, UNSPECIFIED TYPE: Primary | ICD-10-CM

## 2020-11-24 DIAGNOSIS — Z12.11 COLON CANCER SCREENING: ICD-10-CM

## 2020-11-24 RX ORDER — HYDROCHLOROTHIAZIDE 12.5 MG/1
12.5 TABLET ORAL DAILY
Qty: 30 TABLET | Refills: 0 | Status: SHIPPED | OUTPATIENT
Start: 2020-11-24 | End: 2020-12-14 | Stop reason: SDUPTHER

## 2020-11-24 NOTE — TELEPHONE ENCOUNTER
Bp 169/96 today yesterday it was 180/90. Lightheadedness when trying to stand. Pt stated it's a small amount. Went to therapy yesterday and got a little light headed and it has not completed gone away. Care advice recommend patient recieves a call from Md office. Please call and advise. Pt is awaiting a return call. Pt would like to come in to see Md within the next 3 days as well. Pt is awaiting a return call [phone]    Reason for Disposition   Taking BP medications and feels is having side effects (e.g., impotence, cough, dizziness)    Additional Information   Negative: Sounds like a life-threatening emergency to the triager   Negative: Pregnant > 20 weeks or postpartum (< 6 weeks after delivery) and new hand or face swelling   Negative: Pregnant > 20 weeks and BP > 140/90   Negative: Systolic BP >= 160 OR Diastolic >= 100, and any cardiac or neurologic symptoms (e.g., chest pain, difficulty breathing, unsteady gait, blurred vision)   Negative: Patient sounds very sick or weak to the triager   Negative: BP Systolic BP >= 140 OR Diastolic >= 90 and postpartum (from 0 to 6 weeks after delivery)   Negative: Systolic BP >= 180 OR Diastolic >= 110, and missed most recent dose of blood pressure medication   Negative: Systolic BP >= 180 OR Diastolic >= 110   Negative: Patient wants to be seen   Negative: Ran out of BP medications    Protocols used: HIGH BLOOD PRESSURE-A-OH

## 2020-11-24 NOTE — TELEPHONE ENCOUNTER
Please contact patient by phone today.  Schedule appointment to see me or Mandie within the next few weeks.  In the interim, I will add low-dose HCTZ to his medication regimen.  He should continue to monitor his blood pressure at home.

## 2020-11-24 NOTE — TELEPHONE ENCOUNTER
Spoke with the patient to discuss Dr. Villalta's message. Patient has been scheduled for BP follow up with Mandie.

## 2020-11-25 NOTE — TELEPHONE ENCOUNTER
If you would like him to have his colonoscopy, please enter order and we will provide the patient with the phone number to call and schedule his test before the 1st of the year. Thanks

## 2020-11-27 ENCOUNTER — TELEPHONE (OUTPATIENT)
Dept: FAMILY MEDICINE | Facility: CLINIC | Age: 65
End: 2020-11-27

## 2020-11-27 DIAGNOSIS — Z01.818 PRE-OP TESTING: ICD-10-CM

## 2020-11-27 DIAGNOSIS — Z12.11 SPECIAL SCREENING FOR MALIGNANT NEOPLASMS, COLON: Primary | ICD-10-CM

## 2020-11-27 RX ORDER — SODIUM, POTASSIUM,MAG SULFATES 17.5-3.13G
1 SOLUTION, RECONSTITUTED, ORAL ORAL DAILY
Qty: 1 KIT | Refills: 0 | Status: SHIPPED | OUTPATIENT
Start: 2020-11-27 | End: 2020-11-29

## 2020-11-27 NOTE — TELEPHONE ENCOUNTER
----- Message from Ferny Zhu sent at 11/27/2020  9:56 AM CST -----   Name of Who is Calling:     What is the request in detail: patient request call back in reference to   colonoscopy  questions/concerns   Please contact to further discuss and advise      Can the clinic reply by [facility]: no     What Number to Call Back if not in [facility]:  [phone]

## 2020-12-01 ENCOUNTER — PATIENT OUTREACH (OUTPATIENT)
Dept: FAMILY MEDICINE | Facility: CLINIC | Age: 65
End: 2020-12-01

## 2020-12-10 PROBLEM — M23.204 OLD BUCKET HANDLE TEAR OF MEDIAL MENISCUS OF LEFT KNEE: Status: RESOLVED | Noted: 2020-09-22 | Resolved: 2020-12-10

## 2020-12-10 NOTE — PROGRESS NOTES
Subjective:       Patient ID: Aaron Guerra is a 65 y.o. male.    Chief Complaint: Hypertension    Hypertension  This is a chronic problem. The problem is controlled. Pertinent negatives include no chest pain, headaches, palpitations or shortness of breath. There are no associated agents to hypertension. Risk factors for coronary artery disease include male gender and dyslipidemia. Past treatments include calcium channel blockers and diuretics. There are no compliance problems.  There is no history of kidney disease, CAD/MI or CVA.       Patient Active Problem List   Diagnosis    Hypertension    Colon polyp    Allergic rhinitis    Benign prostatic hyperplasia with lower urinary tract symptoms    Hypercholesteremia    ANGELIC (obstructive sleep apnea)    [place] cardiac risk 10-20% in next 10 years    Primary osteoarthritis of left shoulder    DJD (degenerative joint disease), cervical    Elevated PSA    Beta thalassemia minor    Pulmonary nodule    Old bucket handle tear of lateral meniscus of right knee    Old bucket handle tear of medial meniscus of right knee         Current Outpatient Medications:     amLODIPine (NORVASC) 5 MG tablet, Take 1 tablet (5 mg total) by mouth once daily., Disp: 30 tablet, Rfl: 2    ascorbate calcium (VITAMIN C ORAL), Take 1 tablet by mouth once daily., Disp: , Rfl:     aspirin (ECOTRIN) 81 MG EC tablet, Take 81 mg by mouth once daily., Disp: , Rfl:     atorvastatin (LIPITOR) 40 MG tablet, Take 1 tablet (40 mg total) by mouth once daily., Disp: 30 tablet, Rfl: 2    fluticasone propionate (FLONASE) 50 mcg/actuation nasal spray, 1 spray by Each Nostril route once daily., Disp: , Rfl:     hydroCHLOROthiazide (HYDRODIURIL) 12.5 MG Tab, Take 1 tablet (12.5 mg total) by mouth once daily., Disp: 30 tablet, Rfl: 0    ketoconazole (NIZORAL) 2 % shampoo, Apply topically twice a week., Disp: 120 mL, Rfl: 1    HYDROcodone-acetaminophen (NORCO)  mg per tablet, Take 1 tablet  by mouth every 4-6 hours for pain., Disp: 15 tablet, Rfl: 0    promethazine (PHENERGAN) 25 MG tablet, Take 1 tablet (25 mg total) by mouth every 6 (six) hours as needed for Nausea., Disp: 8 tablet, Rfl: 0    vitamin E acetate (VITAMIN E ORAL), Take 1 tablet by mouth once daily., Disp: , Rfl:     The following portions of the patient's history were reviewed and updated as appropriate: allergies, past family history, past medical history, past social history and past surgical history.    Review of Systems   Constitutional: Negative for fatigue and unexpected weight change.   HENT: Negative for ear discharge, ear pain, hearing loss, tinnitus and voice change.    Eyes: Negative for pain.   Respiratory: Negative for cough and shortness of breath.    Cardiovascular: Negative for chest pain, palpitations and leg swelling.   Gastrointestinal: Negative for abdominal pain, blood in stool, constipation, diarrhea, nausea and vomiting.        Heartburn, no GERD   Genitourinary: Negative for decreased urine volume, difficulty urinating, dysuria, enuresis, frequency, hematuria and urgency.        Nocturia 4-5x/night   Musculoskeletal: Negative for arthralgias, back pain and myalgias.   Skin: Negative for rash.   Neurological: Negative for dizziness, weakness, light-headedness and headaches.   Hematological: Does not bruise/bleed easily.   Psychiatric/Behavioral: Negative for dysphoric mood and sleep disturbance. The patient is not nervous/anxious.        Component Date Value Ref Range    WBC 09/10/2020 4.79  3.90 - 12.70 K/uL    RBC 09/10/2020 5.25  4.60 - 6.20 M/uL    Hemoglobin 09/10/2020 11.7* 14.0 - 18.0 g/dL    Hematocrit 09/10/2020 38.9* 40.0 - 54.0 %    MCV 09/10/2020 74* 82 - 98 fL    MCH 09/10/2020 22.3* 27.0 - 31.0 pg    MCHC 09/10/2020 30.1* 32.0 - 36.0 g/dL    RDW 09/10/2020 16.3* 11.5 - 14.5 %    Platelets 09/10/2020 277  150 - 350 K/uL    MPV 09/10/2020 10.1  9.2 - 12.9 fL    Immature Granulocytes  "09/10/2020 0.4  0.0 - 0.5 %    Gran # (ANC) 09/10/2020 2.0  1.8 - 7.7 K/uL    Immature Grans (Abs) 09/10/2020 0.02  0.00 - 0.04 K/uL    Lymph # 09/10/2020 2.1  1.0 - 4.8 K/uL    Mono # 09/10/2020 0.5  0.3 - 1.0 K/uL    Eos # 09/10/2020 0.2  0.0 - 0.5 K/uL    Baso # 09/10/2020 0.03  0.00 - 0.20 K/uL    nRBC 09/10/2020 0  0 /100 WBC    Gran % 09/10/2020 42.2  38.0 - 73.0 %    Lymph % 09/10/2020 43.0  18.0 - 48.0 %    Mono % 09/10/2020 9.6  4.0 - 15.0 %    Eosinophil % 09/10/2020 4.2  0.0 - 8.0 %    Basophil % 09/10/2020 0.6  0.0 - 1.9 %    Cholesterol 09/10/2020 159  120 - 199 mg/dL    Triglycerides 09/10/2020 76  30 - 150 mg/dL    HDL 09/10/2020 45  40 - 75 mg/dL    LDL Cholesterol 09/10/2020 98.8  63.0 - 159.0 mg/dL    HDL/Cholesterol Ratio 09/10/2020 28.3  20.0 - 50.0 %    Total Cholesterol/HDL Ratio 09/10/2020 3.5  2.0 - 5.0    Non-HDL Cholesterol 09/10/2020 114  mg/dL    Sodium 09/10/2020 138  136 - 145 mmol/L    Potassium 09/10/2020 3.9  3.5 - 5.1 mmol/L    Chloride 09/10/2020 107  95 - 110 mmol/L    CO2 09/10/2020 26  23 - 29 mmol/L    Glucose 09/10/2020 101  70 - 110 mg/dL    BUN 09/10/2020 15  8 - 23 mg/dL    Creatinine 09/10/2020 1.1  0.5 - 1.4 mg/dL    Calcium 09/10/2020 8.2* 8.7 - 10.5 mg/dL    Total Protein 09/10/2020 6.9  6.0 - 8.4 g/dL    Albumin 09/10/2020 3.6  3.5 - 5.2 g/dL    Total Bilirubin 09/10/2020 0.4  0.1 - 1.0 mg/dL    Alkaline Phosphatase 09/10/2020 89  55 - 135 U/L    AST 09/10/2020 23  10 - 40 U/L    ALT 09/10/2020 27  10 - 44 U/L    Anion Gap 09/10/2020 5* 8 - 16 mmol/L    eGFR if African American 09/10/2020 >60.0  >60 mL/min/1.73 m^2    eGFR if non African American 09/10/2020 >60.0  >60 mL/min/1.73 m^2   Component Date Value Ref Range    PSA Total 08/19/2020 5.4* 0.00 - 4.00 ng/mL    PSA, Free 08/19/2020 1.54* 0.01 - 1.50 ng/mL    PSA, Free % 08/19/2020 28.52  Not established %         Objective:      /80   Pulse 94   Ht 5' 6" (1.676 m)   " Wt 87.8 kg (193 lb 8 oz)   SpO2 96%   BMI 31.23 kg/m²     Physical Exam  Vitals signs reviewed.   Constitutional:       Appearance: He is well-developed.   HENT:      Head: Normocephalic and atraumatic.      Right Ear: External ear normal.      Left Ear: External ear normal.      Nose: Nose normal.      Mouth/Throat:      Pharynx: No oropharyngeal exudate.   Eyes:      General: No scleral icterus.     Conjunctiva/sclera: Conjunctivae normal.   Neck:      Musculoskeletal: Neck supple.      Thyroid: No thyromegaly.      Vascular: No carotid bruit or JVD.   Cardiovascular:      Rate and Rhythm: Normal rate and regular rhythm.      Pulses: Normal pulses.      Heart sounds: Normal heart sounds. No murmur. No friction rub. No gallop.    Pulmonary:      Effort: Pulmonary effort is normal.      Breath sounds: Normal breath sounds. No wheezing, rhonchi or rales.   Abdominal:      General: Bowel sounds are normal. There is no distension.      Palpations: Abdomen is soft. There is no hepatomegaly, splenomegaly or mass.      Tenderness: There is no abdominal tenderness.   Musculoskeletal: Normal range of motion.         General: No tenderness.   Lymphadenopathy:      Cervical: No cervical adenopathy.   Skin:     General: Skin is warm and dry.   Neurological:      Mental Status: He is alert and oriented to person, place, and time.      Cranial Nerves: No cranial nerve deficit.      Sensory: No sensory deficit.      Coordination: Coordination normal.      Deep Tendon Reflexes: Reflexes are normal and symmetric.   Psychiatric:         Behavior: Behavior is cooperative.           Assessment:       1. Hypertension, unspecified type    2. Hypercholesteremia    3. Benign prostatic hyperplasia with nocturia    4. Elevated PSA    5. Polyp of colon, unspecified part of colon, unspecified type    6. Colon cancer screening          Plan:       Discussed HIV screening.  Scheduled for repeat colonoscopy, given prior colon polyp in  "2015.  Will discuss urinary symtpoms with Dr. Weathers for BPH/elevated PSA.  Remainder of Health Maintenance reviewed - up to date.    Continue present medicaiton regimen.  RTC 6 months.    "This note will not be shared with the patient."    "

## 2020-12-14 ENCOUNTER — OFFICE VISIT (OUTPATIENT)
Dept: FAMILY MEDICINE | Facility: CLINIC | Age: 65
End: 2020-12-14
Attending: FAMILY MEDICINE
Payer: COMMERCIAL

## 2020-12-14 ENCOUNTER — LAB VISIT (OUTPATIENT)
Dept: PRIMARY CARE CLINIC | Facility: CLINIC | Age: 65
End: 2020-12-14
Payer: COMMERCIAL

## 2020-12-14 VITALS
BODY MASS INDEX: 31.1 KG/M2 | HEART RATE: 94 BPM | OXYGEN SATURATION: 96 % | SYSTOLIC BLOOD PRESSURE: 132 MMHG | DIASTOLIC BLOOD PRESSURE: 80 MMHG | WEIGHT: 193.5 LBS | HEIGHT: 66 IN

## 2020-12-14 DIAGNOSIS — N40.1 BENIGN PROSTATIC HYPERPLASIA WITH NOCTURIA: ICD-10-CM

## 2020-12-14 DIAGNOSIS — I10 HYPERTENSION, UNSPECIFIED TYPE: Primary | ICD-10-CM

## 2020-12-14 DIAGNOSIS — K63.5 POLYP OF COLON, UNSPECIFIED PART OF COLON, UNSPECIFIED TYPE: ICD-10-CM

## 2020-12-14 DIAGNOSIS — R97.20 ELEVATED PSA: ICD-10-CM

## 2020-12-14 DIAGNOSIS — Z01.818 PRE-OP TESTING: ICD-10-CM

## 2020-12-14 DIAGNOSIS — R35.1 BENIGN PROSTATIC HYPERPLASIA WITH NOCTURIA: ICD-10-CM

## 2020-12-14 DIAGNOSIS — Z12.11 COLON CANCER SCREENING: ICD-10-CM

## 2020-12-14 DIAGNOSIS — E78.00 HYPERCHOLESTEREMIA: ICD-10-CM

## 2020-12-14 LAB — SARS-COV-2 RNA RESP QL NAA+PROBE: NOT DETECTED

## 2020-12-14 PROCEDURE — 99999 PR PBB SHADOW E&M-EST. PATIENT-LVL III: CPT | Mod: PBBFAC,,, | Performed by: FAMILY MEDICINE

## 2020-12-14 PROCEDURE — 99214 OFFICE O/P EST MOD 30 MIN: CPT | Mod: S$GLB,,, | Performed by: FAMILY MEDICINE

## 2020-12-14 PROCEDURE — 3075F PR MOST RECENT SYSTOLIC BLOOD PRESS GE 130-139MM HG: ICD-10-PCS | Mod: CPTII,S$GLB,, | Performed by: FAMILY MEDICINE

## 2020-12-14 PROCEDURE — 3079F DIAST BP 80-89 MM HG: CPT | Mod: CPTII,S$GLB,, | Performed by: FAMILY MEDICINE

## 2020-12-14 PROCEDURE — 1126F AMNT PAIN NOTED NONE PRSNT: CPT | Mod: S$GLB,,, | Performed by: FAMILY MEDICINE

## 2020-12-14 PROCEDURE — 3079F PR MOST RECENT DIASTOLIC BLOOD PRESSURE 80-89 MM HG: ICD-10-PCS | Mod: CPTII,S$GLB,, | Performed by: FAMILY MEDICINE

## 2020-12-14 PROCEDURE — U0003 INFECTIOUS AGENT DETECTION BY NUCLEIC ACID (DNA OR RNA); SEVERE ACUTE RESPIRATORY SYNDROME CORONAVIRUS 2 (SARS-COV-2) (CORONAVIRUS DISEASE [COVID-19]), AMPLIFIED PROBE TECHNIQUE, MAKING USE OF HIGH THROUGHPUT TECHNOLOGIES AS DESCRIBED BY CMS-2020-01-R: HCPCS

## 2020-12-14 PROCEDURE — 1126F PR PAIN SEVERITY QUANTIFIED, NO PAIN PRESENT: ICD-10-PCS | Mod: S$GLB,,, | Performed by: FAMILY MEDICINE

## 2020-12-14 PROCEDURE — 3075F SYST BP GE 130 - 139MM HG: CPT | Mod: CPTII,S$GLB,, | Performed by: FAMILY MEDICINE

## 2020-12-14 PROCEDURE — 99999 PR PBB SHADOW E&M-EST. PATIENT-LVL III: ICD-10-PCS | Mod: PBBFAC,,, | Performed by: FAMILY MEDICINE

## 2020-12-14 PROCEDURE — 99214 PR OFFICE/OUTPT VISIT, EST, LEVL IV, 30-39 MIN: ICD-10-PCS | Mod: S$GLB,,, | Performed by: FAMILY MEDICINE

## 2020-12-14 PROCEDURE — 3008F PR BODY MASS INDEX (BMI) DOCUMENTED: ICD-10-PCS | Mod: CPTII,S$GLB,, | Performed by: FAMILY MEDICINE

## 2020-12-14 PROCEDURE — 3008F BODY MASS INDEX DOCD: CPT | Mod: CPTII,S$GLB,, | Performed by: FAMILY MEDICINE

## 2020-12-14 RX ORDER — HYDROCHLOROTHIAZIDE 12.5 MG/1
12.5 TABLET ORAL DAILY
Qty: 90 TABLET | Refills: 1 | Status: SHIPPED | OUTPATIENT
Start: 2020-12-14 | End: 2021-04-05 | Stop reason: SDUPTHER

## 2020-12-14 RX ORDER — TAMSULOSIN HYDROCHLORIDE 0.4 MG/1
0.4 CAPSULE ORAL DAILY
Qty: 30 CAPSULE | Refills: 2 | Status: SHIPPED | OUTPATIENT
Start: 2020-12-14 | End: 2021-01-20 | Stop reason: SDUPTHER

## 2020-12-14 NOTE — Clinical Note
Dr. Weathers:  Mr. Guerra still has nocturia 5-4x/night.  Has UroLift 4/2018.  Any concerns with restarting tamsulosin?  Lincoln Villalta MD

## 2020-12-14 NOTE — Clinical Note
Please inform patient that Dr. Weathers agrees with 3 adding tamsulosin the patient's regimen to help with urinary symptoms.  Follow-up by phone/email in 3-4 weeks.

## 2020-12-14 NOTE — PROGRESS NOTES
I corresponded with Dr. Weathers after the patient's visit.  He agreed with restarting tamsulosin given patient's persistent urinary symptoms.  Prescription sent to pharmacy.

## 2020-12-15 ENCOUNTER — TELEPHONE (OUTPATIENT)
Dept: FAMILY MEDICINE | Facility: CLINIC | Age: 65
End: 2020-12-15

## 2020-12-15 NOTE — TELEPHONE ENCOUNTER
lvm to inform pt that dr Weathers does rec adding Tamsoulin to his regimen for his regimen symp. and dr AKHTAR wants him to follow up in 3w with an update

## 2020-12-15 NOTE — TELEPHONE ENCOUNTER
----- Message from Lincoln Villalta Jr., MD sent at 12/14/2020  5:50 PM CST -----  Please inform patient that Dr. Weathers agrees with 3 adding tamsulosin the patient's regimen to help with urinary symptoms.Follow-up by phone/email in 3-4 weeks.

## 2020-12-16 ENCOUNTER — PATIENT MESSAGE (OUTPATIENT)
Dept: ENDOSCOPY | Facility: HOSPITAL | Age: 65
End: 2020-12-16

## 2020-12-16 DIAGNOSIS — Z01.818 PRE-OP TESTING: ICD-10-CM

## 2020-12-19 ENCOUNTER — LAB VISIT (OUTPATIENT)
Dept: INTERNAL MEDICINE | Facility: CLINIC | Age: 65
End: 2020-12-19
Payer: COMMERCIAL

## 2020-12-19 DIAGNOSIS — Z01.818 PRE-OP TESTING: ICD-10-CM

## 2020-12-19 PROCEDURE — U0003 INFECTIOUS AGENT DETECTION BY NUCLEIC ACID (DNA OR RNA); SEVERE ACUTE RESPIRATORY SYNDROME CORONAVIRUS 2 (SARS-COV-2) (CORONAVIRUS DISEASE [COVID-19]), AMPLIFIED PROBE TECHNIQUE, MAKING USE OF HIGH THROUGHPUT TECHNOLOGIES AS DESCRIBED BY CMS-2020-01-R: HCPCS

## 2020-12-20 LAB — SARS-COV-2 RNA RESP QL NAA+PROBE: NOT DETECTED

## 2020-12-22 ENCOUNTER — ANESTHESIA (OUTPATIENT)
Dept: ENDOSCOPY | Facility: HOSPITAL | Age: 65
End: 2020-12-22
Payer: COMMERCIAL

## 2020-12-22 ENCOUNTER — ANESTHESIA EVENT (OUTPATIENT)
Dept: ENDOSCOPY | Facility: HOSPITAL | Age: 65
End: 2020-12-22
Payer: COMMERCIAL

## 2020-12-22 ENCOUNTER — HOSPITAL ENCOUNTER (OUTPATIENT)
Facility: HOSPITAL | Age: 65
Discharge: HOME | End: 2020-12-22
Attending: INTERNAL MEDICINE | Admitting: INTERNAL MEDICINE
Payer: COMMERCIAL

## 2020-12-22 VITALS
BODY MASS INDEX: 30.7 KG/M2 | TEMPERATURE: 98 F | RESPIRATION RATE: 16 BRPM | SYSTOLIC BLOOD PRESSURE: 125 MMHG | OXYGEN SATURATION: 97 % | HEIGHT: 66 IN | HEART RATE: 79 BPM | DIASTOLIC BLOOD PRESSURE: 88 MMHG | WEIGHT: 191 LBS

## 2020-12-22 DIAGNOSIS — Z86.010 HISTORY OF COLON POLYPS: ICD-10-CM

## 2020-12-22 PROBLEM — Z86.0100 HISTORY OF COLON POLYPS: Status: ACTIVE | Noted: 2020-12-22

## 2020-12-22 PROCEDURE — G0105 COLORECTAL SCRN; HI RISK IND: HCPCS | Mod: ,,, | Performed by: INTERNAL MEDICINE

## 2020-12-22 PROCEDURE — E9220 PRA ENDO ANESTHESIA: ICD-10-PCS | Mod: ,,, | Performed by: NURSE ANESTHETIST, CERTIFIED REGISTERED

## 2020-12-22 PROCEDURE — 37000008 HC ANESTHESIA 1ST 15 MINUTES: Performed by: INTERNAL MEDICINE

## 2020-12-22 PROCEDURE — 63600175 PHARM REV CODE 636 W HCPCS: Performed by: NURSE ANESTHETIST, CERTIFIED REGISTERED

## 2020-12-22 PROCEDURE — 25000003 PHARM REV CODE 250: Performed by: INTERNAL MEDICINE

## 2020-12-22 PROCEDURE — 37000009 HC ANESTHESIA EA ADD 15 MINS: Performed by: INTERNAL MEDICINE

## 2020-12-22 PROCEDURE — G0105 COLORECTAL SCRN; HI RISK IND: HCPCS | Performed by: INTERNAL MEDICINE

## 2020-12-22 PROCEDURE — E9220 PRA ENDO ANESTHESIA: HCPCS | Mod: ,,, | Performed by: NURSE ANESTHETIST, CERTIFIED REGISTERED

## 2020-12-22 PROCEDURE — G0105 COLORECTAL SCRN; HI RISK IND: ICD-10-PCS | Mod: ,,, | Performed by: INTERNAL MEDICINE

## 2020-12-22 RX ORDER — PROPOFOL 10 MG/ML
VIAL (ML) INTRAVENOUS CONTINUOUS PRN
Status: DISCONTINUED | OUTPATIENT
Start: 2020-12-22 | End: 2020-12-22

## 2020-12-22 RX ORDER — LIDOCAINE HCL/PF 100 MG/5ML
SYRINGE (ML) INTRAVENOUS
Status: DISCONTINUED | OUTPATIENT
Start: 2020-12-22 | End: 2020-12-22

## 2020-12-22 RX ORDER — PROPOFOL 10 MG/ML
VIAL (ML) INTRAVENOUS
Status: DISCONTINUED | OUTPATIENT
Start: 2020-12-22 | End: 2020-12-22

## 2020-12-22 RX ORDER — SODIUM CHLORIDE 9 MG/ML
INJECTION, SOLUTION INTRAVENOUS CONTINUOUS
Status: DISCONTINUED | OUTPATIENT
Start: 2020-12-22 | End: 2020-12-22 | Stop reason: HOSPADM

## 2020-12-22 RX ADMIN — PROPOFOL 20 MG: 10 INJECTION, EMULSION INTRAVENOUS at 12:12

## 2020-12-22 RX ADMIN — PROPOFOL 150 MCG/KG/MIN: 10 INJECTION, EMULSION INTRAVENOUS at 12:12

## 2020-12-22 RX ADMIN — Medication 60 MG: at 12:12

## 2020-12-22 RX ADMIN — PROPOFOL 10 MG: 10 INJECTION, EMULSION INTRAVENOUS at 12:12

## 2020-12-22 RX ADMIN — SODIUM CHLORIDE 10 ML/HR: 0.9 INJECTION, SOLUTION INTRAVENOUS at 11:12

## 2020-12-22 RX ADMIN — PROPOFOL 70 MG: 10 INJECTION, EMULSION INTRAVENOUS at 12:12

## 2020-12-22 NOTE — TRANSFER OF CARE
"Anesthesia Transfer of Care Note    Patient: Aaron Guerra    Procedure(s) Performed: Procedure(s) (LRB):  COLONOSCOPY (N/A)    Patient location: PACU    Anesthesia Type: general    Transport from OR: Transported from OR on room air with adequate spontaneous ventilation    Post pain: adequate analgesia    Post assessment: no apparent anesthetic complications and tolerated procedure well    Post vital signs: stable    Level of consciousness: awake, alert and oriented    Nausea/Vomiting: no nausea/vomiting    Complications: none    Transfer of care protocol was followed      Last vitals:   Visit Vitals  /70 (BP Location: Left arm, Patient Position: Lying)   Pulse 96   Temp 36.9 °C (98.4 °F)   Resp 14   Ht 5' 6" (1.676 m)   Wt 86.6 kg (191 lb)   SpO2 98%   BMI 30.83 kg/m²     "

## 2020-12-22 NOTE — DISCHARGE INSTRUCTIONS

## 2020-12-22 NOTE — ANESTHESIA POSTPROCEDURE EVALUATION
Anesthesia Post Evaluation    Patient: Aaron Guerra    Procedure(s) Performed: Procedure(s) (LRB):  COLONOSCOPY (N/A)    Final Anesthesia Type: general      Patient location during evaluation: PACU  Patient participation: Yes- Able to Participate  Level of consciousness: awake and alert and oriented  Post-procedure vital signs: reviewed and stable  Pain management: adequate  Airway patency: patent    PONV status at discharge: No PONV  Anesthetic complications: no      Cardiovascular status: hemodynamically stable  Respiratory status: nasal cannula  Hydration status: euvolemic  Follow-up not needed.          Vitals Value Taken Time   /88 12/22/20 1256   Temp 98 12/22/20 1606   Pulse 79 12/22/20 1256   Resp 16 12/22/20 1256   SpO2 97 % 12/22/20 1256         Event Time   Out of Recovery 13:07:00         Pain/Isai Score: Isai Score: 10 (12/22/2020 12:41 PM)

## 2020-12-22 NOTE — TRANSFER OF CARE
"Anesthesia Transfer of Care Note    Patient: Aaron Guerra    Procedure(s) Performed: Procedure(s) (LRB):  COLONOSCOPY (N/A)    Patient location: PACU    Anesthesia Type: general    Transport from OR: Transported from OR on room air with adequate spontaneous ventilation    Post pain: adequate analgesia    Post assessment: no apparent anesthetic complications and tolerated procedure well    Post vital signs: stable    Level of consciousness: sedated    Nausea/Vomiting: no nausea/vomiting    Complications: none    Transfer of care protocol was followed      Last vitals:   Visit Vitals  /70 (BP Location: Left arm, Patient Position: Lying)   Pulse 96   Temp 36.9 °C (98.4 °F)   Resp 14   Ht 5' 6" (1.676 m)   Wt 86.6 kg (191 lb)   SpO2 98%   BMI 30.83 kg/m²     "

## 2020-12-22 NOTE — ANESTHESIA PREPROCEDURE EVALUATION
12/22/2020  Aaron Guerra is a 65 y.o., male.    Anesthesia Evaluation    I have reviewed the Patient Summary Reports.   I have reviewed the NPO Status.   I have reviewed the Medications.     Review of Systems  Hematology/Oncology:  Hematology Normal   Oncology Normal     EENT/Dental:EENT/Dental Normal   Cardiovascular:   Hypertension    Renal/:  Renal/ Normal     Hepatic/GI:  Hepatic/GI Normal    Musculoskeletal:   Arthritis     Neurological:  Neurology Normal    Endocrine:  Endocrine Normal    Dermatological:  Skin Normal    Psych:  Psychiatric Normal           Physical Exam  General:  Well nourished    Airway/Jaw/Neck:  Airway Findings: Mouth Opening: Normal Tongue: Normal  General Airway Assessment: Adult  Mallampati: I  TM Distance: Normal, at least 6 cm  Jaw/Neck Findings:  Neck ROM: Normal ROM     Eyes/Ears/Nose:  EYES/EARS/NOSE FINDINGS: Normal   Dental:  Dental Findings: In tact   Chest/Lungs:  Chest/Lungs Clear    Heart/Vascular:  Heart Findings: Normal Heart murmur: negative Vascular Findings: Normal    Abdomen:  Abdomen Findings: Normal    Musculoskeletal:  Musculoskeletal Findings: Normal   Skin:  Skin Findings: Normal    Mental Status:  Mental Status Findings: Normal        Anesthesia Plan  Type of Anesthesia, risks & benefits discussed:  Anesthesia Type:  general  Patient's Preference:   Intra-op Monitoring Plan: standard ASA monitors  Intra-op Monitoring Plan Comments:   Post Op Pain Control Plan: multimodal analgesia  Post Op Pain Control Plan Comments:   Induction:   IV  Beta Blocker:  Patient is not currently on a Beta-Blocker (No further documentation required).       Informed Consent: Patient understands risks and agrees with Anesthesia plan.  Questions answered. Anesthesia consent signed with patient.  ASA Score: 2     Day of Surgery Review of History & Physical:    H&P update referred  to the provider.         Ready For Surgery From Anesthesia Perspective.

## 2020-12-22 NOTE — H&P
Short Stay Endoscopy History and Physical    PCP - Lincoln Villalta Jr, MD    Procedure - Colonoscopy  ASA - 2  Mallampati - per anesthesia  History of Anesthesia problems - no  Family history Anesthesia problems -  no     HPI:  This is a 65 y.o. male here for evaluation of :     Average Risk Screening: No  High risk screening: yes  History of polyps: yes  Anemia: No  Blood in stools: No  Diarrhea: No  Abdominal Pain: No    Review of Systems:  CONSTITUTIONAL: Denies weight change,  fatigue, fevers, chills, night sweats.  CARDIOVASCULAR: Denies chest pain, shortness of breath, orthopnea and edema.  RESPIRATORY: Denies cough, hemoptysis, dyspnea, and wheezing.  GI: See HPI.    Medical History:  Past Medical History:   Diagnosis Date    Arthritis     BPH (benign prostatic hypertrophy)     Hypertension 2012    Sleep apnea        Surgical History:   Past Surgical History:   Procedure Laterality Date    CHONDROPLASTY OF KNEE Left 9/22/2020    Procedure: CHONDROPLASTY, KNEE;  Surgeon: Whitley Rojo MD;  [facility];  Service: Orthopedics;  Laterality: Left;    COLONOSCOPY N/A 12/1/2015    Procedure: COLONOSCOPY;  Surgeon: Meagan Hill MD;  Location: [address];  Service: Endoscopy;  Laterality: N/A;    CYSTOSCOPY WITH INSERTION OF MINIMALLY INVASIVE IMPLANT TO ENLARGE PROSTATIC URETHRA N/A 8/15/2018    Procedure: TRANSPROSTATIC TISSUE RETRACTION;  Surgeon: Trenton Weathers MD;  Location: [facility];  Service: Urology;  Laterality: N/A;    KNEE ARTHROSCOPY W/ MENISCECTOMY Left 9/22/2020    Procedure: ARTHROSCOPY, KNEE, WITH MENISCECTOMY;  Surgeon: Whitley Rojo MD;  Location: [facility] OR;  Service: Orthopedics;  Laterality: Left;    KNEE ARTHROSCOPY W/ PLICA EXCISION Left 9/22/2020    Procedure: EXCISION, PLICA, KNEE, ARTHROSCOPIC;  Surgeon: Whitley Rojo MD;  Location: [facility] OR;  Service: Orthopedics;  Laterality: Left;    SYNOVECTOMY OF KNEE Left 9/22/2020    Procedure: SYNOVECTOMY, KNEE;  Surgeon: Whitley  MD Alia;  Location: [facility];  Service: Orthopedics;  Laterality: Left;    TONSILLECTOMY         Family History:   Family History   Problem Relation Age of Onset    Cataracts Mother     Diabetes Mother     Hypertension Mother     Stroke Mother     Heart attack Sister     Drug abuse Brother     Hypertension Brother     Diabetes Sister     Hypertension Brother     Hypertension Sister        Social History:   Social History     Tobacco Use    Smoking status: Never Smoker    Smokeless tobacco: Never Used   Substance Use Topics    Alcohol use: No    Drug use: No       Allergies: Reviewed.    Medications:  No current facility-administered medications on file prior to encounter.      Current Outpatient Medications on File Prior to Encounter   Medication Sig Dispense Refill    amLODIPine (NORVASC) 5 MG tablet Take 1 tablet (5 mg total) by mouth once daily. 30 tablet 2    ascorbate calcium (VITAMIN C ORAL) Take 1 tablet by mouth once daily.      aspirin (ECOTRIN) 81 MG EC tablet Take 81 mg by mouth once daily.      atorvastatin (LIPITOR) 40 MG tablet Take 1 tablet (40 mg total) by mouth once daily. 30 tablet 2    fluticasone propionate (FLONASE) 50 mcg/actuation nasal spray 1 spray by Each Nostril route once daily.      vitamin E acetate (VITAMIN E ORAL) Take 1 tablet by mouth once daily.      HYDROcodone-acetaminophen (NORCO)  mg per tablet Take 1 tablet by mouth every 4-6 hours for pain. 15 tablet 0    ketoconazole (NIZORAL) 2 % shampoo Apply topically twice a week. 120 mL 1    promethazine (PHENERGAN) 25 MG tablet Take 1 tablet (25 mg total) by mouth every 6 (six) hours as needed for Nausea. 8 tablet 0       Physical Exam:  Vital Signs:   Vitals:    12/22/20 1122   BP: 130/85   Pulse: 89   Resp: 15   Temp: 98.4 °F (36.9 °C)     General Appearance: Well appearing in no acute distress  ENT: OP clear  Chest: CTA B  CV: RRR, no m/r/g  Abd: s/nt/nd/nabs  Ext: no  edema    Labs:  Reviewed    Impression: Hx of polyps    Plan:  I have explained the risks and benefits of colonoscopy to the patient including but not limited to bleeding, perforation, infection, and death. The patient wishes to proceed with colonoscopy.

## 2021-01-20 RX ORDER — TAMSULOSIN HYDROCHLORIDE 0.4 MG/1
0.4 CAPSULE ORAL DAILY
Qty: 90 CAPSULE | Refills: 1 | Status: SHIPPED | OUTPATIENT
Start: 2021-01-20 | End: 2021-04-12 | Stop reason: SDUPTHER

## 2021-01-25 ENCOUNTER — OFFICE VISIT (OUTPATIENT)
Dept: SPORTS MEDICINE | Facility: CLINIC | Age: 66
End: 2021-01-25
Payer: COMMERCIAL

## 2021-01-25 VITALS
BODY MASS INDEX: 30.7 KG/M2 | HEART RATE: 91 BPM | HEIGHT: 66 IN | DIASTOLIC BLOOD PRESSURE: 80 MMHG | SYSTOLIC BLOOD PRESSURE: 137 MMHG | WEIGHT: 191 LBS

## 2021-01-25 DIAGNOSIS — M17.12 PRIMARY OSTEOARTHRITIS OF LEFT KNEE: Primary | ICD-10-CM

## 2021-01-25 PROCEDURE — 3075F PR MOST RECENT SYSTOLIC BLOOD PRESS GE 130-139MM HG: ICD-10-PCS | Mod: CPTII,S$GLB,, | Performed by: ORTHOPAEDIC SURGERY

## 2021-01-25 PROCEDURE — 3008F BODY MASS INDEX DOCD: CPT | Mod: CPTII,S$GLB,, | Performed by: ORTHOPAEDIC SURGERY

## 2021-01-25 PROCEDURE — 3008F PR BODY MASS INDEX (BMI) DOCUMENTED: ICD-10-PCS | Mod: CPTII,S$GLB,, | Performed by: ORTHOPAEDIC SURGERY

## 2021-01-25 PROCEDURE — 3075F SYST BP GE 130 - 139MM HG: CPT | Mod: CPTII,S$GLB,, | Performed by: ORTHOPAEDIC SURGERY

## 2021-01-25 PROCEDURE — 99999 PR PBB SHADOW E&M-EST. PATIENT-LVL III: CPT | Mod: PBBFAC,,, | Performed by: ORTHOPAEDIC SURGERY

## 2021-01-25 PROCEDURE — 99999 PR PBB SHADOW E&M-EST. PATIENT-LVL III: ICD-10-PCS | Mod: PBBFAC,,, | Performed by: ORTHOPAEDIC SURGERY

## 2021-01-25 PROCEDURE — 3079F PR MOST RECENT DIASTOLIC BLOOD PRESSURE 80-89 MM HG: ICD-10-PCS | Mod: CPTII,S$GLB,, | Performed by: ORTHOPAEDIC SURGERY

## 2021-01-25 PROCEDURE — 3079F DIAST BP 80-89 MM HG: CPT | Mod: CPTII,S$GLB,, | Performed by: ORTHOPAEDIC SURGERY

## 2021-01-25 PROCEDURE — 99214 PR OFFICE/OUTPT VISIT, EST, LEVL IV, 30-39 MIN: ICD-10-PCS | Mod: S$GLB,,, | Performed by: ORTHOPAEDIC SURGERY

## 2021-01-25 PROCEDURE — 1126F PR PAIN SEVERITY QUANTIFIED, NO PAIN PRESENT: ICD-10-PCS | Mod: S$GLB,,, | Performed by: ORTHOPAEDIC SURGERY

## 2021-01-25 PROCEDURE — 99214 OFFICE O/P EST MOD 30 MIN: CPT | Mod: S$GLB,,, | Performed by: ORTHOPAEDIC SURGERY

## 2021-01-25 PROCEDURE — 1126F AMNT PAIN NOTED NONE PRSNT: CPT | Mod: S$GLB,,, | Performed by: ORTHOPAEDIC SURGERY

## 2021-02-22 ENCOUNTER — OFFICE VISIT (OUTPATIENT)
Dept: SPORTS MEDICINE | Facility: CLINIC | Age: 66
End: 2021-02-22
Payer: COMMERCIAL

## 2021-02-22 VITALS
WEIGHT: 194 LBS | DIASTOLIC BLOOD PRESSURE: 81 MMHG | HEART RATE: 102 BPM | SYSTOLIC BLOOD PRESSURE: 125 MMHG | HEIGHT: 66 IN | BODY MASS INDEX: 31.18 KG/M2

## 2021-02-22 DIAGNOSIS — M17.12 PRIMARY OSTEOARTHRITIS OF LEFT KNEE: Primary | ICD-10-CM

## 2021-02-22 PROCEDURE — 1125F PR PAIN SEVERITY QUANTIFIED, PAIN PRESENT: ICD-10-PCS | Mod: S$GLB,,, | Performed by: PHYSICIAN ASSISTANT

## 2021-02-22 PROCEDURE — 99999 PR PBB SHADOW E&M-EST. PATIENT-LVL III: CPT | Mod: PBBFAC,,, | Performed by: PHYSICIAN ASSISTANT

## 2021-02-22 PROCEDURE — 99499 UNLISTED E&M SERVICE: CPT | Mod: S$GLB,,, | Performed by: PHYSICIAN ASSISTANT

## 2021-02-22 PROCEDURE — 3008F PR BODY MASS INDEX (BMI) DOCUMENTED: ICD-10-PCS | Mod: CPTII,S$GLB,, | Performed by: PHYSICIAN ASSISTANT

## 2021-02-22 PROCEDURE — 20610 DRAIN/INJ JOINT/BURSA W/O US: CPT | Mod: LT,S$GLB,, | Performed by: PHYSICIAN ASSISTANT

## 2021-02-22 PROCEDURE — 20610 PR DRAIN/INJECT LARGE JOINT/BURSA: ICD-10-PCS | Mod: LT,S$GLB,, | Performed by: PHYSICIAN ASSISTANT

## 2021-02-22 PROCEDURE — 3008F BODY MASS INDEX DOCD: CPT | Mod: CPTII,S$GLB,, | Performed by: PHYSICIAN ASSISTANT

## 2021-02-22 PROCEDURE — 1125F AMNT PAIN NOTED PAIN PRSNT: CPT | Mod: S$GLB,,, | Performed by: PHYSICIAN ASSISTANT

## 2021-02-22 PROCEDURE — 99999 PR PBB SHADOW E&M-EST. PATIENT-LVL III: ICD-10-PCS | Mod: PBBFAC,,, | Performed by: PHYSICIAN ASSISTANT

## 2021-02-22 PROCEDURE — 99499 NO LOS: ICD-10-PCS | Mod: S$GLB,,, | Performed by: PHYSICIAN ASSISTANT

## 2021-03-01 ENCOUNTER — OFFICE VISIT (OUTPATIENT)
Dept: SPORTS MEDICINE | Facility: CLINIC | Age: 66
End: 2021-03-01
Payer: COMMERCIAL

## 2021-03-01 VITALS
SYSTOLIC BLOOD PRESSURE: 150 MMHG | WEIGHT: 194 LBS | HEART RATE: 92 BPM | DIASTOLIC BLOOD PRESSURE: 87 MMHG | HEIGHT: 66 IN | BODY MASS INDEX: 31.18 KG/M2

## 2021-03-01 DIAGNOSIS — M17.12 PRIMARY OSTEOARTHRITIS OF LEFT KNEE: Primary | ICD-10-CM

## 2021-03-01 PROCEDURE — 3008F BODY MASS INDEX DOCD: CPT | Mod: CPTII,S$GLB,, | Performed by: PHYSICIAN ASSISTANT

## 2021-03-01 PROCEDURE — 99999 PR PBB SHADOW E&M-EST. PATIENT-LVL III: CPT | Mod: PBBFAC,,, | Performed by: PHYSICIAN ASSISTANT

## 2021-03-01 PROCEDURE — 1126F AMNT PAIN NOTED NONE PRSNT: CPT | Mod: S$GLB,,, | Performed by: PHYSICIAN ASSISTANT

## 2021-03-01 PROCEDURE — 20610 PR DRAIN/INJECT LARGE JOINT/BURSA: ICD-10-PCS | Mod: LT,S$GLB,, | Performed by: PHYSICIAN ASSISTANT

## 2021-03-01 PROCEDURE — 3008F PR BODY MASS INDEX (BMI) DOCUMENTED: ICD-10-PCS | Mod: CPTII,S$GLB,, | Performed by: PHYSICIAN ASSISTANT

## 2021-03-01 PROCEDURE — 99499 NO LOS: ICD-10-PCS | Mod: S$GLB,,, | Performed by: PHYSICIAN ASSISTANT

## 2021-03-01 PROCEDURE — 20610 DRAIN/INJ JOINT/BURSA W/O US: CPT | Mod: LT,S$GLB,, | Performed by: PHYSICIAN ASSISTANT

## 2021-03-01 PROCEDURE — 1126F PR PAIN SEVERITY QUANTIFIED, NO PAIN PRESENT: ICD-10-PCS | Mod: S$GLB,,, | Performed by: PHYSICIAN ASSISTANT

## 2021-03-01 PROCEDURE — 99999 PR PBB SHADOW E&M-EST. PATIENT-LVL III: ICD-10-PCS | Mod: PBBFAC,,, | Performed by: PHYSICIAN ASSISTANT

## 2021-03-01 PROCEDURE — 99499 UNLISTED E&M SERVICE: CPT | Mod: S$GLB,,, | Performed by: PHYSICIAN ASSISTANT

## 2021-03-03 ENCOUNTER — TELEPHONE (OUTPATIENT)
Dept: FAMILY MEDICINE | Facility: CLINIC | Age: 66
End: 2021-03-03

## 2021-03-08 ENCOUNTER — OFFICE VISIT (OUTPATIENT)
Dept: SPORTS MEDICINE | Facility: CLINIC | Age: 66
End: 2021-03-08
Payer: COMMERCIAL

## 2021-03-08 VITALS
HEART RATE: 97 BPM | BODY MASS INDEX: 31.18 KG/M2 | WEIGHT: 194 LBS | SYSTOLIC BLOOD PRESSURE: 121 MMHG | HEIGHT: 66 IN | DIASTOLIC BLOOD PRESSURE: 75 MMHG

## 2021-03-08 DIAGNOSIS — M17.12 PRIMARY OSTEOARTHRITIS OF LEFT KNEE: Primary | ICD-10-CM

## 2021-03-08 PROCEDURE — 3008F BODY MASS INDEX DOCD: CPT | Mod: CPTII,S$GLB,, | Performed by: PHYSICIAN ASSISTANT

## 2021-03-08 PROCEDURE — 99499 UNLISTED E&M SERVICE: CPT | Mod: S$GLB,,, | Performed by: PHYSICIAN ASSISTANT

## 2021-03-08 PROCEDURE — 99499 NO LOS: ICD-10-PCS | Mod: S$GLB,,, | Performed by: PHYSICIAN ASSISTANT

## 2021-03-08 PROCEDURE — 3008F PR BODY MASS INDEX (BMI) DOCUMENTED: ICD-10-PCS | Mod: CPTII,S$GLB,, | Performed by: PHYSICIAN ASSISTANT

## 2021-03-08 PROCEDURE — 99999 PR PBB SHADOW E&M-EST. PATIENT-LVL III: CPT | Mod: PBBFAC,,, | Performed by: PHYSICIAN ASSISTANT

## 2021-03-08 PROCEDURE — 99999 PR PBB SHADOW E&M-EST. PATIENT-LVL III: ICD-10-PCS | Mod: PBBFAC,,, | Performed by: PHYSICIAN ASSISTANT

## 2021-03-08 PROCEDURE — 20610 DRAIN/INJ JOINT/BURSA W/O US: CPT | Mod: LT,S$GLB,, | Performed by: PHYSICIAN ASSISTANT

## 2021-03-08 PROCEDURE — 1126F PR PAIN SEVERITY QUANTIFIED, NO PAIN PRESENT: ICD-10-PCS | Mod: S$GLB,,, | Performed by: PHYSICIAN ASSISTANT

## 2021-03-08 PROCEDURE — 1126F AMNT PAIN NOTED NONE PRSNT: CPT | Mod: S$GLB,,, | Performed by: PHYSICIAN ASSISTANT

## 2021-03-08 PROCEDURE — 20610 PR DRAIN/INJECT LARGE JOINT/BURSA: ICD-10-PCS | Mod: LT,S$GLB,, | Performed by: PHYSICIAN ASSISTANT

## 2021-03-22 DIAGNOSIS — Z76.0 ENCOUNTER FOR MEDICATION REFILL: ICD-10-CM

## 2021-03-22 DIAGNOSIS — E78.00 HYPERCHOLESTEREMIA: ICD-10-CM

## 2021-03-22 RX ORDER — ATORVASTATIN CALCIUM 40 MG/1
TABLET, FILM COATED ORAL
Qty: 90 TABLET | Refills: 0 | Status: SHIPPED | OUTPATIENT
Start: 2021-03-22 | End: 2021-04-12 | Stop reason: SDUPTHER

## 2021-03-22 RX ORDER — AMLODIPINE BESYLATE 5 MG/1
TABLET ORAL
Qty: 90 TABLET | Refills: 0 | Status: SHIPPED | OUTPATIENT
Start: 2021-03-22 | End: 2021-04-12 | Stop reason: SDUPTHER

## 2021-04-05 RX ORDER — HYDROCHLOROTHIAZIDE 12.5 MG/1
12.5 TABLET ORAL DAILY
Qty: 90 TABLET | Refills: 1 | Status: SHIPPED | OUTPATIENT
Start: 2021-04-05 | End: 2021-04-12 | Stop reason: SDUPTHER

## 2021-04-12 DIAGNOSIS — E78.00 HYPERCHOLESTEREMIA: ICD-10-CM

## 2021-04-12 DIAGNOSIS — Z76.0 ENCOUNTER FOR MEDICATION REFILL: ICD-10-CM

## 2021-04-12 RX ORDER — ATORVASTATIN CALCIUM 40 MG/1
40 TABLET, FILM COATED ORAL DAILY
Qty: 90 TABLET | Refills: 1 | Status: SHIPPED | OUTPATIENT
Start: 2021-04-12 | End: 2021-11-29 | Stop reason: SDUPTHER

## 2021-04-12 RX ORDER — HYDROCHLOROTHIAZIDE 12.5 MG/1
12.5 TABLET ORAL DAILY
Qty: 90 TABLET | Refills: 1 | Status: SHIPPED | OUTPATIENT
Start: 2021-04-12 | End: 2021-11-29 | Stop reason: SDUPTHER

## 2021-04-12 RX ORDER — TAMSULOSIN HYDROCHLORIDE 0.4 MG/1
0.4 CAPSULE ORAL DAILY
Qty: 90 CAPSULE | Refills: 1 | Status: SHIPPED | OUTPATIENT
Start: 2021-04-12 | End: 2021-11-29 | Stop reason: SDUPTHER

## 2021-04-12 RX ORDER — AMLODIPINE BESYLATE 5 MG/1
5 TABLET ORAL DAILY
Qty: 90 TABLET | Refills: 1 | Status: SHIPPED | OUTPATIENT
Start: 2021-04-12 | End: 2021-11-29 | Stop reason: SDUPTHER

## 2021-05-17 PROBLEM — Z87.898 HISTORY OF SOLITARY PULMONARY NODULE: Status: ACTIVE | Noted: 2018-07-11

## 2021-05-19 ENCOUNTER — LAB VISIT (OUTPATIENT)
Dept: LAB | Facility: HOSPITAL | Age: 66
End: 2021-05-19
Attending: FAMILY MEDICINE
Payer: MEDICARE

## 2021-05-19 ENCOUNTER — OFFICE VISIT (OUTPATIENT)
Dept: FAMILY MEDICINE | Facility: CLINIC | Age: 66
End: 2021-05-19
Attending: FAMILY MEDICINE
Payer: MEDICARE

## 2021-05-19 VITALS
DIASTOLIC BLOOD PRESSURE: 86 MMHG | HEART RATE: 63 BPM | BODY MASS INDEX: 31.5 KG/M2 | OXYGEN SATURATION: 96 % | SYSTOLIC BLOOD PRESSURE: 122 MMHG | WEIGHT: 196 LBS | HEIGHT: 66 IN

## 2021-05-19 DIAGNOSIS — K63.5 POLYP OF COLON, UNSPECIFIED PART OF COLON, UNSPECIFIED TYPE: ICD-10-CM

## 2021-05-19 DIAGNOSIS — R35.1 BENIGN PROSTATIC HYPERPLASIA WITH NOCTURIA: ICD-10-CM

## 2021-05-19 DIAGNOSIS — I10 HYPERTENSION, UNSPECIFIED TYPE: ICD-10-CM

## 2021-05-19 DIAGNOSIS — G47.33 OSA (OBSTRUCTIVE SLEEP APNEA): ICD-10-CM

## 2021-05-19 DIAGNOSIS — D56.3 BETA THALASSEMIA MINOR: ICD-10-CM

## 2021-05-19 DIAGNOSIS — N40.1 BENIGN PROSTATIC HYPERPLASIA WITH NOCTURIA: ICD-10-CM

## 2021-05-19 DIAGNOSIS — I10 HYPERTENSION, UNSPECIFIED TYPE: Primary | ICD-10-CM

## 2021-05-19 DIAGNOSIS — E78.00 HYPERCHOLESTEREMIA: ICD-10-CM

## 2021-05-19 LAB
ALBUMIN SERPL BCP-MCNC: 3.9 G/DL (ref 3.5–5.2)
ALP SERPL-CCNC: 86 U/L (ref 55–135)
ALT SERPL W/O P-5'-P-CCNC: 23 U/L (ref 10–44)
ANION GAP SERPL CALC-SCNC: 10 MMOL/L (ref 8–16)
AST SERPL-CCNC: 23 U/L (ref 10–40)
BILIRUB SERPL-MCNC: 0.5 MG/DL (ref 0.1–1)
BUN SERPL-MCNC: 12 MG/DL (ref 8–23)
CALCIUM SERPL-MCNC: 8.9 MG/DL (ref 8.7–10.5)
CHLORIDE SERPL-SCNC: 103 MMOL/L (ref 95–110)
CHOLEST SERPL-MCNC: 162 MG/DL (ref 120–199)
CHOLEST/HDLC SERPL: 3.8 {RATIO} (ref 2–5)
CO2 SERPL-SCNC: 27 MMOL/L (ref 23–29)
CREAT SERPL-MCNC: 1.2 MG/DL (ref 0.5–1.4)
EST. GFR  (AFRICAN AMERICAN): >60 ML/MIN/1.73 M^2
EST. GFR  (NON AFRICAN AMERICAN): >60 ML/MIN/1.73 M^2
GLUCOSE SERPL-MCNC: 77 MG/DL (ref 70–110)
HDLC SERPL-MCNC: 43 MG/DL (ref 40–75)
HDLC SERPL: 26.5 % (ref 20–50)
LDLC SERPL CALC-MCNC: 104 MG/DL (ref 63–159)
NONHDLC SERPL-MCNC: 119 MG/DL
POTASSIUM SERPL-SCNC: 3.7 MMOL/L (ref 3.5–5.1)
PROSTATE SPECIFIC ANTIGEN, TOTAL: 5.2 NG/ML (ref 0–4)
PROT SERPL-MCNC: 7.4 G/DL (ref 6–8.4)
PSA FREE MFR SERPL: 27.69 %
PSA FREE SERPL-MCNC: 1.44 NG/ML (ref 0.01–1.5)
SODIUM SERPL-SCNC: 140 MMOL/L (ref 136–145)
TRIGL SERPL-MCNC: 75 MG/DL (ref 30–150)

## 2021-05-19 PROCEDURE — 3074F PR MOST RECENT SYSTOLIC BLOOD PRESSURE < 130 MM HG: ICD-10-PCS | Mod: CPTII,S$GLB,, | Performed by: FAMILY MEDICINE

## 2021-05-19 PROCEDURE — 3288F FALL RISK ASSESSMENT DOCD: CPT | Mod: CPTII,S$GLB,, | Performed by: FAMILY MEDICINE

## 2021-05-19 PROCEDURE — 1157F ADVNC CARE PLAN IN RCRD: CPT | Mod: S$GLB,,, | Performed by: FAMILY MEDICINE

## 2021-05-19 PROCEDURE — 99999 PR PBB SHADOW E&M-EST. PATIENT-LVL III: ICD-10-PCS | Mod: PBBFAC,,, | Performed by: FAMILY MEDICINE

## 2021-05-19 PROCEDURE — 80053 COMPREHEN METABOLIC PANEL: CPT | Performed by: FAMILY MEDICINE

## 2021-05-19 PROCEDURE — 1157F PR ADVANCE CARE PLAN OR EQUIV PRESENT IN MEDICAL RECORD: ICD-10-PCS | Mod: S$GLB,,, | Performed by: FAMILY MEDICINE

## 2021-05-19 PROCEDURE — 3008F PR BODY MASS INDEX (BMI) DOCUMENTED: ICD-10-PCS | Mod: CPTII,S$GLB,, | Performed by: FAMILY MEDICINE

## 2021-05-19 PROCEDURE — 84154 ASSAY OF PSA FREE: CPT | Performed by: FAMILY MEDICINE

## 2021-05-19 PROCEDURE — 1101F PR PT FALLS ASSESS DOC 0-1 FALLS W/OUT INJ PAST YR: ICD-10-PCS | Mod: CPTII,S$GLB,, | Performed by: FAMILY MEDICINE

## 2021-05-19 PROCEDURE — 3074F SYST BP LT 130 MM HG: CPT | Mod: CPTII,S$GLB,, | Performed by: FAMILY MEDICINE

## 2021-05-19 PROCEDURE — 1101F PT FALLS ASSESS-DOCD LE1/YR: CPT | Mod: CPTII,S$GLB,, | Performed by: FAMILY MEDICINE

## 2021-05-19 PROCEDURE — 99397 PR PREVENTIVE VISIT,EST,65 & OVER: ICD-10-PCS | Mod: S$GLB,,, | Performed by: FAMILY MEDICINE

## 2021-05-19 PROCEDURE — 3008F BODY MASS INDEX DOCD: CPT | Mod: CPTII,S$GLB,, | Performed by: FAMILY MEDICINE

## 2021-05-19 PROCEDURE — 80061 LIPID PANEL: CPT | Performed by: FAMILY MEDICINE

## 2021-05-19 PROCEDURE — 84153 ASSAY OF PSA TOTAL: CPT | Performed by: FAMILY MEDICINE

## 2021-05-19 PROCEDURE — 99999 PR PBB SHADOW E&M-EST. PATIENT-LVL III: CPT | Mod: PBBFAC,,, | Performed by: FAMILY MEDICINE

## 2021-05-19 PROCEDURE — 1125F AMNT PAIN NOTED PAIN PRSNT: CPT | Mod: S$GLB,,, | Performed by: FAMILY MEDICINE

## 2021-05-19 PROCEDURE — 1125F PR PAIN SEVERITY QUANTIFIED, PAIN PRESENT: ICD-10-PCS | Mod: S$GLB,,, | Performed by: FAMILY MEDICINE

## 2021-05-19 PROCEDURE — 3288F PR FALLS RISK ASSESSMENT DOCUMENTED: ICD-10-PCS | Mod: CPTII,S$GLB,, | Performed by: FAMILY MEDICINE

## 2021-05-19 PROCEDURE — 3079F PR MOST RECENT DIASTOLIC BLOOD PRESSURE 80-89 MM HG: ICD-10-PCS | Mod: CPTII,S$GLB,, | Performed by: FAMILY MEDICINE

## 2021-05-19 PROCEDURE — 99397 PER PM REEVAL EST PAT 65+ YR: CPT | Mod: S$GLB,,, | Performed by: FAMILY MEDICINE

## 2021-05-19 PROCEDURE — 3079F DIAST BP 80-89 MM HG: CPT | Mod: CPTII,S$GLB,, | Performed by: FAMILY MEDICINE

## 2021-05-19 PROCEDURE — 36415 COLL VENOUS BLD VENIPUNCTURE: CPT | Mod: PO | Performed by: FAMILY MEDICINE

## 2021-05-20 ENCOUNTER — PATIENT MESSAGE (OUTPATIENT)
Dept: FAMILY MEDICINE | Facility: CLINIC | Age: 66
End: 2021-05-20

## 2021-05-27 ENCOUNTER — PATIENT MESSAGE (OUTPATIENT)
Dept: FAMILY MEDICINE | Facility: CLINIC | Age: 66
End: 2021-05-27

## 2021-07-15 ENCOUNTER — PATIENT MESSAGE (OUTPATIENT)
Dept: INTERNAL MEDICINE | Facility: CLINIC | Age: 66
End: 2021-07-15

## 2021-11-29 ENCOUNTER — HOSPITAL ENCOUNTER (OUTPATIENT)
Dept: RADIOLOGY | Facility: HOSPITAL | Age: 66
Discharge: HOME | End: 2021-11-29
Attending: FAMILY MEDICINE
Payer: MEDICARE

## 2021-11-29 ENCOUNTER — OFFICE VISIT (OUTPATIENT)
Dept: PRIMARY CARE CLINIC | Facility: CLINIC | Age: 66
End: 2021-11-29
Payer: MEDICARE

## 2021-11-29 VITALS
HEIGHT: 66 IN | WEIGHT: 191.13 LBS | OXYGEN SATURATION: 98 % | HEART RATE: 66 BPM | SYSTOLIC BLOOD PRESSURE: 138 MMHG | TEMPERATURE: 98 F | BODY MASS INDEX: 30.72 KG/M2 | DIASTOLIC BLOOD PRESSURE: 82 MMHG

## 2021-11-29 DIAGNOSIS — G47.33 OSA (OBSTRUCTIVE SLEEP APNEA): ICD-10-CM

## 2021-11-29 DIAGNOSIS — M79.641 PAIN IN BOTH HANDS: ICD-10-CM

## 2021-11-29 DIAGNOSIS — E78.2 MIXED HYPERLIPIDEMIA: ICD-10-CM

## 2021-11-29 DIAGNOSIS — Z87.898 HISTORY OF SOLITARY PULMONARY NODULE: ICD-10-CM

## 2021-11-29 DIAGNOSIS — D56.3 BETA THALASSEMIA MINOR: ICD-10-CM

## 2021-11-29 DIAGNOSIS — R07.89 ATYPICAL CHEST PAIN: ICD-10-CM

## 2021-11-29 DIAGNOSIS — M23.204 OLD BUCKET HANDLE TEAR OF MEDIAL MENISCUS OF LEFT KNEE: ICD-10-CM

## 2021-11-29 DIAGNOSIS — Z86.010 HISTORY OF COLON POLYPS: ICD-10-CM

## 2021-11-29 DIAGNOSIS — N40.1 BENIGN PROSTATIC HYPERPLASIA WITH NOCTURIA: ICD-10-CM

## 2021-11-29 DIAGNOSIS — L81.9 HYPERPIGMENTATION: ICD-10-CM

## 2021-11-29 DIAGNOSIS — M15.9 PRIMARY OSTEOARTHRITIS INVOLVING MULTIPLE JOINTS: ICD-10-CM

## 2021-11-29 DIAGNOSIS — M79.642 PAIN IN BOTH HANDS: ICD-10-CM

## 2021-11-29 DIAGNOSIS — E66.09 CLASS 1 OBESITY DUE TO EXCESS CALORIES WITH SERIOUS COMORBIDITY AND BODY MASS INDEX (BMI) OF 30.0 TO 30.9 IN ADULT: ICD-10-CM

## 2021-11-29 DIAGNOSIS — M23.262 OLD BUCKET HANDLE TEAR OF LATERAL MENISCUS OF LEFT KNEE: ICD-10-CM

## 2021-11-29 DIAGNOSIS — R35.1 BENIGN PROSTATIC HYPERPLASIA WITH NOCTURIA: ICD-10-CM

## 2021-11-29 DIAGNOSIS — Z76.0 ENCOUNTER FOR MEDICATION REFILL: ICD-10-CM

## 2021-11-29 DIAGNOSIS — I10 PRIMARY HYPERTENSION: ICD-10-CM

## 2021-11-29 DIAGNOSIS — K21.9 GASTROESOPHAGEAL REFLUX DISEASE WITHOUT ESOPHAGITIS: ICD-10-CM

## 2021-11-29 DIAGNOSIS — N52.02 CORPORO-VENOUS OCCLUSIVE ERECTILE DYSFUNCTION: ICD-10-CM

## 2021-11-29 DIAGNOSIS — J30.89 NON-SEASONAL ALLERGIC RHINITIS DUE TO OTHER ALLERGIC TRIGGER: ICD-10-CM

## 2021-11-29 DIAGNOSIS — R97.20 ELEVATED PSA: ICD-10-CM

## 2021-11-29 DIAGNOSIS — Z76.89 ENCOUNTER TO ESTABLISH CARE: Primary | ICD-10-CM

## 2021-11-29 PROBLEM — Z86.0100 HISTORY OF COLON POLYPS: Status: ACTIVE | Noted: 2021-11-29

## 2021-11-29 PROBLEM — E66.811 CLASS 1 OBESITY DUE TO EXCESS CALORIES WITH SERIOUS COMORBIDITY AND BODY MASS INDEX (BMI) OF 30.0 TO 30.9 IN ADULT: Status: ACTIVE | Noted: 2021-11-29

## 2021-11-29 PROBLEM — M23.203 OLD BUCKET HANDLE TEAR OF MEDIAL MENISCUS OF RIGHT KNEE: Status: RESOLVED | Noted: 2020-09-08 | Resolved: 2021-11-29

## 2021-11-29 PROBLEM — M15.0 PRIMARY OSTEOARTHRITIS INVOLVING MULTIPLE JOINTS: Status: ACTIVE | Noted: 2021-11-29

## 2021-11-29 PROCEDURE — 99215 PR OFFICE/OUTPT VISIT, EST, LEVL V, 40-54 MIN: ICD-10-PCS | Mod: HCNC,S$GLB,, | Performed by: FAMILY MEDICINE

## 2021-11-29 PROCEDURE — 99499 UNLISTED E&M SERVICE: CPT | Mod: S$GLB,,, | Performed by: FAMILY MEDICINE

## 2021-11-29 PROCEDURE — 93010 EKG 12-LEAD: ICD-10-PCS | Mod: HCNC,S$GLB,, | Performed by: INTERNAL MEDICINE

## 2021-11-29 PROCEDURE — 93010 ELECTROCARDIOGRAM REPORT: CPT | Mod: HCNC,S$GLB,, | Performed by: INTERNAL MEDICINE

## 2021-11-29 PROCEDURE — 73130 X-RAY EXAM OF HAND: CPT | Mod: 26,50,HCNC, | Performed by: RADIOLOGY

## 2021-11-29 PROCEDURE — 3061F PR NEG MICROALBUMINURIA RESULT DOCUMENTED/REVIEW: ICD-10-PCS | Mod: HCNC,CPTII,S$GLB, | Performed by: FAMILY MEDICINE

## 2021-11-29 PROCEDURE — 1157F PR ADVANCE CARE PLAN OR EQUIV PRESENT IN MEDICAL RECORD: ICD-10-PCS | Mod: HCNC,CPTII,S$GLB, | Performed by: FAMILY MEDICINE

## 2021-11-29 PROCEDURE — 99215 OFFICE O/P EST HI 40 MIN: CPT | Mod: HCNC,S$GLB,, | Performed by: FAMILY MEDICINE

## 2021-11-29 PROCEDURE — 93005 ELECTROCARDIOGRAM TRACING: CPT | Mod: HCNC,S$GLB,, | Performed by: FAMILY MEDICINE

## 2021-11-29 PROCEDURE — 71046 XR CHEST PA AND LATERAL: ICD-10-PCS | Mod: 26,HCNC,, | Performed by: RADIOLOGY

## 2021-11-29 PROCEDURE — 71046 X-RAY EXAM CHEST 2 VIEWS: CPT | Mod: 26,HCNC,, | Performed by: RADIOLOGY

## 2021-11-29 PROCEDURE — 99999 PR PBB SHADOW E&M-EST. PATIENT-LVL V: CPT | Mod: PBBFAC,HCNC,, | Performed by: FAMILY MEDICINE

## 2021-11-29 PROCEDURE — 3066F PR DOCUMENTATION OF TREATMENT FOR NEPHROPATHY: ICD-10-PCS | Mod: HCNC,CPTII,S$GLB, | Performed by: FAMILY MEDICINE

## 2021-11-29 PROCEDURE — 93005 EKG 12-LEAD: ICD-10-PCS | Mod: HCNC,S$GLB,, | Performed by: FAMILY MEDICINE

## 2021-11-29 PROCEDURE — 73130 XR HAND COMPLETE 3 VIEWS BILATERAL: ICD-10-PCS | Mod: 26,50,HCNC, | Performed by: RADIOLOGY

## 2021-11-29 PROCEDURE — 71046 X-RAY EXAM CHEST 2 VIEWS: CPT | Mod: TC,HCNC,PN

## 2021-11-29 PROCEDURE — 99499 RISK ADDL DX/OHS AUDIT: ICD-10-PCS | Mod: S$GLB,,, | Performed by: FAMILY MEDICINE

## 2021-11-29 PROCEDURE — 3061F NEG MICROALBUMINURIA REV: CPT | Mod: HCNC,CPTII,S$GLB, | Performed by: FAMILY MEDICINE

## 2021-11-29 PROCEDURE — 3066F NEPHROPATHY DOC TX: CPT | Mod: HCNC,CPTII,S$GLB, | Performed by: FAMILY MEDICINE

## 2021-11-29 PROCEDURE — 73130 X-RAY EXAM OF HAND: CPT | Mod: TC,50,HCNC,PN

## 2021-11-29 PROCEDURE — 99999 PR PBB SHADOW E&M-EST. PATIENT-LVL V: ICD-10-PCS | Mod: PBBFAC,HCNC,, | Performed by: FAMILY MEDICINE

## 2021-11-29 PROCEDURE — 1157F ADVNC CARE PLAN IN RCRD: CPT | Mod: HCNC,CPTII,S$GLB, | Performed by: FAMILY MEDICINE

## 2021-11-29 RX ORDER — AMLODIPINE BESYLATE 5 MG/1
5 TABLET ORAL DAILY
Qty: 90 TABLET | Refills: 3 | Status: SHIPPED | OUTPATIENT
Start: 2021-11-29 | End: 2021-12-08 | Stop reason: SDUPTHER

## 2021-11-29 RX ORDER — HYDROCHLOROTHIAZIDE 12.5 MG/1
12.5 TABLET ORAL DAILY
Qty: 30 TABLET | Refills: 0 | Status: SHIPPED | OUTPATIENT
Start: 2021-11-29 | End: 2022-06-21 | Stop reason: ALTCHOICE

## 2021-11-29 RX ORDER — HYDROCHLOROTHIAZIDE 12.5 MG/1
12.5 CAPSULE ORAL DAILY
Qty: 90 CAPSULE | Refills: 3 | Status: SHIPPED | OUTPATIENT
Start: 2021-11-29 | End: 2022-11-30

## 2021-11-29 RX ORDER — ATORVASTATIN CALCIUM 40 MG/1
40 TABLET, FILM COATED ORAL DAILY
Qty: 90 TABLET | Refills: 3 | Status: SHIPPED | OUTPATIENT
Start: 2021-11-29 | End: 2022-11-30

## 2021-11-29 RX ORDER — TAMSULOSIN HYDROCHLORIDE 0.4 MG/1
0.4 CAPSULE ORAL DAILY
Qty: 90 CAPSULE | Refills: 3 | Status: SHIPPED | OUTPATIENT
Start: 2021-11-29 | End: 2022-11-30

## 2021-11-29 RX ORDER — AMLODIPINE BESYLATE 5 MG/1
5 TABLET ORAL DAILY
Qty: 30 TABLET | Refills: 0 | Status: SHIPPED | OUTPATIENT
Start: 2021-11-29 | End: 2022-11-30

## 2021-11-29 RX ORDER — PANTOPRAZOLE SODIUM 40 MG/1
40 TABLET, DELAYED RELEASE ORAL DAILY
Qty: 90 TABLET | Refills: 3 | Status: SHIPPED | OUTPATIENT
Start: 2021-11-29 | End: 2023-07-18

## 2021-11-29 RX ORDER — FLUTICASONE PROPIONATE 50 MCG
1 SPRAY, SUSPENSION (ML) NASAL DAILY
Qty: 16 G | Refills: 11 | Status: SHIPPED | OUTPATIENT
Start: 2021-11-29 | End: 2022-06-21 | Stop reason: SDUPTHER

## 2021-11-30 ENCOUNTER — TELEPHONE (OUTPATIENT)
Dept: PRIMARY CARE CLINIC | Facility: CLINIC | Age: 66
End: 2021-11-30
Payer: MEDICARE

## 2021-12-01 ENCOUNTER — TELEPHONE (OUTPATIENT)
Dept: UROLOGY | Facility: CLINIC | Age: 66
End: 2021-12-01
Payer: MEDICARE

## 2021-12-01 DIAGNOSIS — N40.1 BENIGN PROSTATIC HYPERPLASIA WITH NOCTURIA: ICD-10-CM

## 2021-12-01 DIAGNOSIS — R35.1 BENIGN PROSTATIC HYPERPLASIA WITH NOCTURIA: ICD-10-CM

## 2021-12-01 RX ORDER — TAMSULOSIN HYDROCHLORIDE 0.4 MG/1
0.4 CAPSULE ORAL DAILY
Qty: 90 CAPSULE | Refills: 3 | Status: CANCELLED | OUTPATIENT
Start: 2021-12-01 | End: 2022-12-01

## 2021-12-08 ENCOUNTER — HOSPITAL ENCOUNTER (EMERGENCY)
Facility: OTHER | Age: 66
Discharge: HOME | End: 2021-12-08
Attending: EMERGENCY MEDICINE
Payer: MEDICARE

## 2021-12-08 ENCOUNTER — OFFICE VISIT (OUTPATIENT)
Dept: INTERNAL MEDICINE | Facility: CLINIC | Age: 66
End: 2021-12-08
Payer: MEDICARE

## 2021-12-08 VITALS
OXYGEN SATURATION: 97 % | HEIGHT: 66 IN | DIASTOLIC BLOOD PRESSURE: 94 MMHG | SYSTOLIC BLOOD PRESSURE: 140 MMHG | WEIGHT: 190.69 LBS | HEART RATE: 78 BPM | BODY MASS INDEX: 30.65 KG/M2

## 2021-12-08 VITALS
HEART RATE: 76 BPM | TEMPERATURE: 98 F | SYSTOLIC BLOOD PRESSURE: 156 MMHG | OXYGEN SATURATION: 98 % | DIASTOLIC BLOOD PRESSURE: 92 MMHG | RESPIRATION RATE: 14 BRPM

## 2021-12-08 DIAGNOSIS — R07.9 CHEST PAIN: Primary | ICD-10-CM

## 2021-12-08 DIAGNOSIS — D64.9 ANEMIA, UNSPECIFIED TYPE: ICD-10-CM

## 2021-12-08 DIAGNOSIS — R07.89 OTHER CHEST PAIN: Primary | ICD-10-CM

## 2021-12-08 LAB
ALBUMIN SERPL BCP-MCNC: 3.9 G/DL (ref 3.5–5.2)
ALP SERPL-CCNC: 100 U/L (ref 55–135)
ALT SERPL W/O P-5'-P-CCNC: 19 U/L (ref 10–44)
ANION GAP SERPL CALC-SCNC: 11 MMOL/L (ref 8–16)
AST SERPL-CCNC: 24 U/L (ref 10–40)
BASOPHILS # BLD AUTO: 0.03 K/UL (ref 0–0.2)
BASOPHILS NFR BLD: 0.8 % (ref 0–1.9)
BILIRUB SERPL-MCNC: 0.9 MG/DL (ref 0.1–1)
BNP SERPL-MCNC: 18 PG/ML (ref 0–99)
BUN SERPL-MCNC: 16 MG/DL (ref 8–23)
CALCIUM SERPL-MCNC: 9 MG/DL (ref 8.7–10.5)
CHLORIDE SERPL-SCNC: 103 MMOL/L (ref 95–110)
CO2 SERPL-SCNC: 25 MMOL/L (ref 23–29)
CREAT SERPL-MCNC: 1.4 MG/DL (ref 0.5–1.4)
DIFFERENTIAL METHOD: ABNORMAL
EOSINOPHIL # BLD AUTO: 0.2 K/UL (ref 0–0.5)
EOSINOPHIL NFR BLD: 4.7 % (ref 0–8)
ERYTHROCYTE [DISTWIDTH] IN BLOOD BY AUTOMATED COUNT: 15.8 % (ref 11.5–14.5)
EST. GFR  (AFRICAN AMERICAN): >60 ML/MIN/1.73 M^2
EST. GFR  (NON AFRICAN AMERICAN): 52 ML/MIN/1.73 M^2
GLUCOSE SERPL-MCNC: 94 MG/DL (ref 70–110)
HCT VFR BLD AUTO: 39.7 % (ref 40–54)
HGB BLD-MCNC: 12.2 G/DL (ref 14–18)
IMM GRANULOCYTES # BLD AUTO: 0.02 K/UL (ref 0–0.04)
IMM GRANULOCYTES NFR BLD AUTO: 0.6 % (ref 0–0.5)
LYMPHOCYTES # BLD AUTO: 1.4 K/UL (ref 1–4.8)
LYMPHOCYTES NFR BLD: 37.7 % (ref 18–48)
MCH RBC QN AUTO: 22.1 PG (ref 27–31)
MCHC RBC AUTO-ENTMCNC: 30.7 G/DL (ref 32–36)
MCV RBC AUTO: 72 FL (ref 82–98)
MONOCYTES # BLD AUTO: 0.5 K/UL (ref 0.3–1)
MONOCYTES NFR BLD: 13.6 % (ref 4–15)
NEUTROPHILS # BLD AUTO: 1.5 K/UL (ref 1.8–7.7)
NEUTROPHILS NFR BLD: 42.6 % (ref 38–73)
NRBC BLD-RTO: 0 /100 WBC
PLATELET # BLD AUTO: 279 K/UL (ref 150–450)
PMV BLD AUTO: 8.7 FL (ref 9.2–12.9)
POTASSIUM SERPL-SCNC: 4 MMOL/L (ref 3.5–5.1)
PROT SERPL-MCNC: 7.6 G/DL (ref 6–8.4)
RBC # BLD AUTO: 5.52 M/UL (ref 4.6–6.2)
SODIUM SERPL-SCNC: 139 MMOL/L (ref 136–145)
TROPONIN I SERPL DL<=0.01 NG/ML-MCNC: <0.006 NG/ML (ref 0–0.03)
TROPONIN I SERPL DL<=0.01 NG/ML-MCNC: <0.006 NG/ML (ref 0–0.03)
WBC # BLD AUTO: 3.61 K/UL (ref 3.9–12.7)

## 2021-12-08 PROCEDURE — 99213 OFFICE O/P EST LOW 20 MIN: CPT | Mod: HCNC,S$GLB,, | Performed by: INTERNAL MEDICINE

## 2021-12-08 PROCEDURE — 99285 EMERGENCY DEPT VISIT HI MDM: CPT | Mod: 25,HCNC

## 2021-12-08 PROCEDURE — 1157F ADVNC CARE PLAN IN RCRD: CPT | Mod: HCNC,CPTII,S$GLB, | Performed by: INTERNAL MEDICINE

## 2021-12-08 PROCEDURE — 84484 ASSAY OF TROPONIN QUANT: CPT | Mod: HCNC | Performed by: EMERGENCY MEDICINE

## 2021-12-08 PROCEDURE — 1157F PR ADVANCE CARE PLAN OR EQUIV PRESENT IN MEDICAL RECORD: ICD-10-PCS | Mod: HCNC,CPTII,S$GLB, | Performed by: INTERNAL MEDICINE

## 2021-12-08 PROCEDURE — 3066F PR DOCUMENTATION OF TREATMENT FOR NEPHROPATHY: ICD-10-PCS | Mod: HCNC,CPTII,S$GLB, | Performed by: INTERNAL MEDICINE

## 2021-12-08 PROCEDURE — 85025 COMPLETE CBC W/AUTO DIFF WBC: CPT | Mod: HCNC | Performed by: EMERGENCY MEDICINE

## 2021-12-08 PROCEDURE — 3061F PR NEG MICROALBUMINURIA RESULT DOCUMENTED/REVIEW: ICD-10-PCS | Mod: HCNC,CPTII,S$GLB, | Performed by: INTERNAL MEDICINE

## 2021-12-08 PROCEDURE — 93010 EKG 12-LEAD: ICD-10-PCS | Mod: HCNC,,, | Performed by: INTERNAL MEDICINE

## 2021-12-08 PROCEDURE — 99999 PR PBB SHADOW E&M-EST. PATIENT-LVL IV: CPT | Mod: PBBFAC,HCNC,, | Performed by: INTERNAL MEDICINE

## 2021-12-08 PROCEDURE — 93010 ELECTROCARDIOGRAM REPORT: CPT | Mod: HCNC,,, | Performed by: INTERNAL MEDICINE

## 2021-12-08 PROCEDURE — 83880 ASSAY OF NATRIURETIC PEPTIDE: CPT | Mod: HCNC | Performed by: EMERGENCY MEDICINE

## 2021-12-08 PROCEDURE — 93005 ELECTROCARDIOGRAM TRACING: CPT | Mod: HCNC

## 2021-12-08 PROCEDURE — 3061F NEG MICROALBUMINURIA REV: CPT | Mod: HCNC,CPTII,S$GLB, | Performed by: INTERNAL MEDICINE

## 2021-12-08 PROCEDURE — 3066F NEPHROPATHY DOC TX: CPT | Mod: HCNC,CPTII,S$GLB, | Performed by: INTERNAL MEDICINE

## 2021-12-08 PROCEDURE — 80053 COMPREHEN METABOLIC PANEL: CPT | Mod: HCNC | Performed by: EMERGENCY MEDICINE

## 2021-12-08 PROCEDURE — 25000003 PHARM REV CODE 250: Mod: HCNC | Performed by: EMERGENCY MEDICINE

## 2021-12-08 PROCEDURE — 99999 PR PBB SHADOW E&M-EST. PATIENT-LVL IV: ICD-10-PCS | Mod: PBBFAC,HCNC,, | Performed by: INTERNAL MEDICINE

## 2021-12-08 PROCEDURE — 99213 PR OFFICE/OUTPT VISIT, EST, LEVL III, 20-29 MIN: ICD-10-PCS | Mod: HCNC,S$GLB,, | Performed by: INTERNAL MEDICINE

## 2021-12-08 RX ORDER — ASPIRIN 325 MG
325 TABLET ORAL
Status: COMPLETED | OUTPATIENT
Start: 2021-12-08 | End: 2021-12-08

## 2021-12-08 RX ADMIN — ASPIRIN 325 MG ORAL TABLET 325 MG: 325 PILL ORAL at 09:12

## 2021-12-10 ENCOUNTER — OFFICE VISIT (OUTPATIENT)
Dept: CARDIOLOGY | Facility: CLINIC | Age: 66
End: 2021-12-10
Payer: MEDICARE

## 2021-12-10 VITALS
SYSTOLIC BLOOD PRESSURE: 136 MMHG | BODY MASS INDEX: 30.68 KG/M2 | DIASTOLIC BLOOD PRESSURE: 88 MMHG | HEART RATE: 82 BPM | WEIGHT: 190.88 LBS | OXYGEN SATURATION: 98 % | HEIGHT: 66 IN

## 2021-12-10 DIAGNOSIS — E78.00 HYPERCHOLESTEREMIA: ICD-10-CM

## 2021-12-10 DIAGNOSIS — I10 PRIMARY HYPERTENSION: Primary | ICD-10-CM

## 2021-12-10 PROCEDURE — 3066F NEPHROPATHY DOC TX: CPT | Mod: HCNC,CPTII,S$GLB, | Performed by: INTERNAL MEDICINE

## 2021-12-10 PROCEDURE — 3066F PR DOCUMENTATION OF TREATMENT FOR NEPHROPATHY: ICD-10-PCS | Mod: HCNC,CPTII,S$GLB, | Performed by: INTERNAL MEDICINE

## 2021-12-10 PROCEDURE — 1157F PR ADVANCE CARE PLAN OR EQUIV PRESENT IN MEDICAL RECORD: ICD-10-PCS | Mod: HCNC,CPTII,S$GLB, | Performed by: INTERNAL MEDICINE

## 2021-12-10 PROCEDURE — 3061F NEG MICROALBUMINURIA REV: CPT | Mod: HCNC,CPTII,S$GLB, | Performed by: INTERNAL MEDICINE

## 2021-12-10 PROCEDURE — 3061F PR NEG MICROALBUMINURIA RESULT DOCUMENTED/REVIEW: ICD-10-PCS | Mod: HCNC,CPTII,S$GLB, | Performed by: INTERNAL MEDICINE

## 2021-12-10 PROCEDURE — 99999 PR PBB SHADOW E&M-EST. PATIENT-LVL III: ICD-10-PCS | Mod: PBBFAC,HCNC,, | Performed by: INTERNAL MEDICINE

## 2021-12-10 PROCEDURE — 1157F ADVNC CARE PLAN IN RCRD: CPT | Mod: HCNC,CPTII,S$GLB, | Performed by: INTERNAL MEDICINE

## 2021-12-10 PROCEDURE — 99204 PR OFFICE/OUTPT VISIT, NEW, LEVL IV, 45-59 MIN: ICD-10-PCS | Mod: HCNC,S$GLB,, | Performed by: INTERNAL MEDICINE

## 2021-12-10 PROCEDURE — 99999 PR PBB SHADOW E&M-EST. PATIENT-LVL III: CPT | Mod: PBBFAC,HCNC,, | Performed by: INTERNAL MEDICINE

## 2021-12-10 PROCEDURE — 99204 OFFICE O/P NEW MOD 45 MIN: CPT | Mod: HCNC,S$GLB,, | Performed by: INTERNAL MEDICINE

## 2021-12-13 ENCOUNTER — HOSPITAL ENCOUNTER (OUTPATIENT)
Dept: CARDIOLOGY | Facility: HOSPITAL | Age: 66
Discharge: HOME | End: 2021-12-13
Attending: FAMILY MEDICINE
Payer: MEDICARE

## 2021-12-13 VITALS
HEART RATE: 82 BPM | WEIGHT: 190 LBS | RESPIRATION RATE: 18 BRPM | DIASTOLIC BLOOD PRESSURE: 79 MMHG | HEIGHT: 66 IN | SYSTOLIC BLOOD PRESSURE: 125 MMHG | BODY MASS INDEX: 30.53 KG/M2

## 2021-12-13 DIAGNOSIS — R07.89 ATYPICAL CHEST PAIN: ICD-10-CM

## 2021-12-13 LAB
ASCENDING AORTA: 3.04 CM
BSA FOR ECHO PROCEDURE: 2 M2
CV ECHO LV RWT: 0.48 CM
CV STRESS BASE HR: 82 BPM
DIASTOLIC BLOOD PRESSURE: 79 MMHG
DOP CALC LVOT AREA: 3.8 CM2
DOP CALC LVOT DIAMETER: 2.21 CM
DOP CALC LVOT PEAK VEL: 1.01 M/S
DOP CALC LVOT STROKE VOLUME: 70.62 CM3
DOP CALCLVOT PEAK VEL VTI: 18.42 CM
E WAVE DECELERATION TIME: 185.38 MSEC
E/A RATIO: 0.88
E/E' RATIO: 10.77 M/S
ECHO LV POSTERIOR WALL: 0.93 CM (ref 0.6–1.1)
EJECTION FRACTION: 58 %
FRACTIONAL SHORTENING: 28 % (ref 28–44)
INTERVENTRICULAR SEPTUM: 0.97 CM (ref 0.6–1.1)
LA MAJOR: 4.21 CM
LA MINOR: 4.41 CM
LA WIDTH: 3.93 CM
LEFT ATRIUM SIZE: 2.98 CM
LEFT ATRIUM VOLUME INDEX MOD: 24.6 ML/M2
LEFT ATRIUM VOLUME INDEX: 21.9 ML/M2
LEFT ATRIUM VOLUME MOD: 48.15 CM3
LEFT ATRIUM VOLUME: 42.88 CM3
LEFT INTERNAL DIMENSION IN SYSTOLE: 2.83 CM (ref 2.1–4)
LEFT VENTRICLE DIASTOLIC VOLUME INDEX: 33.92 ML/M2
LEFT VENTRICLE DIASTOLIC VOLUME: 66.49 ML
LEFT VENTRICLE MASS INDEX: 58 G/M2
LEFT VENTRICLE SYSTOLIC VOLUME INDEX: 15.4 ML/M2
LEFT VENTRICLE SYSTOLIC VOLUME: 30.21 ML
LEFT VENTRICULAR INTERNAL DIMENSION IN DIASTOLE: 3.91 CM (ref 3.5–6)
LEFT VENTRICULAR MASS: 114.04 G
LV LATERAL E/E' RATIO: 10 M/S
LV SEPTAL E/E' RATIO: 11.67 M/S
MV A" WAVE DURATION": 9.42 MSEC
MV PEAK A VEL: 0.8 M/S
MV PEAK E VEL: 0.7 M/S
MV STENOSIS PRESSURE HALF TIME: 53.76 MS
MV VALVE AREA P 1/2 METHOD: 4.09 CM2
OHS CV CPX 1 MINUTE RECOVERY HEART RATE: 131 BPM
OHS CV CPX 85 PERCENT MAX PREDICTED HEART RATE MALE: 132
OHS CV CPX MAX PREDICTED HEART RATE: 155
OHS CV CPX PATIENT IS FEMALE: 0
OHS CV CPX PATIENT IS MALE: 1
OHS CV CPX PEAK DIASTOLIC BLOOD PRESSURE: 54 MMHG
OHS CV CPX PEAK HEAR RATE: 137 BPM
OHS CV CPX PEAK RATE PRESSURE PRODUCT: NORMAL
OHS CV CPX PEAK SYSTOLIC BLOOD PRESSURE: 166 MMHG
OHS CV CPX PERCENT MAX PREDICTED HEART RATE ACHIEVED: 88
OHS CV CPX RATE PRESSURE PRODUCT PRESENTING: NORMAL
PULM VEIN S/D RATIO: 1.32
PV PEAK D VEL: 0.38 M/S
PV PEAK S VEL: 0.5 M/S
RA MAJOR: 4.17 CM
RA PRESSURE: 3 MMHG
RA WIDTH: 3.9 CM
RIGHT VENTRICULAR END-DIASTOLIC DIMENSION: 3.76 CM
RV TISSUE DOPPLER FREE WALL SYSTOLIC VELOCITY 1 (APICAL 4 CHAMBER VIEW): 13.76 CM/S
SINUS: 3.07 CM
STJ: 2.76 CM
STRESS ST DEPRESSION: 0.3 MM
SYSTOLIC BLOOD PRESSURE: 125 MMHG
TDI LATERAL: 0.07 M/S
TDI SEPTAL: 0.06 M/S
TDI: 0.07 M/S
TRICUSPID ANNULAR PLANE SYSTOLIC EXCURSION: 2.02 CM

## 2021-12-13 PROCEDURE — 63600175 PHARM REV CODE 636 W HCPCS: Mod: HCNC | Performed by: FAMILY MEDICINE

## 2021-12-13 PROCEDURE — 93351 STRESS TTE COMPLETE: CPT | Mod: 26,HCNC,, | Performed by: INTERNAL MEDICINE

## 2021-12-13 PROCEDURE — 93351 STRESS ECHO (CUPID ONLY): ICD-10-PCS | Mod: 26,HCNC,, | Performed by: INTERNAL MEDICINE

## 2021-12-13 PROCEDURE — 93351 STRESS TTE COMPLETE: CPT | Mod: HCNC

## 2021-12-13 RX ORDER — ATROPINE SULFATE 0.1 MG/ML
1 INJECTION INTRAVENOUS
Status: COMPLETED | OUTPATIENT
Start: 2021-12-13 | End: 2021-12-13

## 2021-12-13 RX ORDER — DOBUTAMINE HYDROCHLORIDE 400 MG/100ML
10 INJECTION INTRAVENOUS
Status: COMPLETED | OUTPATIENT
Start: 2021-12-13 | End: 2021-12-13

## 2021-12-13 RX ADMIN — ATROPINE SULFATE 1 MG: 0.1 INJECTION PARENTERAL at 04:12

## 2021-12-13 RX ADMIN — DOBUTAMINE HYDROCHLORIDE 10 MCG/KG/MIN: 400 INJECTION INTRAVENOUS at 04:12

## 2021-12-16 ENCOUNTER — TELEPHONE (OUTPATIENT)
Dept: SLEEP MEDICINE | Facility: CLINIC | Age: 66
End: 2021-12-16
Payer: MEDICARE

## 2021-12-16 ENCOUNTER — OFFICE VISIT (OUTPATIENT)
Dept: UROLOGY | Facility: CLINIC | Age: 66
End: 2021-12-16
Payer: MEDICARE

## 2021-12-16 VITALS
HEART RATE: 89 BPM | WEIGHT: 190 LBS | BODY MASS INDEX: 30.53 KG/M2 | SYSTOLIC BLOOD PRESSURE: 124 MMHG | DIASTOLIC BLOOD PRESSURE: 82 MMHG | HEIGHT: 66 IN

## 2021-12-16 DIAGNOSIS — N13.8 BPH WITH OBSTRUCTION/LOWER URINARY TRACT SYMPTOMS: ICD-10-CM

## 2021-12-16 DIAGNOSIS — R97.20 ELEVATED PSA: ICD-10-CM

## 2021-12-16 DIAGNOSIS — N40.1 BPH WITH OBSTRUCTION/LOWER URINARY TRACT SYMPTOMS: ICD-10-CM

## 2021-12-16 DIAGNOSIS — G47.39 OTHER SLEEP APNEA: Primary | ICD-10-CM

## 2021-12-16 PROCEDURE — 3066F PR DOCUMENTATION OF TREATMENT FOR NEPHROPATHY: ICD-10-PCS | Mod: HCNC,CPTII,S$GLB, | Performed by: NURSE PRACTITIONER

## 2021-12-16 PROCEDURE — 99203 PR OFFICE/OUTPT VISIT, NEW, LEVL III, 30-44 MIN: ICD-10-PCS | Mod: HCNC,S$GLB,, | Performed by: NURSE PRACTITIONER

## 2021-12-16 PROCEDURE — 3061F NEG MICROALBUMINURIA REV: CPT | Mod: HCNC,CPTII,S$GLB, | Performed by: NURSE PRACTITIONER

## 2021-12-16 PROCEDURE — 3066F NEPHROPATHY DOC TX: CPT | Mod: HCNC,CPTII,S$GLB, | Performed by: NURSE PRACTITIONER

## 2021-12-16 PROCEDURE — 1157F ADVNC CARE PLAN IN RCRD: CPT | Mod: HCNC,CPTII,S$GLB, | Performed by: NURSE PRACTITIONER

## 2021-12-16 PROCEDURE — 99203 OFFICE O/P NEW LOW 30 MIN: CPT | Mod: HCNC,S$GLB,, | Performed by: NURSE PRACTITIONER

## 2021-12-16 PROCEDURE — 1157F PR ADVANCE CARE PLAN OR EQUIV PRESENT IN MEDICAL RECORD: ICD-10-PCS | Mod: HCNC,CPTII,S$GLB, | Performed by: NURSE PRACTITIONER

## 2021-12-16 PROCEDURE — 3061F PR NEG MICROALBUMINURIA RESULT DOCUMENTED/REVIEW: ICD-10-PCS | Mod: HCNC,CPTII,S$GLB, | Performed by: NURSE PRACTITIONER

## 2021-12-20 ENCOUNTER — OFFICE VISIT (OUTPATIENT)
Dept: ORTHOPEDICS | Facility: CLINIC | Age: 66
End: 2021-12-20
Payer: MEDICARE

## 2021-12-20 DIAGNOSIS — M79.642 PAIN IN BOTH HANDS: ICD-10-CM

## 2021-12-20 DIAGNOSIS — M79.641 PAIN IN BOTH HANDS: ICD-10-CM

## 2021-12-20 DIAGNOSIS — M65.332 TRIGGER FINGER, LEFT MIDDLE FINGER: Primary | ICD-10-CM

## 2021-12-20 PROCEDURE — 3061F PR NEG MICROALBUMINURIA RESULT DOCUMENTED/REVIEW: ICD-10-PCS | Mod: HCNC,CPTII,S$GLB, | Performed by: ORTHOPAEDIC SURGERY

## 2021-12-20 PROCEDURE — 99999 PR PBB SHADOW E&M-EST. PATIENT-LVL II: CPT | Mod: PBBFAC,HCNC,, | Performed by: ORTHOPAEDIC SURGERY

## 2021-12-20 PROCEDURE — 20550 NJX 1 TENDON SHEATH/LIGAMENT: CPT | Mod: HCNC,F2,S$GLB, | Performed by: ORTHOPAEDIC SURGERY

## 2021-12-20 PROCEDURE — 20550: ICD-10-PCS | Mod: HCNC,F2,S$GLB, | Performed by: ORTHOPAEDIC SURGERY

## 2021-12-20 PROCEDURE — 3061F NEG MICROALBUMINURIA REV: CPT | Mod: HCNC,CPTII,S$GLB, | Performed by: ORTHOPAEDIC SURGERY

## 2021-12-20 PROCEDURE — 99203 OFFICE O/P NEW LOW 30 MIN: CPT | Mod: HCNC,25,S$GLB, | Performed by: ORTHOPAEDIC SURGERY

## 2021-12-20 PROCEDURE — 3066F PR DOCUMENTATION OF TREATMENT FOR NEPHROPATHY: ICD-10-PCS | Mod: HCNC,CPTII,S$GLB, | Performed by: ORTHOPAEDIC SURGERY

## 2021-12-20 PROCEDURE — 3066F NEPHROPATHY DOC TX: CPT | Mod: HCNC,CPTII,S$GLB, | Performed by: ORTHOPAEDIC SURGERY

## 2021-12-20 PROCEDURE — 99203 PR OFFICE/OUTPT VISIT, NEW, LEVL III, 30-44 MIN: ICD-10-PCS | Mod: HCNC,25,S$GLB, | Performed by: ORTHOPAEDIC SURGERY

## 2021-12-20 PROCEDURE — 1157F PR ADVANCE CARE PLAN OR EQUIV PRESENT IN MEDICAL RECORD: ICD-10-PCS | Mod: HCNC,CPTII,S$GLB, | Performed by: ORTHOPAEDIC SURGERY

## 2021-12-20 PROCEDURE — 99999 PR PBB SHADOW E&M-EST. PATIENT-LVL II: ICD-10-PCS | Mod: PBBFAC,HCNC,, | Performed by: ORTHOPAEDIC SURGERY

## 2021-12-20 PROCEDURE — 1157F ADVNC CARE PLAN IN RCRD: CPT | Mod: HCNC,CPTII,S$GLB, | Performed by: ORTHOPAEDIC SURGERY

## 2021-12-20 RX ORDER — DEXAMETHASONE SODIUM PHOSPHATE 4 MG/ML
4 INJECTION, SOLUTION INTRA-ARTICULAR; INTRALESIONAL; INTRAMUSCULAR; INTRAVENOUS; SOFT TISSUE
Status: DISCONTINUED | OUTPATIENT
Start: 2021-12-20 | End: 2021-12-20 | Stop reason: HOSPADM

## 2021-12-20 RX ADMIN — DEXAMETHASONE SODIUM PHOSPHATE 4 MG: 4 INJECTION, SOLUTION INTRA-ARTICULAR; INTRALESIONAL; INTRAMUSCULAR; INTRAVENOUS; SOFT TISSUE at 02:12

## 2021-12-21 ENCOUNTER — TELEPHONE (OUTPATIENT)
Dept: SLEEP MEDICINE | Facility: CLINIC | Age: 66
End: 2021-12-21
Payer: MEDICARE

## 2022-01-05 ENCOUNTER — OFFICE VISIT (OUTPATIENT)
Dept: SLEEP MEDICINE | Facility: CLINIC | Age: 67
End: 2022-01-05
Payer: MEDICARE

## 2022-01-05 VITALS
HEIGHT: 66 IN | WEIGHT: 194.69 LBS | DIASTOLIC BLOOD PRESSURE: 80 MMHG | HEART RATE: 80 BPM | SYSTOLIC BLOOD PRESSURE: 129 MMHG | BODY MASS INDEX: 31.29 KG/M2

## 2022-01-05 DIAGNOSIS — I10 PRIMARY HYPERTENSION: ICD-10-CM

## 2022-01-05 DIAGNOSIS — G47.33 OSA (OBSTRUCTIVE SLEEP APNEA): Primary | ICD-10-CM

## 2022-01-05 DIAGNOSIS — G47.39 OTHER SLEEP APNEA: ICD-10-CM

## 2022-01-05 PROCEDURE — 1159F PR MEDICATION LIST DOCUMENTED IN MEDICAL RECORD: ICD-10-PCS | Mod: HCNC,CPTII,S$GLB, | Performed by: NURSE PRACTITIONER

## 2022-01-05 PROCEDURE — 3079F DIAST BP 80-89 MM HG: CPT | Mod: HCNC,CPTII,S$GLB, | Performed by: NURSE PRACTITIONER

## 2022-01-05 PROCEDURE — 1125F PR PAIN SEVERITY QUANTIFIED, PAIN PRESENT: ICD-10-PCS | Mod: HCNC,CPTII,S$GLB, | Performed by: NURSE PRACTITIONER

## 2022-01-05 PROCEDURE — 3008F BODY MASS INDEX DOCD: CPT | Mod: HCNC,CPTII,S$GLB, | Performed by: NURSE PRACTITIONER

## 2022-01-05 PROCEDURE — 3008F PR BODY MASS INDEX (BMI) DOCUMENTED: ICD-10-PCS | Mod: HCNC,CPTII,S$GLB, | Performed by: NURSE PRACTITIONER

## 2022-01-05 PROCEDURE — 1101F PT FALLS ASSESS-DOCD LE1/YR: CPT | Mod: HCNC,CPTII,S$GLB, | Performed by: NURSE PRACTITIONER

## 2022-01-05 PROCEDURE — 1125F AMNT PAIN NOTED PAIN PRSNT: CPT | Mod: HCNC,CPTII,S$GLB, | Performed by: NURSE PRACTITIONER

## 2022-01-05 PROCEDURE — 3074F PR MOST RECENT SYSTOLIC BLOOD PRESSURE < 130 MM HG: ICD-10-PCS | Mod: HCNC,CPTII,S$GLB, | Performed by: NURSE PRACTITIONER

## 2022-01-05 PROCEDURE — 99204 OFFICE O/P NEW MOD 45 MIN: CPT | Mod: HCNC,S$GLB,, | Performed by: NURSE PRACTITIONER

## 2022-01-05 PROCEDURE — 3079F PR MOST RECENT DIASTOLIC BLOOD PRESSURE 80-89 MM HG: ICD-10-PCS | Mod: HCNC,CPTII,S$GLB, | Performed by: NURSE PRACTITIONER

## 2022-01-05 PROCEDURE — 1157F PR ADVANCE CARE PLAN OR EQUIV PRESENT IN MEDICAL RECORD: ICD-10-PCS | Mod: HCNC,CPTII,S$GLB, | Performed by: NURSE PRACTITIONER

## 2022-01-05 PROCEDURE — 99999 PR PBB SHADOW E&M-EST. PATIENT-LVL III: CPT | Mod: PBBFAC,HCNC,, | Performed by: NURSE PRACTITIONER

## 2022-01-05 PROCEDURE — 1159F MED LIST DOCD IN RCRD: CPT | Mod: HCNC,CPTII,S$GLB, | Performed by: NURSE PRACTITIONER

## 2022-01-05 PROCEDURE — 99204 PR OFFICE/OUTPT VISIT, NEW, LEVL IV, 45-59 MIN: ICD-10-PCS | Mod: HCNC,S$GLB,, | Performed by: NURSE PRACTITIONER

## 2022-01-05 PROCEDURE — 1101F PR PT FALLS ASSESS DOC 0-1 FALLS W/OUT INJ PAST YR: ICD-10-PCS | Mod: HCNC,CPTII,S$GLB, | Performed by: NURSE PRACTITIONER

## 2022-01-05 PROCEDURE — 1157F ADVNC CARE PLAN IN RCRD: CPT | Mod: HCNC,CPTII,S$GLB, | Performed by: NURSE PRACTITIONER

## 2022-01-05 PROCEDURE — 3288F FALL RISK ASSESSMENT DOCD: CPT | Mod: HCNC,CPTII,S$GLB, | Performed by: NURSE PRACTITIONER

## 2022-01-05 PROCEDURE — 99999 PR PBB SHADOW E&M-EST. PATIENT-LVL III: ICD-10-PCS | Mod: PBBFAC,HCNC,, | Performed by: NURSE PRACTITIONER

## 2022-01-05 PROCEDURE — 3288F PR FALLS RISK ASSESSMENT DOCUMENTED: ICD-10-PCS | Mod: HCNC,CPTII,S$GLB, | Performed by: NURSE PRACTITIONER

## 2022-01-05 PROCEDURE — 3074F SYST BP LT 130 MM HG: CPT | Mod: HCNC,CPTII,S$GLB, | Performed by: NURSE PRACTITIONER

## 2022-01-05 NOTE — PROGRESS NOTES
"Cc: ANGELIC, last seen 2018 sleep clinic by Dr. Bojorquez, initial visit with him    He is adherent with PAP but limited recently due to finding mask uncomfortable and then recall. Needs new machine/supplies, has medicare now. ESS=8. Using F20, not ever tried nose mask. Sleep more consolidate and snoring resolved with pap. Rev'd study with him.   Remote review 60d 2019 avg 5.6h/negative AHI 4.8, 90% tile 13cm          HST AHI 39, Mean Spo2: 94.8. . < 90 Spo2 2.4%. Time snorin%    PHYSICAL EXAM:  /80 (BP Location: Left arm, Patient Position: Sitting, BP Method: Medium (Automatic))   Pulse 80   Ht 5' 6" (1.676 m)   Wt 88.3 kg (194 lb 10.7 oz)   BMI 31.42 kg/m²                      ASSESSMENT:  ANGELIC , severe. Adherent AHI<5. Benefits from therapy. Stopped due to recall and mask interface, ready to resume consistent use. Eligible new machine Medicare  Medical co-morbidities: systemic HTN and obesity      PLAN:  NEW machine CPAP 5- 15cm, TH SDME . RTC b/t 31-90d after setup adherence Avoid back sleep w/o machine  Discussed etiology of ANGELIC and potential ramifications of untreated ANGELIC, including heart disease, hypertension, cognitive difficulties, stroke, and diabetes.    See PCP re: HTN mgt as advised/continue meds    "

## 2022-04-19 ENCOUNTER — OFFICE VISIT (OUTPATIENT)
Dept: PODIATRY | Facility: CLINIC | Age: 67
End: 2022-04-19
Payer: MEDICARE

## 2022-04-19 VITALS
HEART RATE: 87 BPM | DIASTOLIC BLOOD PRESSURE: 85 MMHG | BODY MASS INDEX: 31.29 KG/M2 | WEIGHT: 194.69 LBS | HEIGHT: 66 IN | SYSTOLIC BLOOD PRESSURE: 144 MMHG

## 2022-04-19 DIAGNOSIS — M79.675 TOE PAIN, LEFT: ICD-10-CM

## 2022-04-19 DIAGNOSIS — L08.1 ERYTHRASMA: Primary | ICD-10-CM

## 2022-04-19 PROCEDURE — 1157F PR ADVANCE CARE PLAN OR EQUIV PRESENT IN MEDICAL RECORD: ICD-10-PCS | Mod: CPTII,S$GLB,, | Performed by: PODIATRIST

## 2022-04-19 PROCEDURE — 1101F PT FALLS ASSESS-DOCD LE1/YR: CPT | Mod: CPTII,S$GLB,, | Performed by: PODIATRIST

## 2022-04-19 PROCEDURE — 1126F PR PAIN SEVERITY QUANTIFIED, NO PAIN PRESENT: ICD-10-PCS | Mod: CPTII,S$GLB,, | Performed by: PODIATRIST

## 2022-04-19 PROCEDURE — 3079F DIAST BP 80-89 MM HG: CPT | Mod: CPTII,S$GLB,, | Performed by: PODIATRIST

## 2022-04-19 PROCEDURE — 1160F PR REVIEW ALL MEDS BY PRESCRIBER/CLIN PHARMACIST DOCUMENTED: ICD-10-PCS | Mod: CPTII,S$GLB,, | Performed by: PODIATRIST

## 2022-04-19 PROCEDURE — 3008F PR BODY MASS INDEX (BMI) DOCUMENTED: ICD-10-PCS | Mod: CPTII,S$GLB,, | Performed by: PODIATRIST

## 2022-04-19 PROCEDURE — 99999 PR PBB SHADOW E&M-EST. PATIENT-LVL III: CPT | Mod: PBBFAC,,, | Performed by: PODIATRIST

## 2022-04-19 PROCEDURE — 3008F BODY MASS INDEX DOCD: CPT | Mod: CPTII,S$GLB,, | Performed by: PODIATRIST

## 2022-04-19 PROCEDURE — 3079F PR MOST RECENT DIASTOLIC BLOOD PRESSURE 80-89 MM HG: ICD-10-PCS | Mod: CPTII,S$GLB,, | Performed by: PODIATRIST

## 2022-04-19 PROCEDURE — 1101F PR PT FALLS ASSESS DOC 0-1 FALLS W/OUT INJ PAST YR: ICD-10-PCS | Mod: CPTII,S$GLB,, | Performed by: PODIATRIST

## 2022-04-19 PROCEDURE — 1160F RVW MEDS BY RX/DR IN RCRD: CPT | Mod: CPTII,S$GLB,, | Performed by: PODIATRIST

## 2022-04-19 PROCEDURE — 1159F PR MEDICATION LIST DOCUMENTED IN MEDICAL RECORD: ICD-10-PCS | Mod: CPTII,S$GLB,, | Performed by: PODIATRIST

## 2022-04-19 PROCEDURE — 3077F PR MOST RECENT SYSTOLIC BLOOD PRESSURE >= 140 MM HG: ICD-10-PCS | Mod: CPTII,S$GLB,, | Performed by: PODIATRIST

## 2022-04-19 PROCEDURE — 99204 OFFICE O/P NEW MOD 45 MIN: CPT | Mod: S$GLB,,, | Performed by: PODIATRIST

## 2022-04-19 PROCEDURE — 3288F FALL RISK ASSESSMENT DOCD: CPT | Mod: CPTII,S$GLB,, | Performed by: PODIATRIST

## 2022-04-19 PROCEDURE — 99999 PR PBB SHADOW E&M-EST. PATIENT-LVL III: ICD-10-PCS | Mod: PBBFAC,,, | Performed by: PODIATRIST

## 2022-04-19 PROCEDURE — 3077F SYST BP >= 140 MM HG: CPT | Mod: CPTII,S$GLB,, | Performed by: PODIATRIST

## 2022-04-19 PROCEDURE — 1126F AMNT PAIN NOTED NONE PRSNT: CPT | Mod: CPTII,S$GLB,, | Performed by: PODIATRIST

## 2022-04-19 PROCEDURE — 3288F PR FALLS RISK ASSESSMENT DOCUMENTED: ICD-10-PCS | Mod: CPTII,S$GLB,, | Performed by: PODIATRIST

## 2022-04-19 PROCEDURE — 1159F MED LIST DOCD IN RCRD: CPT | Mod: CPTII,S$GLB,, | Performed by: PODIATRIST

## 2022-04-19 PROCEDURE — 1157F ADVNC CARE PLAN IN RCRD: CPT | Mod: CPTII,S$GLB,, | Performed by: PODIATRIST

## 2022-04-19 PROCEDURE — 99204 PR OFFICE/OUTPT VISIT, NEW, LEVL IV, 45-59 MIN: ICD-10-PCS | Mod: S$GLB,,, | Performed by: PODIATRIST

## 2022-04-19 RX ORDER — ERYTHROMYCIN 5 MG/G
OINTMENT OPHTHALMIC 2 TIMES DAILY
Qty: 3.5 G | Refills: 2 | Status: SHIPPED | OUTPATIENT
Start: 2022-04-19 | End: 2022-05-03

## 2022-04-19 NOTE — PROGRESS NOTES
PODIATRY NOTE     PATIENT ID:  Aaron Guerra is a 67 y.o. male.     CHIEF CONCERN:   No chief complaint on file.           MEDICAL DECISION MAKING:        ICD-10-CM ICD-9-CM    1. Erythrasma  L08.1 039.0 erythromycin (ROMYCIN) ophthalmic ointment   2. Toe pain, left  M79.675 729.5        · I counseled the patient on the patient's conditions, their implications and medical management.    · Meds as ordered.  Shoe and activity modification as needed for relief.   Dry in between the toes well.   Follow up if no improvement in two weeks.                 HISTORY OF PRESENT ILLNESS:  Aaron Guerra is a 67 y.o. male with concerns regarding:  left 5th toe pain for the past month.   Trauma/injury to the area:  None recalled.  Did note that the pain started after wearing steel toe boot, when a blister also developed.       Patient Active Problem List   Diagnosis    Hypertension    Colon polyp    Allergic rhinitis    Benign prostatic hyperplasia with lower urinary tract symptoms    Hypercholesteremia    ANGELIC (obstructive sleep apnea)    [place] cardiac risk 10-20% in next 10 years    Primary osteoarthritis of left shoulder    DJD (degenerative joint disease), cervical    Elevated PSA    Beta thalassemia minor    History of solitary pulmonary nodule    Old bucket handle tear of lateral meniscus of left knee    Old bucket handle tear of medial meniscus of left knee    Gastroesophageal reflux disease without esophagitis    Class 1 obesity due to excess calories with serious comorbidity and body mass index (BMI) of 30.0 to 30.9 in adult    Corporo-venous occlusive erectile dysfunction    History of colon polyps    Primary osteoarthritis involving multiple joints           Current Outpatient Medications on File Prior to Visit   Medication Sig Dispense Refill    amLODIPine (NORVASC) 5 MG tablet Take 1 tablet (5 mg total) by mouth once daily. 30 tablet 0    ascorbate calcium (VITAMIN C ORAL) Take 1 tablet by mouth once  "daily.      aspirin (ECOTRIN) 81 MG EC tablet Take 81 mg by mouth once daily.      atorvastatin (LIPITOR) 40 MG tablet Take 1 tablet (40 mg total) by mouth once daily. 90 tablet 3    fluticasone propionate (FLONASE) 50 mcg/actuation nasal spray 1 spray (50 mcg total) by Each Nostril route once daily. (Patient taking differently: 1 spray by Each Nostril route once daily.) 16 g 11    hydroCHLOROthiazide (HYDRODIURIL) 12.5 MG Tab Take 1 tablet (12.5 mg total) by mouth once daily. 30 tablet 0    hydroCHLOROthiazide (MICROZIDE) 12.5 mg capsule Take 1 capsule (12.5 mg total) by mouth once daily. 90 capsule 3    ketoconazole (NIZORAL) 2 % shampoo Apply topically twice a week. 120 mL 1    pantoprazole (PROTONIX) 40 MG tablet Take 1 tablet (40 mg total) by mouth once daily. 90 tablet 3    tamsulosin (FLOMAX) 0.4 mg Cap Take 1 capsule (0.4 mg total) by mouth once daily. 90 capsule 3    vitamin E acetate (VITAMIN E ORAL) Take 1 tablet by mouth once daily.       No current facility-administered medications on file prior to visit.           Review of patient's allergies indicates:  No Known Allergies            ROS:   General ROS: negative for - chills, fever or night sweats  Respiratory ROS: no cough, shortness of breath, or wheezing  Cardiovascular ROS: no chest pain or dyspnea on exertion  Musculoskeletal ROS: negative for - joint swelling and muscle pain  Neurological ROS: negative for - impaired coordination/balance and numbness/tingling  Dermatological ROS: positive for rash interdigital space      EXAM:     Vitals:    04/19/22 1540   BP: (!) 144/85   Pulse: 87   Weight: 88.3 kg (194 lb 10.7 oz)   Height: 5' 6" (1.676 m)        General:  Alert and Oriented x 3;  No acute distress      Left  Lower extremity exam:    Vascular:    Dorsalis Pedis:  present   Posterior Tibial:  present   Capillary refill time:  3 seconds   Temperature of toes warm to touch   Edema:  Trace       Neurological:      Sharp touch:  " normal   Light touch: normal   Tinels Sign:  Absent   Mulders Click:   Absent        Dermatological:    Skin: warm and moist   Wounds/Ulcers:  Absent   Bruising:  Absent   Erythema:  Absent   Heloma molle with +Woods lamp, left 4th interdigital space      Musculoskeletal:    Metatarsophalangeal range of motion:   full range of motion   Subtalar joint range of motion: full range of motion   Ankle joint range of motion:  full range of motion   Bunions:  Absent   Hammertoes: adductovarus left 5th toe

## 2022-05-05 ENCOUNTER — OFFICE VISIT (OUTPATIENT)
Dept: PRIMARY CARE CLINIC | Facility: CLINIC | Age: 67
End: 2022-05-05
Payer: MEDICARE

## 2022-05-05 VITALS
SYSTOLIC BLOOD PRESSURE: 120 MMHG | DIASTOLIC BLOOD PRESSURE: 80 MMHG | HEART RATE: 93 BPM | WEIGHT: 189.13 LBS | BODY MASS INDEX: 30.4 KG/M2 | HEIGHT: 66 IN | OXYGEN SATURATION: 96 % | TEMPERATURE: 98 F

## 2022-05-05 DIAGNOSIS — J06.9 UPPER RESPIRATORY TRACT INFECTION, UNSPECIFIED TYPE: Primary | ICD-10-CM

## 2022-05-05 PROCEDURE — 1101F PR PT FALLS ASSESS DOC 0-1 FALLS W/OUT INJ PAST YR: ICD-10-PCS | Mod: CPTII,S$GLB,, | Performed by: INTERNAL MEDICINE

## 2022-05-05 PROCEDURE — 1101F PT FALLS ASSESS-DOCD LE1/YR: CPT | Mod: CPTII,S$GLB,, | Performed by: INTERNAL MEDICINE

## 2022-05-05 PROCEDURE — 3079F PR MOST RECENT DIASTOLIC BLOOD PRESSURE 80-89 MM HG: ICD-10-PCS | Mod: CPTII,S$GLB,, | Performed by: INTERNAL MEDICINE

## 2022-05-05 PROCEDURE — 99213 PR OFFICE/OUTPT VISIT, EST, LEVL III, 20-29 MIN: ICD-10-PCS | Mod: S$GLB,,, | Performed by: INTERNAL MEDICINE

## 2022-05-05 PROCEDURE — 3008F PR BODY MASS INDEX (BMI) DOCUMENTED: ICD-10-PCS | Mod: CPTII,S$GLB,, | Performed by: INTERNAL MEDICINE

## 2022-05-05 PROCEDURE — 3288F PR FALLS RISK ASSESSMENT DOCUMENTED: ICD-10-PCS | Mod: CPTII,S$GLB,, | Performed by: INTERNAL MEDICINE

## 2022-05-05 PROCEDURE — 99213 OFFICE O/P EST LOW 20 MIN: CPT | Mod: S$GLB,,, | Performed by: INTERNAL MEDICINE

## 2022-05-05 PROCEDURE — 1157F PR ADVANCE CARE PLAN OR EQUIV PRESENT IN MEDICAL RECORD: ICD-10-PCS | Mod: CPTII,S$GLB,, | Performed by: INTERNAL MEDICINE

## 2022-05-05 PROCEDURE — 1125F AMNT PAIN NOTED PAIN PRSNT: CPT | Mod: CPTII,S$GLB,, | Performed by: INTERNAL MEDICINE

## 2022-05-05 PROCEDURE — 3079F DIAST BP 80-89 MM HG: CPT | Mod: CPTII,S$GLB,, | Performed by: INTERNAL MEDICINE

## 2022-05-05 PROCEDURE — 1159F MED LIST DOCD IN RCRD: CPT | Mod: CPTII,S$GLB,, | Performed by: INTERNAL MEDICINE

## 2022-05-05 PROCEDURE — 1157F ADVNC CARE PLAN IN RCRD: CPT | Mod: CPTII,S$GLB,, | Performed by: INTERNAL MEDICINE

## 2022-05-05 PROCEDURE — 1160F PR REVIEW ALL MEDS BY PRESCRIBER/CLIN PHARMACIST DOCUMENTED: ICD-10-PCS | Mod: CPTII,S$GLB,, | Performed by: INTERNAL MEDICINE

## 2022-05-05 PROCEDURE — 1125F PR PAIN SEVERITY QUANTIFIED, PAIN PRESENT: ICD-10-PCS | Mod: CPTII,S$GLB,, | Performed by: INTERNAL MEDICINE

## 2022-05-05 PROCEDURE — 1160F RVW MEDS BY RX/DR IN RCRD: CPT | Mod: CPTII,S$GLB,, | Performed by: INTERNAL MEDICINE

## 2022-05-05 PROCEDURE — 99999 PR PBB SHADOW E&M-EST. PATIENT-LVL IV: ICD-10-PCS | Mod: PBBFAC,,, | Performed by: INTERNAL MEDICINE

## 2022-05-05 PROCEDURE — 1159F PR MEDICATION LIST DOCUMENTED IN MEDICAL RECORD: ICD-10-PCS | Mod: CPTII,S$GLB,, | Performed by: INTERNAL MEDICINE

## 2022-05-05 PROCEDURE — 3288F FALL RISK ASSESSMENT DOCD: CPT | Mod: CPTII,S$GLB,, | Performed by: INTERNAL MEDICINE

## 2022-05-05 PROCEDURE — 3008F BODY MASS INDEX DOCD: CPT | Mod: CPTII,S$GLB,, | Performed by: INTERNAL MEDICINE

## 2022-05-05 PROCEDURE — 3074F PR MOST RECENT SYSTOLIC BLOOD PRESSURE < 130 MM HG: ICD-10-PCS | Mod: CPTII,S$GLB,, | Performed by: INTERNAL MEDICINE

## 2022-05-05 PROCEDURE — 3074F SYST BP LT 130 MM HG: CPT | Mod: CPTII,S$GLB,, | Performed by: INTERNAL MEDICINE

## 2022-05-05 PROCEDURE — 99999 PR PBB SHADOW E&M-EST. PATIENT-LVL IV: CPT | Mod: PBBFAC,,, | Performed by: INTERNAL MEDICINE

## 2022-05-05 RX ORDER — BENZONATATE 200 MG/1
200 CAPSULE ORAL 3 TIMES DAILY PRN
Qty: 30 CAPSULE | Refills: 0 | Status: SHIPPED | OUTPATIENT
Start: 2022-05-05 | End: 2022-05-15

## 2022-05-05 RX ORDER — AZITHROMYCIN 250 MG/1
TABLET, FILM COATED ORAL
Qty: 6 TABLET | Refills: 0 | Status: SHIPPED | OUTPATIENT
Start: 2022-05-05 | End: 2022-05-10

## 2022-05-05 RX ORDER — PREDNISONE 20 MG/1
20 TABLET ORAL DAILY
Qty: 5 TABLET | Refills: 0 | Status: SHIPPED | OUTPATIENT
Start: 2022-05-05 | End: 2022-06-21 | Stop reason: ALTCHOICE

## 2022-05-05 NOTE — PATIENT INSTRUCTIONS
"An UPPER RESPIRATORY ILLNESS is initially caused by a virus or allergies 95% of the time. The goal to help you feel better is drying up the drainage & stopping the cough so the virus can run it's course in about 10 days. If your drainage becomes more thick and worse after 7-10 days of trying the below  over the counter medications, please make an appointment for further evaluation    If you have POST NASAL DRIP , "RUNNY NOSE" or SORE THROAT FROM CLEARING YOUR THROAT :  Take an ANTIHISTAMINE  (Claritin or Zyrtec or Allegra ) AT NIGHT    Nasal Saline: Tilt head back and squirt into nostril 2-3 times until you taste saline in back of throat. Spit, and blow nose. Do this 4 times, alternating right and left nostril.   Do this routine 2-3 times per day.     NASAL SPRAY - Ipratropium , Nasacort (nasal steroid) or Flonase (nasal steroid)  Twice daily to decrease swelling & post nasal drip ------ very safe , works where the congestion is , & does not interfere with other medication or go through your blood stream. Consider prescription Ipratropium  Bromide nasal 0.03%    If you have THICK MUCOUS DRAINAGE from the NOSE or COUGHING UP THICK PHLEGM:  You can add a MUCOLYTIC like MUCINEX plain (guaifenesin) 1200mg twice a day    If your COUGH PERSISTS:  Nasal Spray - Ipratropium 4 times a day  Acetylcysteine reduces cough  You can add a COUGH SUPPRESSANT like DELSYM (dextromethorphan) twice a day    If you have PAIN, SORE THROAT, FEVER OR CHILLS:  You can ALTERNATE 250- 500 mg of  TYLENOL with  200-400 mg of IBUPROFEN every 3 hours - for the next 3 days  Discontinue and call me if  you have stomach upset  For SORE THROAT , try: Gargle with warm salt water 2-3 times per day.     Drink lots of WATER until your urine is clear because all of the above medications can "dry you out" and cause constipation.    "

## 2022-05-05 NOTE — PROGRESS NOTES
Subjective:      Patient ID: Aaron Guerra is a 67 y.o. male.    Chief Complaint: Cough (With headaches, chills and sweats.)    Here today for general exam.     Sore throat: Has been having cough symptoms for about 2 weeks. He continues to have body aches, chills. + Congestion +cough +HA. No headache. No fevers. He has been trying theraflu.     Denies any chest pain, shortness of breath, nausea vomiting constipation diarrhea, blood in stool, heartburn    Review of Systems   Constitutional: Positive for chills and malaise/fatigue. Negative for diaphoresis, fever and weight loss.   HENT: Positive for congestion, sinus pain and sore throat. Negative for ear discharge, ear pain and tinnitus.    Eyes: Negative for pain and discharge.   Respiratory: Positive for cough and sputum production. Negative for shortness of breath, wheezing and stridor.    Cardiovascular: Negative for chest pain.   Gastrointestinal: Negative for abdominal pain, constipation, diarrhea, nausea and vomiting.   Genitourinary: Negative for frequency and urgency.   Musculoskeletal: Positive for myalgias.   Skin: Negative for itching and rash.   Neurological: Positive for headaches. Negative for dizziness, tingling, tremors and weakness.   Psychiatric/Behavioral: Negative for depression. The patient is not nervous/anxious and does not have insomnia.          Current Outpatient Medications:     amLODIPine (NORVASC) 5 MG tablet, Take 1 tablet (5 mg total) by mouth once daily., Disp: 30 tablet, Rfl: 0    ascorbate calcium (VITAMIN C ORAL), Take 1 tablet by mouth once daily., Disp: , Rfl:     aspirin (ECOTRIN) 81 MG EC tablet, Take 81 mg by mouth once daily., Disp: , Rfl:     atorvastatin (LIPITOR) 40 MG tablet, Take 1 tablet (40 mg total) by mouth once daily., Disp: 90 tablet, Rfl: 3    fluticasone propionate (FLONASE) 50 mcg/actuation nasal spray, 1 spray (50 mcg total) by Each Nostril route once daily. (Patient taking differently: 1 spray by Each  Nostril route once daily.), Disp: 16 g, Rfl: 11    hydroCHLOROthiazide (HYDRODIURIL) 12.5 MG Tab, Take 1 tablet (12.5 mg total) by mouth once daily., Disp: 30 tablet, Rfl: 0    hydroCHLOROthiazide (MICROZIDE) 12.5 mg capsule, Take 1 capsule (12.5 mg total) by mouth once daily., Disp: 90 capsule, Rfl: 3    ketoconazole (NIZORAL) 2 % shampoo, Apply topically twice a week., Disp: 120 mL, Rfl: 1    pantoprazole (PROTONIX) 40 MG tablet, Take 1 tablet (40 mg total) by mouth once daily., Disp: 90 tablet, Rfl: 3    tamsulosin (FLOMAX) 0.4 mg Cap, Take 1 capsule (0.4 mg total) by mouth once daily., Disp: 90 capsule, Rfl: 3    vitamin E acetate (VITAMIN E ORAL), Take 1 tablet by mouth once daily., Disp: , Rfl:     azithromycin (Z-KASH) 250 MG tablet, Take 2 tablets by mouth on day 1; Take 1 tablet by mouth on days 2-5, Disp: 6 tablet, Rfl: 0    benzonatate (TESSALON) 200 MG capsule, Take 1 capsule (200 mg total) by mouth 3 (three) times daily as needed for Cough., Disp: 30 capsule, Rfl: 0    predniSONE (DELTASONE) 20 MG tablet, Take 1 tablet (20 mg total) by mouth once daily., Disp: 5 tablet, Rfl: 0    No results found for: HGBA1C  Lab Results   Component Value Date    MICALBCREAT 6.8 05/19/2021     Lab Results   Component Value Date    LDLCALC 102.2 11/29/2021    LDLCALC 104.0 05/19/2021    CHOL 158 11/29/2021    HDL 42 11/29/2021    TRIG 69 11/29/2021       Lab Results   Component Value Date     12/08/2021    K 4.0 12/08/2021     12/08/2021    CO2 25 12/08/2021    GLU 94 12/08/2021    BUN 16 12/08/2021    CREATININE 1.4 12/08/2021    CALCIUM 9.0 12/08/2021    PROT 7.6 12/08/2021    ALBUMIN 3.9 12/08/2021    BILITOT 0.9 12/08/2021    ALKPHOS 100 12/08/2021    AST 24 12/08/2021    ALT 19 12/08/2021    ANIONGAP 11 12/08/2021    ESTGFRAFRICA >60 12/08/2021    EGFRNONAA 52 (A) 12/08/2021    WBC 3.61 (L) 12/08/2021    HGB 12.2 (L) 12/08/2021    HGB 12.5 (L) 11/29/2021    HCT 39.7 (L) 12/08/2021    MCV 72 (L)  12/08/2021     12/08/2021    TSH 0.715 05/21/2018    PSA 2.25 02/11/2012    PSADIAG 7.3 (H) 01/07/2020    PSADIAG 5.7 (H) 05/21/2018    HEPCAB Negative 05/21/2018       Lab Results   Component Value Date    VKZWNPRN24TQ 28 (L) 05/08/2012    HMYDWVHU10TG 18 (L) 02/11/2012    FERRITIN 121 05/29/2018    IRON 83 05/29/2018    TRANSFERRIN 258 05/29/2018    TIBC 382 05/29/2018    FESATURATED 22 05/29/2018         Past Medical History:   Diagnosis Date    Arthritis     BPH (benign prostatic hypertrophy)     Hypertension 2012    Sleep apnea      Past Surgical History:   Procedure Laterality Date    CHONDROPLASTY OF KNEE Left 9/22/2020    Procedure: CHONDROPLASTY, KNEE;  Surgeon: Whitley Rojo MD;  Location: [facility] OR;  Service: Orthopedics;  Laterality: Left;    COLONOSCOPY N/A 12/1/2015    Procedure: COLONOSCOPY;  Surgeon: Meagan Hill MD;  [facility]  Service: Endoscopy;  Laterality: N/A;    COLONOSCOPY N/A 12/22/2020    Procedure: COLONOSCOPY;  Surgeon: Trenton Harris MD;  [facility]  Service: Endoscopy;  Laterality: N/A;  cov[facility] test 12/14-[facility]    CYSTOSCOPY WITH INSERTION OF MINIMALLY INVASIVE IMPLANT TO ENLARGE PROSTATIC URETHRA N/A 8/15/2018    Procedure: TRANSPROSTATIC TISSUE RETRACTION;  Surgeon: Trenton Weathers MD;  Location: [facility];  Service: Urology;  Laterality: N/A;    KNEE ARTHROSCOPY W/ MENISCECTOMY Left 9/22/2020    Procedure: ARTHROSCOPY, KNEE, WITH MENISCECTOMY;  Surgeon: Whitley Rojo MD;  Location: [facility] OR;  Service: Orthopedics;  Laterality: Left;    KNEE ARTHROSCOPY W/ PLICA EXCISION Left 9/22/2020    Procedure: EXCISION, PLICA, KNEE, ARTHROSCOPIC;  Surgeon: Whitley Rojo MD;  Location: [facility] OR;  Service: Orthopedics;  Laterality: Left;    SYNOVECTOMY OF KNEE Left 9/22/2020    Procedure: SYNOVECTOMY, KNEE;  Surgeon: Whitley Rojo MD;  Location: [facility] OR;  Service: Orthopedics;  Laterality: Left;    TONSILLECTOMY       Social History  "    Social History Narrative    The patient does not exercise regularly ().      He is not satisfied with weight.    Rates diet as good.    He does not drink at least 1/2 gallon water daily.    He drinks 2 coffee/tea/caffeine-containing soft drinks daily.    Total sleep time at night is 4-5 hours.    He does wear seat belts.    Hobbies include none.             Family History   Problem Relation Age of Onset    Cataracts Mother     Diabetes Mother     Hypertension Mother     Stroke Mother     Heart attack Sister     Drug abuse Brother     Hypertension Brother     Diabetes Sister     Hypertension Brother     Hypertension Sister      Vitals:    05/05/22 1402   BP: 120/80   Pulse: 93   Temp: 98.2 °F (36.8 °C)   SpO2: 96%   Weight: 85.8 kg (189 lb 2.5 oz)   Height: 5' 6" (1.676 m)   PainSc:   3     Objective:   Physical Exam  Constitutional:       General: He is not in acute distress.     Appearance: Normal appearance. He is not ill-appearing.   HENT:      Head: Normocephalic.      Right Ear: Tympanic membrane, ear canal and external ear normal.      Left Ear: Tympanic membrane, ear canal and external ear normal.      Nose: Congestion and rhinorrhea present.      Mouth/Throat:      Mouth: Mucous membranes are moist.      Pharynx: No oropharyngeal exudate or posterior oropharyngeal erythema.   Eyes:      Extraocular Movements: Extraocular movements intact.      Conjunctiva/sclera: Conjunctivae normal.      Pupils: Pupils are equal, round, and reactive to light.   Cardiovascular:      Rate and Rhythm: Normal rate.   Pulmonary:      Effort: Pulmonary effort is normal. No respiratory distress.      Breath sounds: Normal breath sounds. No wheezing.   Chest:      Chest wall: No tenderness.   Musculoskeletal:      Cervical back: Normal range of motion.   Neurological:      Mental Status: He is alert.       Assessment:     1. Upper respiratory tract infection, unspecified type      Plan:     Orders Placed This " "Encounter    benzonatate (TESSALON) 200 MG capsule    azithromycin (Z-KASH) 250 MG tablet    predniSONE (DELTASONE) 20 MG tablet       Patient Instructions   An UPPER RESPIRATORY ILLNESS is initially caused by a virus or allergies 95% of the time. The goal to help you feel better is drying up the drainage & stopping the cough so the virus can run it's course in about 10 days. If your drainage becomes more thick and worse after 7-10 days of trying the below  over the counter medications, please make an appointment for further evaluation    If you have POST NASAL DRIP , "RUNNY NOSE" or SORE THROAT FROM CLEARING YOUR THROAT :  Take an ANTIHISTAMINE  (Claritin or Zyrtec or Allegra ) AT NIGHT    Nasal Saline: Tilt head back and squirt into nostril 2-3 times until you taste saline in back of throat. Spit, and blow nose. Do this 4 times, alternating right and left nostril.   Do this routine 2-3 times per day.     NASAL SPRAY - Ipratropium , Nasacort (nasal steroid) or Flonase (nasal steroid)  Twice daily to decrease swelling & post nasal drip ------ very safe , works where the congestion is , & does not interfere with other medication or go through your blood stream. Consider prescription Ipratropium  Bromide nasal 0.03%    If you STILL HAVE DRAINAGE or EAR POPPING/ PRESSURE/ DECREASED HEARING  You can add a DECONGESTANT like Sudafed (if it doesn't cause palpitations, do NOT take this is you have high blood pressure or atrial fibrillation) or Sudafed PE  In the MORNING.     If you have THICK MUCOUS DRAINAGE from the NOSE or COUGHING UP THICK PHLEGM:  You can add a MUCOLYTIC like MUCINEX plain (guaifenesin) 1200mg twice a day    If your COUGH PERSISTS:  Nasal Spray - Ipratropium 4 times a day  Acetylcysteine reduces cough  You can add a COUGH SUPPRESSANT like DELSYM (dextromethorphan) twice a day    If you have PAIN, SORE THROAT, FEVER OR CHILLS:  You can ALTERNATE 250- 500 mg of  TYLENOL with  200-400 mg of IBUPROFEN " "every 3 hours - for the next 3 days  Discontinue and call me if  you have stomach upset    Zinc acetate or gluconate 80-92mg daily  helps with general symptoms    Lactobacillus Casei 200g daily fermented dairy product containing L. Casei  - lessened length of cold symptoms    For SORE THROAT , try: Gargle with warm salt water 2-3 times per day.     Drink lots of WATER until your urine is clear because all of the above medications can "dry you out" and cause constipation.                                "

## 2022-06-21 ENCOUNTER — OFFICE VISIT (OUTPATIENT)
Dept: PRIMARY CARE CLINIC | Facility: CLINIC | Age: 67
End: 2022-06-21
Payer: MEDICARE

## 2022-06-21 ENCOUNTER — LAB VISIT (OUTPATIENT)
Dept: LAB | Facility: HOSPITAL | Age: 67
End: 2022-06-21
Attending: INTERNAL MEDICINE
Payer: MEDICARE

## 2022-06-21 VITALS
HEART RATE: 78 BPM | OXYGEN SATURATION: 97 % | SYSTOLIC BLOOD PRESSURE: 110 MMHG | DIASTOLIC BLOOD PRESSURE: 68 MMHG | WEIGHT: 189.81 LBS | HEIGHT: 66 IN | BODY MASS INDEX: 30.51 KG/M2 | TEMPERATURE: 98 F

## 2022-06-21 DIAGNOSIS — D64.9 ANEMIA, UNSPECIFIED TYPE: ICD-10-CM

## 2022-06-21 DIAGNOSIS — Z12.5 ENCOUNTER FOR SCREENING FOR MALIGNANT NEOPLASM OF PROSTATE: ICD-10-CM

## 2022-06-21 DIAGNOSIS — Z00.00 ROUTINE GENERAL MEDICAL EXAMINATION AT A HEALTH CARE FACILITY: Primary | ICD-10-CM

## 2022-06-21 DIAGNOSIS — I10 BENIGN ESSENTIAL HTN: ICD-10-CM

## 2022-06-21 DIAGNOSIS — E78.2 COMBINED HYPERLIPIDEMIA: ICD-10-CM

## 2022-06-21 DIAGNOSIS — K63.5 POLYP OF COLON, UNSPECIFIED PART OF COLON, UNSPECIFIED TYPE: ICD-10-CM

## 2022-06-21 DIAGNOSIS — R97.20 ELEVATED PSA: ICD-10-CM

## 2022-06-21 DIAGNOSIS — J30.89 NON-SEASONAL ALLERGIC RHINITIS DUE TO OTHER ALLERGIC TRIGGER: ICD-10-CM

## 2022-06-21 DIAGNOSIS — L29.9 ITCH: ICD-10-CM

## 2022-06-21 DIAGNOSIS — Z00.00 ROUTINE GENERAL MEDICAL EXAMINATION AT A HEALTH CARE FACILITY: ICD-10-CM

## 2022-06-21 DIAGNOSIS — Z87.898 HISTORY OF SOLITARY PULMONARY NODULE: ICD-10-CM

## 2022-06-21 LAB
ALBUMIN SERPL BCP-MCNC: 3.9 G/DL (ref 3.5–5.2)
ALP SERPL-CCNC: 78 U/L (ref 55–135)
ALT SERPL W/O P-5'-P-CCNC: 21 U/L (ref 10–44)
ANION GAP SERPL CALC-SCNC: 9 MMOL/L (ref 8–16)
AST SERPL-CCNC: 24 U/L (ref 10–40)
BASOPHILS # BLD AUTO: 0.03 K/UL (ref 0–0.2)
BASOPHILS NFR BLD: 0.7 % (ref 0–1.9)
BILIRUB SERPL-MCNC: 0.8 MG/DL (ref 0.1–1)
BUN SERPL-MCNC: 17 MG/DL (ref 8–23)
CALCIUM SERPL-MCNC: 9.1 MG/DL (ref 8.7–10.5)
CHLORIDE SERPL-SCNC: 103 MMOL/L (ref 95–110)
CO2 SERPL-SCNC: 27 MMOL/L (ref 23–29)
COMPLEXED PSA SERPL-MCNC: 7.5 NG/ML (ref 0–4)
CREAT SERPL-MCNC: 1.4 MG/DL (ref 0.5–1.4)
DIFFERENTIAL METHOD: ABNORMAL
EOSINOPHIL # BLD AUTO: 0.2 K/UL (ref 0–0.5)
EOSINOPHIL NFR BLD: 4.1 % (ref 0–8)
ERYTHROCYTE [DISTWIDTH] IN BLOOD BY AUTOMATED COUNT: 16.9 % (ref 11.5–14.5)
EST. GFR  (AFRICAN AMERICAN): >60 ML/MIN/1.73 M^2
EST. GFR  (NON AFRICAN AMERICAN): 52 ML/MIN/1.73 M^2
GLUCOSE SERPL-MCNC: 93 MG/DL (ref 70–110)
HCT VFR BLD AUTO: 36.6 % (ref 40–54)
HGB BLD-MCNC: 10.8 G/DL (ref 14–18)
IMM GRANULOCYTES # BLD AUTO: 0.01 K/UL (ref 0–0.04)
IMM GRANULOCYTES NFR BLD AUTO: 0.2 % (ref 0–0.5)
LYMPHOCYTES # BLD AUTO: 1.5 K/UL (ref 1–4.8)
LYMPHOCYTES NFR BLD: 36.8 % (ref 18–48)
MCH RBC QN AUTO: 22.3 PG (ref 27–31)
MCHC RBC AUTO-ENTMCNC: 29.5 G/DL (ref 32–36)
MCV RBC AUTO: 76 FL (ref 82–98)
MONOCYTES # BLD AUTO: 0.5 K/UL (ref 0.3–1)
MONOCYTES NFR BLD: 11.7 % (ref 4–15)
NEUTROPHILS # BLD AUTO: 1.9 K/UL (ref 1.8–7.7)
NEUTROPHILS NFR BLD: 46.5 % (ref 38–73)
NRBC BLD-RTO: 0 /100 WBC
PLATELET # BLD AUTO: 269 K/UL (ref 150–450)
PMV BLD AUTO: 10.2 FL (ref 9.2–12.9)
POTASSIUM SERPL-SCNC: 3.8 MMOL/L (ref 3.5–5.1)
PROT SERPL-MCNC: 7 G/DL (ref 6–8.4)
RBC # BLD AUTO: 4.84 M/UL (ref 4.6–6.2)
SODIUM SERPL-SCNC: 139 MMOL/L (ref 136–145)
WBC # BLD AUTO: 4.18 K/UL (ref 3.9–12.7)

## 2022-06-21 PROCEDURE — 3074F PR MOST RECENT SYSTOLIC BLOOD PRESSURE < 130 MM HG: ICD-10-PCS | Mod: CPTII,S$GLB,, | Performed by: INTERNAL MEDICINE

## 2022-06-21 PROCEDURE — 80053 COMPREHEN METABOLIC PANEL: CPT | Performed by: INTERNAL MEDICINE

## 2022-06-21 PROCEDURE — 99214 OFFICE O/P EST MOD 30 MIN: CPT | Mod: S$GLB,,, | Performed by: INTERNAL MEDICINE

## 2022-06-21 PROCEDURE — 1160F PR REVIEW ALL MEDS BY PRESCRIBER/CLIN PHARMACIST DOCUMENTED: ICD-10-PCS | Mod: CPTII,S$GLB,, | Performed by: INTERNAL MEDICINE

## 2022-06-21 PROCEDURE — 84153 ASSAY OF PSA TOTAL: CPT | Performed by: INTERNAL MEDICINE

## 2022-06-21 PROCEDURE — 1125F AMNT PAIN NOTED PAIN PRSNT: CPT | Mod: CPTII,S$GLB,, | Performed by: INTERNAL MEDICINE

## 2022-06-21 PROCEDURE — 99999 PR PBB SHADOW E&M-EST. PATIENT-LVL IV: CPT | Mod: PBBFAC,,, | Performed by: INTERNAL MEDICINE

## 2022-06-21 PROCEDURE — 3078F DIAST BP <80 MM HG: CPT | Mod: CPTII,S$GLB,, | Performed by: INTERNAL MEDICINE

## 2022-06-21 PROCEDURE — 1159F MED LIST DOCD IN RCRD: CPT | Mod: CPTII,S$GLB,, | Performed by: INTERNAL MEDICINE

## 2022-06-21 PROCEDURE — 1157F PR ADVANCE CARE PLAN OR EQUIV PRESENT IN MEDICAL RECORD: ICD-10-PCS | Mod: CPTII,S$GLB,, | Performed by: INTERNAL MEDICINE

## 2022-06-21 PROCEDURE — 1157F ADVNC CARE PLAN IN RCRD: CPT | Mod: CPTII,S$GLB,, | Performed by: INTERNAL MEDICINE

## 2022-06-21 PROCEDURE — 99214 PR OFFICE/OUTPT VISIT, EST, LEVL IV, 30-39 MIN: ICD-10-PCS | Mod: S$GLB,,, | Performed by: INTERNAL MEDICINE

## 2022-06-21 PROCEDURE — 3288F FALL RISK ASSESSMENT DOCD: CPT | Mod: CPTII,S$GLB,, | Performed by: INTERNAL MEDICINE

## 2022-06-21 PROCEDURE — 1125F PR PAIN SEVERITY QUANTIFIED, PAIN PRESENT: ICD-10-PCS | Mod: CPTII,S$GLB,, | Performed by: INTERNAL MEDICINE

## 2022-06-21 PROCEDURE — 3008F BODY MASS INDEX DOCD: CPT | Mod: CPTII,S$GLB,, | Performed by: INTERNAL MEDICINE

## 2022-06-21 PROCEDURE — 99999 PR PBB SHADOW E&M-EST. PATIENT-LVL IV: ICD-10-PCS | Mod: PBBFAC,,, | Performed by: INTERNAL MEDICINE

## 2022-06-21 PROCEDURE — 1160F RVW MEDS BY RX/DR IN RCRD: CPT | Mod: CPTII,S$GLB,, | Performed by: INTERNAL MEDICINE

## 2022-06-21 PROCEDURE — 36415 COLL VENOUS BLD VENIPUNCTURE: CPT | Mod: PN | Performed by: INTERNAL MEDICINE

## 2022-06-21 PROCEDURE — 3288F PR FALLS RISK ASSESSMENT DOCUMENTED: ICD-10-PCS | Mod: CPTII,S$GLB,, | Performed by: INTERNAL MEDICINE

## 2022-06-21 PROCEDURE — 1101F PT FALLS ASSESS-DOCD LE1/YR: CPT | Mod: CPTII,S$GLB,, | Performed by: INTERNAL MEDICINE

## 2022-06-21 PROCEDURE — 1159F PR MEDICATION LIST DOCUMENTED IN MEDICAL RECORD: ICD-10-PCS | Mod: CPTII,S$GLB,, | Performed by: INTERNAL MEDICINE

## 2022-06-21 PROCEDURE — 1101F PR PT FALLS ASSESS DOC 0-1 FALLS W/OUT INJ PAST YR: ICD-10-PCS | Mod: CPTII,S$GLB,, | Performed by: INTERNAL MEDICINE

## 2022-06-21 PROCEDURE — 3074F SYST BP LT 130 MM HG: CPT | Mod: CPTII,S$GLB,, | Performed by: INTERNAL MEDICINE

## 2022-06-21 PROCEDURE — 3008F PR BODY MASS INDEX (BMI) DOCUMENTED: ICD-10-PCS | Mod: CPTII,S$GLB,, | Performed by: INTERNAL MEDICINE

## 2022-06-21 PROCEDURE — 85025 COMPLETE CBC W/AUTO DIFF WBC: CPT | Performed by: INTERNAL MEDICINE

## 2022-06-21 PROCEDURE — 3078F PR MOST RECENT DIASTOLIC BLOOD PRESSURE < 80 MM HG: ICD-10-PCS | Mod: CPTII,S$GLB,, | Performed by: INTERNAL MEDICINE

## 2022-06-21 RX ORDER — FLUTICASONE PROPIONATE 50 MCG
1 SPRAY, SUSPENSION (ML) NASAL DAILY
Qty: 16 G | Refills: 3 | Status: SHIPPED | OUTPATIENT
Start: 2022-06-21 | End: 2023-07-18 | Stop reason: SDUPTHER

## 2022-06-21 RX ORDER — HYDROXYZINE HYDROCHLORIDE 25 MG/1
25 TABLET, FILM COATED ORAL 3 TIMES DAILY PRN
Qty: 90 TABLET | Refills: 0 | Status: SHIPPED | OUTPATIENT
Start: 2022-06-21 | End: 2023-07-18

## 2022-06-21 RX ORDER — FLUTICASONE PROPIONATE 50 MCG
1 SPRAY, SUSPENSION (ML) NASAL DAILY
Qty: 16 G | Refills: 3 | Status: SHIPPED | OUTPATIENT
Start: 2022-06-21 | End: 2022-06-21

## 2022-06-21 NOTE — PROGRESS NOTES
Subjective:      Patient ID: Aaron Guerra is a 67 y.o. male.    Chief Complaint: Sinus Problem (Seasonal allergies) and Itching (Back and arm regions)    Here today for general exam.  PMH significant HTN, BPH, HLD, ANGELIC awaiting CPAP, elevated PSA, beta thalassemia minor, history of solid pulmonary nodule    HTN: BP well controlled. Office /68. Continues on amlodipine and HCTZ.  No CP/SOB/lightheadedness/dizziness/palpitations.     ANGELIC: Awaiting CPAP, he was told that there was issues with supplies due to COVID and has been waiting awhile. He plans on reaching out to his sleep medicine doctor to inquire about status of his CPAP machine.    Itching: Has been an issue for several months. He note constant itching of his back. There is no lesion, no rash, no induration, no erythema. There is a small lipoma around where the itching is but most likely not the cause for constant itching. Will order hydroxyzine today to help. Consider dermatology referral.     Seasonal allergies: Renew flonase.     Beta thalassemia minor: Note mildly low CBC with low MCV. Continue to monitor.     Denies any chest pain, shortness of breath, nausea vomiting constipation diarrhea, blood in stool, heartburn    Colonoscopy - 12/22/2020, due 2030    Review of Systems   Constitutional: Negative for chills, fever and weight loss.   HENT: Negative for congestion, ear pain and sore throat.    Eyes: Negative for double vision.   Respiratory: Negative for cough and shortness of breath.    Cardiovascular: Negative for chest pain, palpitations and leg swelling.   Gastrointestinal: Negative for abdominal pain, heartburn, nausea and vomiting.   Skin: Negative for rash.   Neurological: Negative for dizziness, tingling and headaches.   Psychiatric/Behavioral: Negative for depression.         Current Outpatient Medications:     amLODIPine (NORVASC) 5 MG tablet, Take 1 tablet (5 mg total) by mouth once daily., Disp: 30 tablet, Rfl: 0    ascorbate calcium  (VITAMIN C ORAL), Take 1 tablet by mouth once daily., Disp: , Rfl:     aspirin (ECOTRIN) 81 MG EC tablet, Take 81 mg by mouth once daily., Disp: , Rfl:     atorvastatin (LIPITOR) 40 MG tablet, Take 1 tablet (40 mg total) by mouth once daily., Disp: 90 tablet, Rfl: 3    hydroCHLOROthiazide (MICROZIDE) 12.5 mg capsule, Take 1 capsule (12.5 mg total) by mouth once daily., Disp: 90 capsule, Rfl: 3    ketoconazole (NIZORAL) 2 % shampoo, Apply topically twice a week., Disp: 120 mL, Rfl: 1    pantoprazole (PROTONIX) 40 MG tablet, Take 1 tablet (40 mg total) by mouth once daily., Disp: 90 tablet, Rfl: 3    tamsulosin (FLOMAX) 0.4 mg Cap, Take 1 capsule (0.4 mg total) by mouth once daily., Disp: 90 capsule, Rfl: 3    vitamin E acetate (VITAMIN E ORAL), Take 1 tablet by mouth once daily., Disp: , Rfl:     fluticasone propionate (FLONASE) 50 mcg/actuation nasal spray, 1 spray (50 mcg total) by Each Nostril route once daily., Disp: 16 g, Rfl: 3    hydrOXYzine HCL (ATARAX) 25 MG tablet, Take 1 tablet (25 mg total) by mouth 3 (three) times daily as needed for Itching., Disp: 90 tablet, Rfl: 0    No results found for: HGBA1C  Lab Results   Component Value Date    MICALBCREAT 6.8 05/19/2021     Lab Results   Component Value Date    LDLCALC 102.2 11/29/2021    LDLCALC 104.0 05/19/2021    CHOL 158 11/29/2021    HDL 42 11/29/2021    TRIG 69 11/29/2021       Lab Results   Component Value Date     12/08/2021    K 4.0 12/08/2021     12/08/2021    CO2 25 12/08/2021    GLU 94 12/08/2021    BUN 16 12/08/2021    CREATININE 1.4 12/08/2021    CALCIUM 9.0 12/08/2021    PROT 7.6 12/08/2021    ALBUMIN 3.9 12/08/2021    BILITOT 0.9 12/08/2021    ALKPHOS 100 12/08/2021    AST 24 12/08/2021    ALT 19 12/08/2021    ANIONGAP 11 12/08/2021    ESTGFRAFRICA >60 12/08/2021    EGFRNONAA 52 (A) 12/08/2021    WBC 3.61 (L) 12/08/2021    HGB 12.2 (L) 12/08/2021    HGB 12.5 (L) 11/29/2021    HCT 39.7 (L) 12/08/2021    MCV 72 (L) 12/08/2021      12/08/2021    TSH 0.715 05/21/2018    PSA 2.25 02/11/2012    PSADIAG 7.3 (H) 01/07/2020    PSADIAG 5.7 (H) 05/21/2018    HEPCAB Negative 05/21/2018       Lab Results   Component Value Date    POSSXOOX75PL 28 (L) 05/08/2012    LUMLCGSS31WX 18 (L) 02/11/2012    FERRITIN 121 05/29/2018    IRON 83 05/29/2018    TRANSFERRIN 258 05/29/2018    TIBC 382 05/29/2018    FESATURATED 22 05/29/2018         Past Medical History:   Diagnosis Date    Arthritis     BPH (benign prostatic hypertrophy)     Hypertension 2012    Sleep apnea      Past Surgical History:   Procedure Laterality Date    CHONDROPLASTY OF KNEE Left 9/22/2020    Procedure: CHONDROPLASTY, KNEE;  Surgeon: Whitley Rojo MD;  Location: [facility] OR;  Service: Orthopedics;  Laterality: Left;    COLONOSCOPY N/A 12/1/2015    Procedure: COLONOSCOPY;  Surgeon: Meagan Hill MD;  Location: [address]);  Service: Endoscopy;  Laterality: N/A;    COLONOSCOPY N/A 12/22/2020    Procedure: COLONOSCOPY;  Surgeon: Trenton Harris MD;  Location: [facility] ([address] [place] [address]);  Service: Endoscopy;  Laterality: N/A;  covid test 12/14-[facility]    CYSTOSCOPY WITH INSERTION OF MINIMALLY INVASIVE IMPLANT TO ENLARGE PROSTATIC URETHRA N/A 8/15/2018    Procedure: TRANSPROSTATIC TISSUE RETRACTION;  Surgeon: Trenton Weathers MD;  Location: [facility];  Service: Urology;  Laterality: N/A;    KNEE ARTHROSCOPY W/ MENISCECTOMY Left 9/22/2020    Procedure: ARTHROSCOPY, KNEE, WITH MENISCECTOMY;  Surgeon: Whitley Rojo MD;  Location: [facility] OR;  Service: Orthopedics;  Laterality: Left;    KNEE ARTHROSCOPY W/ PLICA EXCISION Left 9/22/2020    Procedure: EXCISION, PLICA, KNEE, ARTHROSCOPIC;  Surgeon: Whitley Rojo MD;  Location: [facility] OR;  Service: Orthopedics;  Laterality: Left;    SYNOVECTOMY OF KNEE Left 9/22/2020    Procedure: SYNOVECTOMY, KNEE;  Surgeon: Whitley Rojo MD;  Location: [facility] OR;  Service: Orthopedics;  Laterality: Left;    TONSILLECTOMY       Social History     Social  "History Narrative    The patient does not exercise regularly ().      He is not satisfied with weight.    Rates diet as good.    He does not drink at least 1/2 gallon water daily.    He drinks 2 coffee/tea/caffeine-containing soft drinks daily.    Total sleep time at night is 4-5 hours.    He does wear seat belts.    Hobbies include none.             Family History   Problem Relation Age of Onset    Cataracts Mother     Diabetes Mother     Hypertension Mother     Stroke Mother     Heart attack Sister     Drug abuse Brother     Hypertension Brother     Diabetes Sister     Hypertension Brother     Hypertension Sister      Vitals:    06/21/22 0911   BP: 110/68   Pulse: 78   Temp: 98.2 °F (36.8 °C)   SpO2: 97%   Weight: 86.1 kg (189 lb 13.1 oz)   Height: 5' 6" (1.676 m)   PainSc:   1     Objective:   Physical Exam  Vitals reviewed.   Constitutional:       Appearance: Normal appearance.   HENT:      Head: Normocephalic.   Eyes:      Pupils: Pupils are equal, round, and reactive to light.   Cardiovascular:      Rate and Rhythm: Normal rate.      Pulses: Normal pulses.      Heart sounds: Normal heart sounds.   Pulmonary:      Effort: Pulmonary effort is normal.      Breath sounds: Normal breath sounds.   Abdominal:      General: Bowel sounds are normal.      Palpations: Abdomen is soft.   Musculoskeletal:         General: Normal range of motion.   Skin:     General: Skin is warm.   Neurological:      General: No focal deficit present.      Mental Status: He is alert. Mental status is at baseline.   Psychiatric:         Mood and Affect: Mood normal.       Assessment:     1. Routine general medical examination at a health care facility    2. Non-seasonal allergic rhinitis due to other allergic trigger    3. Polyp of colon, unspecified part of colon, unspecified type    4. History of solitary pulmonary nodule    5. Benign essential HTN    6. Combined hyperlipidemia    7. Anemia, unspecified type    8. Itch    9. " Elevated PSA    10. Encounter for screening for malignant neoplasm of prostate       Plan:     Orders Placed This Encounter    CBC Auto Differential    Comprehensive Metabolic Panel    PSA, Screening    Ambulatory referral/consult to Dermatology    fluticasone propionate (FLONASE) 50 mcg/actuation nasal spray    hydrOXYzine HCL (ATARAX) 25 MG tablet       Patient Instructions   1. Labs are fasting. Please do not eat or drink anything other than water for 6-8 hrs prior to your lab work.    2. 12 months for well visit or sooner if needed.

## 2022-06-22 ENCOUNTER — PATIENT MESSAGE (OUTPATIENT)
Dept: PRIMARY CARE CLINIC | Facility: CLINIC | Age: 67
End: 2022-06-22
Payer: MEDICARE

## 2022-06-22 DIAGNOSIS — R97.20 ELEVATED PSA: Primary | ICD-10-CM

## 2022-06-22 NOTE — PROGRESS NOTES
Hello!    Your blood count (Hemoglobin) show mild anemia, consistent with beta thalassemia minor. Stable    Your sugar number (Glucose) is within normal limits, you do not have diabetes.   Rest of your electrolytes are unremarkable.    Your kidney (BUN, Creatinine and GFT) function is unremarkable.   Your liver (AST, ALT) function is unremarkable.  These are the filters in your body for medicine, food and liquids that you ingest.    Your PSA is elevated. I saw that you were working with urology previously and think you should follow up with them for this. I am making a referral for urology for your elevated PSA.

## 2022-07-09 ENCOUNTER — OFFICE VISIT (OUTPATIENT)
Dept: URGENT CARE | Facility: CLINIC | Age: 67
End: 2022-07-09
Payer: MEDICARE

## 2022-07-09 VITALS
HEIGHT: 66 IN | RESPIRATION RATE: 16 BRPM | DIASTOLIC BLOOD PRESSURE: 77 MMHG | WEIGHT: 184 LBS | SYSTOLIC BLOOD PRESSURE: 120 MMHG | HEART RATE: 85 BPM | BODY MASS INDEX: 29.57 KG/M2 | OXYGEN SATURATION: 96 % | TEMPERATURE: 98 F

## 2022-07-09 DIAGNOSIS — S61.412A LACERATION OF LEFT HAND WITHOUT FOREIGN BODY, INITIAL ENCOUNTER: Primary | ICD-10-CM

## 2022-07-09 PROCEDURE — 3078F PR MOST RECENT DIASTOLIC BLOOD PRESSURE < 80 MM HG: ICD-10-PCS | Mod: CPTII,S$GLB,, | Performed by: FAMILY MEDICINE

## 2022-07-09 PROCEDURE — 12001 LACERATION REPAIR: ICD-10-PCS | Mod: S$GLB,,, | Performed by: FAMILY MEDICINE

## 2022-07-09 PROCEDURE — 1159F PR MEDICATION LIST DOCUMENTED IN MEDICAL RECORD: ICD-10-PCS | Mod: CPTII,S$GLB,, | Performed by: FAMILY MEDICINE

## 2022-07-09 PROCEDURE — 99214 OFFICE O/P EST MOD 30 MIN: CPT | Mod: 25,S$GLB,, | Performed by: FAMILY MEDICINE

## 2022-07-09 PROCEDURE — 1125F PR PAIN SEVERITY QUANTIFIED, PAIN PRESENT: ICD-10-PCS | Mod: CPTII,S$GLB,, | Performed by: FAMILY MEDICINE

## 2022-07-09 PROCEDURE — 3008F PR BODY MASS INDEX (BMI) DOCUMENTED: ICD-10-PCS | Mod: CPTII,S$GLB,, | Performed by: FAMILY MEDICINE

## 2022-07-09 PROCEDURE — 3078F DIAST BP <80 MM HG: CPT | Mod: CPTII,S$GLB,, | Performed by: FAMILY MEDICINE

## 2022-07-09 PROCEDURE — 12001 RPR S/N/AX/GEN/TRNK 2.5CM/<: CPT | Mod: S$GLB,,, | Performed by: FAMILY MEDICINE

## 2022-07-09 PROCEDURE — 99214 PR OFFICE/OUTPT VISIT, EST, LEVL IV, 30-39 MIN: ICD-10-PCS | Mod: 25,S$GLB,, | Performed by: FAMILY MEDICINE

## 2022-07-09 PROCEDURE — 1160F RVW MEDS BY RX/DR IN RCRD: CPT | Mod: CPTII,S$GLB,, | Performed by: FAMILY MEDICINE

## 2022-07-09 PROCEDURE — 1157F PR ADVANCE CARE PLAN OR EQUIV PRESENT IN MEDICAL RECORD: ICD-10-PCS | Mod: CPTII,S$GLB,, | Performed by: FAMILY MEDICINE

## 2022-07-09 PROCEDURE — 1160F PR REVIEW ALL MEDS BY PRESCRIBER/CLIN PHARMACIST DOCUMENTED: ICD-10-PCS | Mod: CPTII,S$GLB,, | Performed by: FAMILY MEDICINE

## 2022-07-09 PROCEDURE — 1125F AMNT PAIN NOTED PAIN PRSNT: CPT | Mod: CPTII,S$GLB,, | Performed by: FAMILY MEDICINE

## 2022-07-09 PROCEDURE — 1157F ADVNC CARE PLAN IN RCRD: CPT | Mod: CPTII,S$GLB,, | Performed by: FAMILY MEDICINE

## 2022-07-09 PROCEDURE — 3074F PR MOST RECENT SYSTOLIC BLOOD PRESSURE < 130 MM HG: ICD-10-PCS | Mod: CPTII,S$GLB,, | Performed by: FAMILY MEDICINE

## 2022-07-09 PROCEDURE — 1159F MED LIST DOCD IN RCRD: CPT | Mod: CPTII,S$GLB,, | Performed by: FAMILY MEDICINE

## 2022-07-09 PROCEDURE — 3008F BODY MASS INDEX DOCD: CPT | Mod: CPTII,S$GLB,, | Performed by: FAMILY MEDICINE

## 2022-07-09 PROCEDURE — 3074F SYST BP LT 130 MM HG: CPT | Mod: CPTII,S$GLB,, | Performed by: FAMILY MEDICINE

## 2022-07-09 RX ORDER — CEPHALEXIN 500 MG/1
500 CAPSULE ORAL EVERY 12 HOURS
Qty: 14 CAPSULE | Refills: 0 | Status: SHIPPED | OUTPATIENT
Start: 2022-07-09 | End: 2022-07-16

## 2022-07-09 NOTE — PROGRESS NOTES
"Subjective:       Patient ID: Aaron Guerra is a 67 y.o. male.    Vitals:  height is 5' 6" (1.676 m) and weight is 83.5 kg (184 lb). His temperature is 97.8 °F (36.6 °C). His blood pressure is 120/77 and his pulse is 85. His respiration is 16 and oxygen saturation is 96%.     Chief Complaint: Laceration    66yo male presents with L hand laceration. States he was drilling and cut his hand with the drill. States it Did not go deep. Patient has [id number] wound on L hand. Tdap in 2018.     Laceration   The incident occurred 1 to 3 hours ago (2h). The laceration is located on the left hand. The laceration is 1 cm in size. The laceration mechanism was a metal edge. The pain is at a severity of 2/10. The pain is mild. It is unknown if a foreign body is present. His tetanus status is UTD (2018).       Skin: Positive for laceration.       Objective:      Physical Exam   Constitutional: He is oriented to person, place, and time. He appears well-developed. He is cooperative.  Non-toxic appearance. He does not appear ill. No distress.   HENT:   Head: Normocephalic and atraumatic.   Ears:   Right Ear: Hearing normal.   Left Ear: Hearing normal.   Nose: Nose normal. No mucosal edema, rhinorrhea or nasal deformity. No epistaxis. Right sinus exhibits no maxillary sinus tenderness and no frontal sinus tenderness. Left sinus exhibits no maxillary sinus tenderness and no frontal sinus tenderness.   Mouth/Throat: Uvula is midline, oropharynx is clear and moist and mucous membranes are normal. No trismus in the jaw. Normal dentition. No uvula swelling. No posterior oropharyngeal erythema.   Eyes: Conjunctivae and lids are normal. Right eye exhibits no discharge. Left eye exhibits no discharge. No scleral icterus.   Neck: Trachea normal and phonation normal. Neck supple.   Cardiovascular: Normal rate, regular rhythm, normal heart sounds and normal pulses.   No murmur heard.  Pulmonary/Chest: Effort normal and breath sounds normal. No stridor. " No respiratory distress. He has no wheezes. He has no rhonchi. He has no rales.   Abdominal: Normal appearance and bowel sounds are normal. He exhibits no distension and no mass. Soft. There is no abdominal tenderness.   Musculoskeletal: Normal range of motion.         General: Signs of injury present. No deformity. Normal range of motion.      Comments: Left hand:  +ve about 1.5cm laceration over web between thumb and index.   FROM at thumb, index finger, wrist.  Neurovascular intact   Neurological: He is alert and oriented to person, place, and time. He exhibits normal muscle tone. Coordination normal.   Skin: Skin is warm, dry, intact, not diaphoretic and not pale.   Psychiatric: His speech is normal and behavior is normal. Judgment and thought content normal.   Nursing note and vitals reviewed.        Assessment:       1. Laceration of left hand without foreign body, initial encounter          Plan:         Laceration of left hand without foreign body, initial encounter  -     Laceration Repair    Other orders  -     cephALEXin (KEFLEX) 500 MG capsule; Take 1 capsule (500 mg total) by mouth every 12 (twelve) hours. for 7 days  Dispense: 14 capsule; Refill: 0

## 2022-07-09 NOTE — PROCEDURES
Laceration Repair    Date/Time: 7/9/2022 1:15 PM  Performed by: Chivo Valenzuela MD  Authorized by: Chivo Valenzuela MD   Body area: upper extremity  Location details: left hand  Laceration length: 1.5 cm  Foreign bodies: no foreign bodies  Tendon involvement: none  Nerve involvement: none  Vascular damage: no  Anesthesia: local infiltration    Anesthesia:  Local Anesthetic: lidocaine 1% without epinephrine  Anesthetic total: 3 mL    Patient sedated: no  Preparation: Patient was prepped and draped in the usual sterile fashion.  Irrigation solution: saline  Irrigation method: tap  Amount of cleaning: standard  Debridement: none  Degree of undermining: minimal  Skin closure: 4-0 Prolene  Number of sutures: 3  Technique: simple  Approximation: close  Approximation difficulty: simple  Dressing: antibiotic ointment, dressing applied and non-stick sterile dressing  Patient tolerance: Patient tolerated the procedure well with no immediate complications

## 2022-07-19 ENCOUNTER — OFFICE VISIT (OUTPATIENT)
Dept: URGENT CARE | Facility: CLINIC | Age: 67
End: 2022-07-19
Payer: MEDICARE

## 2022-07-19 VITALS
BODY MASS INDEX: 29.57 KG/M2 | SYSTOLIC BLOOD PRESSURE: 141 MMHG | TEMPERATURE: 98 F | HEART RATE: 76 BPM | RESPIRATION RATE: 18 BRPM | OXYGEN SATURATION: 99 % | HEIGHT: 66 IN | DIASTOLIC BLOOD PRESSURE: 84 MMHG | WEIGHT: 184 LBS

## 2022-07-19 DIAGNOSIS — Z48.02 VISIT FOR SUTURE REMOVAL: ICD-10-CM

## 2022-07-19 DIAGNOSIS — S61.412D LACERATION OF LEFT HAND WITHOUT FOREIGN BODY, SUBSEQUENT ENCOUNTER: Primary | ICD-10-CM

## 2022-07-19 PROCEDURE — 99499 NO LOS: ICD-10-PCS | Mod: S$GLB,,, | Performed by: PHYSICIAN ASSISTANT

## 2022-07-19 PROCEDURE — 3077F SYST BP >= 140 MM HG: CPT | Mod: CPTII,S$GLB,, | Performed by: PHYSICIAN ASSISTANT

## 2022-07-19 PROCEDURE — 1125F AMNT PAIN NOTED PAIN PRSNT: CPT | Mod: CPTII,S$GLB,, | Performed by: PHYSICIAN ASSISTANT

## 2022-07-19 PROCEDURE — 1159F PR MEDICATION LIST DOCUMENTED IN MEDICAL RECORD: ICD-10-PCS | Mod: CPTII,S$GLB,, | Performed by: PHYSICIAN ASSISTANT

## 2022-07-19 PROCEDURE — 99024 SUTURE REMOVAL: ICD-10-PCS | Mod: S$GLB,,, | Performed by: PHYSICIAN ASSISTANT

## 2022-07-19 PROCEDURE — 3008F PR BODY MASS INDEX (BMI) DOCUMENTED: ICD-10-PCS | Mod: CPTII,S$GLB,, | Performed by: PHYSICIAN ASSISTANT

## 2022-07-19 PROCEDURE — 3077F PR MOST RECENT SYSTOLIC BLOOD PRESSURE >= 140 MM HG: ICD-10-PCS | Mod: CPTII,S$GLB,, | Performed by: PHYSICIAN ASSISTANT

## 2022-07-19 PROCEDURE — 1159F MED LIST DOCD IN RCRD: CPT | Mod: CPTII,S$GLB,, | Performed by: PHYSICIAN ASSISTANT

## 2022-07-19 PROCEDURE — 1157F PR ADVANCE CARE PLAN OR EQUIV PRESENT IN MEDICAL RECORD: ICD-10-PCS | Mod: CPTII,S$GLB,, | Performed by: PHYSICIAN ASSISTANT

## 2022-07-19 PROCEDURE — 3079F PR MOST RECENT DIASTOLIC BLOOD PRESSURE 80-89 MM HG: ICD-10-PCS | Mod: CPTII,S$GLB,, | Performed by: PHYSICIAN ASSISTANT

## 2022-07-19 PROCEDURE — 99024 POSTOP FOLLOW-UP VISIT: CPT | Mod: S$GLB,,, | Performed by: PHYSICIAN ASSISTANT

## 2022-07-19 PROCEDURE — 1157F ADVNC CARE PLAN IN RCRD: CPT | Mod: CPTII,S$GLB,, | Performed by: PHYSICIAN ASSISTANT

## 2022-07-19 PROCEDURE — 3079F DIAST BP 80-89 MM HG: CPT | Mod: CPTII,S$GLB,, | Performed by: PHYSICIAN ASSISTANT

## 2022-07-19 PROCEDURE — 1125F PR PAIN SEVERITY QUANTIFIED, PAIN PRESENT: ICD-10-PCS | Mod: CPTII,S$GLB,, | Performed by: PHYSICIAN ASSISTANT

## 2022-07-19 PROCEDURE — 99499 UNLISTED E&M SERVICE: CPT | Mod: S$GLB,,, | Performed by: PHYSICIAN ASSISTANT

## 2022-07-19 PROCEDURE — 3008F BODY MASS INDEX DOCD: CPT | Mod: CPTII,S$GLB,, | Performed by: PHYSICIAN ASSISTANT

## 2022-07-19 RX ORDER — MUPIROCIN 20 MG/G
OINTMENT TOPICAL
Qty: 22 G | Refills: 0 | Status: SHIPPED | OUTPATIENT
Start: 2022-07-19

## 2022-07-19 NOTE — PROGRESS NOTES
"Subjective:       Patient ID: Aaron Guerra is a 67 y.o. male.    Vitals:  height is 5' 6" (1.676 m) and weight is 83.5 kg (184 lb). His temperature is 97.6 °F (36.4 °C). His blood pressure is 141/84 (abnormal) and his pulse is 76. His respiration is 18 and oxygen saturation is 99%.     Chief Complaint: Suture / Staple Removal    Patient presents for suture removal.  Sutures placed 10 days ago.  Denies any complaints.  No swelling, redness, discharge, pain, numbness.    Suture / Staple Removal  The sutures were placed 7 to 10 days ago. He tried oral antibiotics since the wound repair. The treatment provided significant relief. His temperature was unmeasured prior to arrival. There has been no drainage from the wound. There is no redness present. There is no swelling present. The pain has improved. He has no difficulty moving the affected extremity or digit.       Constitution: Negative for chills, sweating, fatigue and fever.   HENT: Negative for ear pain, congestion, postnasal drip and sore throat.    Neck: Negative for neck pain and neck stiffness.   Cardiovascular: Negative for chest pain, leg swelling and palpitations.   Eyes: Negative for eye itching, eye pain and eye redness.   Respiratory: Negative for cough, sputum production and shortness of breath.    Gastrointestinal: Negative for abdominal pain, nausea, vomiting and diarrhea.   Genitourinary: Negative for dysuria, frequency, urgency and flank pain.   Musculoskeletal: Negative for pain and joint pain.   Skin: Positive for laceration. Negative for color change, rash and wound.   Neurological: Negative for dizziness, light-headedness, facial drooping, headaches, altered mental status, numbness and tingling.   Psychiatric/Behavioral: Negative for altered mental status and confusion.       Objective:      Physical Exam   Constitutional: He is oriented to person, place, and time. He appears well-developed.  Non-toxic appearance. He does not appear ill. No " distress.   HENT:   Head: Normocephalic and atraumatic.   Ears:   Right Ear: External ear normal.   Left Ear: External ear normal.   Eyes: Conjunctivae are normal. Right eye exhibits no discharge. Left eye exhibits no discharge. No scleral icterus.   Pulmonary/Chest: Effort normal. No stridor. No respiratory distress. He has no wheezes.   Musculoskeletal:        Hands:    Neurological: He is alert and oriented to person, place, and time.   Skin: Skin is not diaphoretic.   Psychiatric: His behavior is normal. Judgment and thought content normal.   Nursing note and vitals reviewed.        Assessment:       1. Laceration of left hand without foreign body, subsequent encounter    2. Visit for suture removal          Plan:         Laceration of left hand without foreign body, subsequent encounter  -     mupirocin (BACTROBAN) 2 % ointment; Apply to affected area 3 times daily  Dispense: 22 g; Refill: 0    Visit for suture removal  -     Suture Removal    discussed home care, follow up precautions. All questions answered. 3 sutures removed easily, abx ointment and bandaid applied in clinic.   Patient Instructions   - Rest.    - Drink plenty of fluids.    - Acetaminophen (tylenol) or Ibuprofen (advil,motrin) as directed as needed for fever/pain. Avoid tylenol if you have a history of liver disease. Do not take ibuprofen if you have a history of GI bleeding, kidney disease, or if you take blood thinners.     - wash gently with soap and water  - otherwise, keep as clean as possible  - apply antibiotic ointment three times daily    - watch for signs of infection including increased redness, swelling, discharge, increased pain, red streaking, fever.  Seek medical attention immediately if these occur.    - Follow up with your PCP or specialty clinic as directed in the next 1-2 weeks if not improved or as needed.  You can call [phone] to schedule an appointment with the appropriate provider.    - Go to the ER or seek  medical attention immediately if you develop new or worsening symptoms.     - You must understand that you have received an Urgent Care treatment only and that you may be released before all of your medical problems are known or treated.   - You, the patient, will arrange for follow up care as instructed.   - If your condition worsens or fails to improve we recommend that you receive another evaluation at the ER immediately or contact your PCP to discuss your concerns or return here.

## 2022-07-19 NOTE — PATIENT INSTRUCTIONS
- Rest.    - Drink plenty of fluids.    - Acetaminophen (tylenol) or Ibuprofen (advil,motrin) as directed as needed for fever/pain. Avoid tylenol if you have a history of liver disease. Do not take ibuprofen if you have a history of GI bleeding, kidney disease, or if you take blood thinners.     - wash gently with soap and water  - otherwise, keep as clean as possible  - apply antibiotic ointment three times daily    - watch for signs of infection including increased redness, swelling, discharge, increased pain, red streaking, fever.  Seek medical attention immediately if these occur.    - Follow up with your PCP or specialty clinic as directed in the next 1-2 weeks if not improved or as needed.  You can call [phone] to schedule an appointment with the appropriate provider.    - Go to the ER or seek medical attention immediately if you develop new or worsening symptoms.     - You must understand that you have received an Urgent Care treatment only and that you may be released before all of your medical problems are known or treated.   - You, the patient, will arrange for follow up care as instructed.   - If your condition worsens or fails to improve we recommend that you receive another evaluation at the ER immediately or contact your PCP to discuss your concerns or return here.

## 2022-07-19 NOTE — PROCEDURES
Suture Removal    Date/Time: 7/19/2022 1:15 PM  Location procedure was performed: [facility]  Performed by: Ger Prince PA-C  Authorized by: Ger Prince PA-C   Body area: upper extremity  Location details: left hand  Wound Appearance: clean, well healed, normal color, nontender and no drainage  Sutures Removed: 3  Post-removal: bandaid applied and antibiotic ointment applied  Patient tolerance: Patient tolerated the procedure well with no immediate complications

## 2022-08-18 NOTE — PROVATION PATIENT INSTRUCTIONS
Discharge Summary/Instructions after an Endoscopic Procedure  Patient Name: Aaron Guerra  Patient MRN: [id number]  Patient YOB: 1955     Tuesday, December 22, 2020  Trenton Harris MD  RESTRICTIONS:  During your procedure today, you received medications for sedation.  These   medications may affect your judgment, balance and coordination.  Therefore,   for 24 hours, you have the following restrictions:   - DO NOT drive a car, operate machinery, make legal/financial decisions,   sign important papers or drink alcohol.    ACTIVITY:  Today: no heavy lifting, straining or running due to procedural   sedation/anesthesia.  The following day: return to full activity including work.  DIET:  Eat and drink normally unless instructed otherwise.     TREATMENT FOR COMMON SIDE EFFECTS:  - Mild abdominal pain, nausea, belching, bloating or excessive gas:  rest,   eat lightly and use a heating pad.  - Sore Throat: treat with throat lozenges and/or gargle with warm salt   water.  - Because air was used during the procedure, expelling large amounts of air   from your rectum or belching is normal.  - If a bowel prep was taken, you may not have a bowel movement for 1-3 days.    This is normal.  SYMPTOMS TO WATCH FOR AND REPORT TO YOUR PHYSICIAN:  1. Abdominal pain or bloating, other than gas cramps.  2. Chest pain.  3. Back pain.  4. Signs of infection such as: chills or fever occurring within 24 hours   after the procedure.  5. Rectal bleeding, which would show as bright red, maroon, or black stools.   (A tablespoon of blood from the rectum is not serious, especially if   hemorrhoids are present.)  6. Vomiting.  7. Weakness or dizziness.  GO DIRECTLY TO THE NEAREST EMERGENCY ROOM IF YOU HAVE ANY OF THE FOLLOWING:      Difficulty breathing              Chills and/or fever over 101 F   Persistent vomiting and/or vomiting blood   Severe abdominal pain   Severe chest pain   Black, tarry stools   Bleeding- more than one  tablespoon   Any other symptom or condition that you feel may need urgent attention  Your doctor recommends these additional instructions:  If any biopsies were taken, your doctors clinic will contact you in 1 to 2   weeks with any results.  - Discharge patient to home.   - High fiber diet indefinitely.   - Continue present medications.   - Use fiber, for example Citrucel, Fibercon, Konsyl or Metamucil.   - Repeat colonoscopy in 10 years for screening purposes.   - Return to referring physician as previously scheduled.   - Patient has a contact number available for emergencies.  The signs and   symptoms of potential delayed complications were discussed with the   patient.  Return to normal activities tomorrow.  Written discharge   instructions were provided to the patient.  For questions, problems or results please call your physician - Trenton Harris MD at Work:  [phone].  [facility], EMERGENCY ROOM PHONE NUMBER: [phone]  IF A COMPLICATION OR EMERGENCY SITUATION ARISES AND YOU ARE UNABLE TO REACH   YOUR PHYSICIAN - GO DIRECTLY TO THE EMERGENCY ROOM.  Trenton Harris MD  12/22/2020 12:23:55 PM  This report has been verified and signed electronically.  PROVATION   No

## 2022-10-06 ENCOUNTER — OFFICE VISIT (OUTPATIENT)
Dept: UROLOGY | Facility: CLINIC | Age: 67
End: 2022-10-06
Attending: UROLOGY
Payer: MEDICARE

## 2022-10-06 VITALS — HEART RATE: 79 BPM | SYSTOLIC BLOOD PRESSURE: 131 MMHG | DIASTOLIC BLOOD PRESSURE: 77 MMHG

## 2022-10-06 DIAGNOSIS — R97.20 ELEVATED PSA: ICD-10-CM

## 2022-10-06 PROCEDURE — 1157F PR ADVANCE CARE PLAN OR EQUIV PRESENT IN MEDICAL RECORD: ICD-10-PCS | Mod: CPTII,S$GLB,, | Performed by: UROLOGY

## 2022-10-06 PROCEDURE — 3288F PR FALLS RISK ASSESSMENT DOCUMENTED: ICD-10-PCS | Mod: CPTII,S$GLB,, | Performed by: UROLOGY

## 2022-10-06 PROCEDURE — 1157F ADVNC CARE PLAN IN RCRD: CPT | Mod: CPTII,S$GLB,, | Performed by: UROLOGY

## 2022-10-06 PROCEDURE — 3288F FALL RISK ASSESSMENT DOCD: CPT | Mod: CPTII,S$GLB,, | Performed by: UROLOGY

## 2022-10-06 PROCEDURE — 99214 PR OFFICE/OUTPT VISIT, EST, LEVL IV, 30-39 MIN: ICD-10-PCS | Mod: S$GLB,,, | Performed by: UROLOGY

## 2022-10-06 PROCEDURE — 1126F AMNT PAIN NOTED NONE PRSNT: CPT | Mod: CPTII,S$GLB,, | Performed by: UROLOGY

## 2022-10-06 PROCEDURE — 1101F PR PT FALLS ASSESS DOC 0-1 FALLS W/OUT INJ PAST YR: ICD-10-PCS | Mod: CPTII,S$GLB,, | Performed by: UROLOGY

## 2022-10-06 PROCEDURE — 3078F DIAST BP <80 MM HG: CPT | Mod: CPTII,S$GLB,, | Performed by: UROLOGY

## 2022-10-06 PROCEDURE — 99214 OFFICE O/P EST MOD 30 MIN: CPT | Mod: S$GLB,,, | Performed by: UROLOGY

## 2022-10-06 PROCEDURE — 1160F PR REVIEW ALL MEDS BY PRESCRIBER/CLIN PHARMACIST DOCUMENTED: ICD-10-PCS | Mod: CPTII,S$GLB,, | Performed by: UROLOGY

## 2022-10-06 PROCEDURE — 1101F PT FALLS ASSESS-DOCD LE1/YR: CPT | Mod: CPTII,S$GLB,, | Performed by: UROLOGY

## 2022-10-06 PROCEDURE — 1159F PR MEDICATION LIST DOCUMENTED IN MEDICAL RECORD: ICD-10-PCS | Mod: CPTII,S$GLB,, | Performed by: UROLOGY

## 2022-10-06 PROCEDURE — 3078F PR MOST RECENT DIASTOLIC BLOOD PRESSURE < 80 MM HG: ICD-10-PCS | Mod: CPTII,S$GLB,, | Performed by: UROLOGY

## 2022-10-06 PROCEDURE — 3075F PR MOST RECENT SYSTOLIC BLOOD PRESS GE 130-139MM HG: ICD-10-PCS | Mod: CPTII,S$GLB,, | Performed by: UROLOGY

## 2022-10-06 PROCEDURE — 1159F MED LIST DOCD IN RCRD: CPT | Mod: CPTII,S$GLB,, | Performed by: UROLOGY

## 2022-10-06 PROCEDURE — 1160F RVW MEDS BY RX/DR IN RCRD: CPT | Mod: CPTII,S$GLB,, | Performed by: UROLOGY

## 2022-10-06 PROCEDURE — 1126F PR PAIN SEVERITY QUANTIFIED, NO PAIN PRESENT: ICD-10-PCS | Mod: CPTII,S$GLB,, | Performed by: UROLOGY

## 2022-10-06 PROCEDURE — 3075F SYST BP GE 130 - 139MM HG: CPT | Mod: CPTII,S$GLB,, | Performed by: UROLOGY

## 2022-10-06 RX ORDER — TADALAFIL 20 MG/1
20 TABLET ORAL
Qty: 15 TABLET | Refills: 11 | Status: SHIPPED | OUTPATIENT
Start: 2022-10-06 | End: 2022-10-11 | Stop reason: SDUPTHER

## 2022-10-06 NOTE — PROGRESS NOTES
"Subjective:      Aaron Guerra is a 67 y.o. male who returns today regarding his BPH.    He is s/p Urolift 8/15/2018. No problems post-op.    Did well initially, however has been bothered by nocturia since his CPAP was recalled last year and he stopped using it. Still hasn't received replacement. His AUASS is 16/4.     The following portions of the patient's history were reviewed and updated as appropriate: allergies, current medications, past family history, past medical history, past social history, past surgical history and problem list.    Review of Systems  A comprehensive multipoint review of systems was negative except as otherwise stated in the HPI.     Objective:   Vitals: /85 (BP Location: Right arm, Patient Position: Sitting, BP Method: Large (Automatic))   Pulse 61   Ht 5' 11" (1.803 m)   Wt 109.6 kg (241 lb 10 oz)   BMI 33.70 kg/m²     Physical Exam   General: alert and oriented, no acute distress  Respiratory: Symmetric expansion, non-labored breathing  Neuro: no gross deficits  Psych: normal judgment and insight, normal mood/affect and non-anxious    Lab Review   Urinalysis demonstrates negative for all components  Lab Results   Component Value Date    WBC 4.18 06/21/2022    HGB 10.8 (L) 06/21/2022    HCT 36.6 (L) 06/21/2022    MCV 76 (L) 06/21/2022     06/21/2022     Lab Results   Component Value Date    CREATININE 1.4 06/21/2022    BUN 17 06/21/2022     Lab Results   Component Value Date    PSA 7.5 (H) 06/21/2022    PSADIAG 7.3 (H) 01/07/2020         Assessment and Plan:   1. Benign non-nodular prostatic hyperplasia with lower urinary tract symptoms  2. Nocturia  -- S/p Urolift  -- Recommend FU w/ sleep clinic re: CPAP    2. Elevated PSA  -- Stable    3. ED  -- Viagra PRN    FU 3 mos w/ PSA    "

## 2022-10-11 RX ORDER — TADALAFIL 20 MG/1
20 TABLET ORAL
Qty: 15 TABLET | Refills: 11 | Status: SHIPPED | OUTPATIENT
Start: 2022-10-11 | End: 2023-07-18

## 2022-12-27 ENCOUNTER — LAB VISIT (OUTPATIENT)
Dept: LAB | Facility: HOSPITAL | Age: 67
End: 2022-12-27
Attending: UROLOGY
Payer: MEDICARE

## 2022-12-27 DIAGNOSIS — R97.20 ELEVATED PSA: ICD-10-CM

## 2022-12-27 LAB
PROSTATE SPECIFIC ANTIGEN, TOTAL: 6.3 NG/ML (ref 0–4)
PSA FREE MFR SERPL: 31.43 %
PSA FREE SERPL-MCNC: 1.98 NG/ML (ref 0–1.5)

## 2022-12-27 PROCEDURE — 36415 COLL VENOUS BLD VENIPUNCTURE: CPT | Mod: HCNC,PN | Performed by: UROLOGY

## 2022-12-27 PROCEDURE — 84154 ASSAY OF PSA FREE: CPT | Mod: HCNC | Performed by: UROLOGY

## 2022-12-27 PROCEDURE — 84153 ASSAY OF PSA TOTAL: CPT | Mod: HCNC | Performed by: UROLOGY

## 2023-01-05 ENCOUNTER — OFFICE VISIT (OUTPATIENT)
Dept: UROLOGY | Facility: CLINIC | Age: 68
End: 2023-01-05
Attending: UROLOGY
Payer: MEDICARE

## 2023-01-05 VITALS
BODY MASS INDEX: 30.83 KG/M2 | DIASTOLIC BLOOD PRESSURE: 79 MMHG | OXYGEN SATURATION: 97 % | HEART RATE: 80 BPM | SYSTOLIC BLOOD PRESSURE: 128 MMHG | WEIGHT: 191 LBS

## 2023-01-05 DIAGNOSIS — R97.20 ELEVATED PSA: Primary | ICD-10-CM

## 2023-01-05 PROCEDURE — 3288F PR FALLS RISK ASSESSMENT DOCUMENTED: ICD-10-PCS | Mod: HCNC,CPTII,S$GLB, | Performed by: UROLOGY

## 2023-01-05 PROCEDURE — 3008F PR BODY MASS INDEX (BMI) DOCUMENTED: ICD-10-PCS | Mod: HCNC,CPTII,S$GLB, | Performed by: UROLOGY

## 2023-01-05 PROCEDURE — 1159F MED LIST DOCD IN RCRD: CPT | Mod: HCNC,CPTII,S$GLB, | Performed by: UROLOGY

## 2023-01-05 PROCEDURE — 3288F FALL RISK ASSESSMENT DOCD: CPT | Mod: HCNC,CPTII,S$GLB, | Performed by: UROLOGY

## 2023-01-05 PROCEDURE — 1157F ADVNC CARE PLAN IN RCRD: CPT | Mod: HCNC,CPTII,S$GLB, | Performed by: UROLOGY

## 2023-01-05 PROCEDURE — 3074F SYST BP LT 130 MM HG: CPT | Mod: HCNC,CPTII,S$GLB, | Performed by: UROLOGY

## 2023-01-05 PROCEDURE — 1101F PT FALLS ASSESS-DOCD LE1/YR: CPT | Mod: HCNC,CPTII,S$GLB, | Performed by: UROLOGY

## 2023-01-05 PROCEDURE — 3078F DIAST BP <80 MM HG: CPT | Mod: HCNC,CPTII,S$GLB, | Performed by: UROLOGY

## 2023-01-05 PROCEDURE — 1159F PR MEDICATION LIST DOCUMENTED IN MEDICAL RECORD: ICD-10-PCS | Mod: HCNC,CPTII,S$GLB, | Performed by: UROLOGY

## 2023-01-05 PROCEDURE — 1101F PR PT FALLS ASSESS DOC 0-1 FALLS W/OUT INJ PAST YR: ICD-10-PCS | Mod: HCNC,CPTII,S$GLB, | Performed by: UROLOGY

## 2023-01-05 PROCEDURE — 1126F AMNT PAIN NOTED NONE PRSNT: CPT | Mod: HCNC,CPTII,S$GLB, | Performed by: UROLOGY

## 2023-01-05 PROCEDURE — 99214 OFFICE O/P EST MOD 30 MIN: CPT | Mod: HCNC,S$GLB,, | Performed by: UROLOGY

## 2023-01-05 PROCEDURE — 3008F BODY MASS INDEX DOCD: CPT | Mod: HCNC,CPTII,S$GLB, | Performed by: UROLOGY

## 2023-01-05 PROCEDURE — 1157F PR ADVANCE CARE PLAN OR EQUIV PRESENT IN MEDICAL RECORD: ICD-10-PCS | Mod: HCNC,CPTII,S$GLB, | Performed by: UROLOGY

## 2023-01-05 PROCEDURE — 1160F RVW MEDS BY RX/DR IN RCRD: CPT | Mod: HCNC,CPTII,S$GLB, | Performed by: UROLOGY

## 2023-01-05 PROCEDURE — 1126F PR PAIN SEVERITY QUANTIFIED, NO PAIN PRESENT: ICD-10-PCS | Mod: HCNC,CPTII,S$GLB, | Performed by: UROLOGY

## 2023-01-05 PROCEDURE — 1160F PR REVIEW ALL MEDS BY PRESCRIBER/CLIN PHARMACIST DOCUMENTED: ICD-10-PCS | Mod: HCNC,CPTII,S$GLB, | Performed by: UROLOGY

## 2023-01-05 PROCEDURE — 3074F PR MOST RECENT SYSTOLIC BLOOD PRESSURE < 130 MM HG: ICD-10-PCS | Mod: HCNC,CPTII,S$GLB, | Performed by: UROLOGY

## 2023-01-05 PROCEDURE — 3078F PR MOST RECENT DIASTOLIC BLOOD PRESSURE < 80 MM HG: ICD-10-PCS | Mod: HCNC,CPTII,S$GLB, | Performed by: UROLOGY

## 2023-01-05 PROCEDURE — 99214 PR OFFICE/OUTPT VISIT, EST, LEVL IV, 30-39 MIN: ICD-10-PCS | Mod: HCNC,S$GLB,, | Performed by: UROLOGY

## 2023-01-05 NOTE — PROGRESS NOTES
"Subjective:      Aaron Guerra is a 67 y.o. male who returns today regarding his BPH.    He is s/p Urolift 8/15/2018. No problems post-op.    Did well initially, however has been bothered by nocturia since his CPAP was recalled last year and he stopped using it. He states today this is ongoing and he is still replacing replacement. His AUASS is 9/2.     The following portions of the patient's history were reviewed and updated as appropriate: allergies, current medications, past family history, past medical history, past social history, past surgical history and problem list.    Review of Systems  A comprehensive multipoint review of systems was negative except as otherwise stated in the HPI.     Objective:   Vitals: /85 (BP Location: Right arm, Patient Position: Sitting, BP Method: Large (Automatic))   Pulse 61   Ht 5' 11" (1.803 m)   Wt 109.6 kg (241 lb 10 oz)   BMI 33.70 kg/m²     Physical Exam   General: alert and oriented, no acute distress  Respiratory: Symmetric expansion, non-labored breathing  Neuro: no gross deficits  Psych: normal judgment and insight, normal mood/affect and non-anxious    Lab Review   Urinalysis demonstrates negative for all components  Lab Results   Component Value Date    WBC 4.18 06/21/2022    HGB 10.8 (L) 06/21/2022    HCT 36.6 (L) 06/21/2022    MCV 76 (L) 06/21/2022     06/21/2022     Lab Results   Component Value Date    CREATININE 1.4 06/21/2022    BUN 17 06/21/2022     Component      Latest Ref Rng & Units 12/27/2022   PSA Total      0.00 - 4.00 ng/mL 6.3 (H)   PSA, Free      0.00 - 1.50 ng/mL 1.98 (H)   PSA, Free %      Not established % 31.43       Assessment and Plan:   1. Benign non-nodular prostatic hyperplasia with lower urinary tract symptoms  2. Nocturia  -- S/p Urolift  -- Recommend FU w/ sleep clinic re: CPAP    2. Elevated PSA  -- Stable    3. ED  -- Cialis PRN    FU 6 mos w/ PSA      "

## 2023-02-07 DIAGNOSIS — Z00.00 ENCOUNTER FOR MEDICARE ANNUAL WELLNESS EXAM: ICD-10-CM

## 2023-02-09 DIAGNOSIS — Z00.00 ENCOUNTER FOR MEDICARE ANNUAL WELLNESS EXAM: ICD-10-CM

## 2023-03-06 ENCOUNTER — PATIENT MESSAGE (OUTPATIENT)
Dept: OPHTHALMOLOGY | Facility: CLINIC | Age: 68
End: 2023-03-06
Payer: MEDICARE

## 2023-04-21 ENCOUNTER — OFFICE VISIT (OUTPATIENT)
Dept: OPTOMETRY | Facility: CLINIC | Age: 68
End: 2023-04-21
Payer: MEDICARE

## 2023-04-21 DIAGNOSIS — H25.13 NUCLEAR SCLEROSIS OF BOTH EYES: Primary | ICD-10-CM

## 2023-04-21 PROCEDURE — 3288F FALL RISK ASSESSMENT DOCD: CPT | Mod: HCNC,CPTII,S$GLB, | Performed by: OPTOMETRIST

## 2023-04-21 PROCEDURE — 1101F PR PT FALLS ASSESS DOC 0-1 FALLS W/OUT INJ PAST YR: ICD-10-PCS | Mod: HCNC,CPTII,S$GLB, | Performed by: OPTOMETRIST

## 2023-04-21 PROCEDURE — 1159F MED LIST DOCD IN RCRD: CPT | Mod: HCNC,CPTII,S$GLB, | Performed by: OPTOMETRIST

## 2023-04-21 PROCEDURE — 1157F PR ADVANCE CARE PLAN OR EQUIV PRESENT IN MEDICAL RECORD: ICD-10-PCS | Mod: HCNC,CPTII,S$GLB, | Performed by: OPTOMETRIST

## 2023-04-21 PROCEDURE — 99999 PR PBB SHADOW E&M-EST. PATIENT-LVL III: CPT | Mod: PBBFAC,HCNC,, | Performed by: OPTOMETRIST

## 2023-04-21 PROCEDURE — 1126F AMNT PAIN NOTED NONE PRSNT: CPT | Mod: HCNC,CPTII,S$GLB, | Performed by: OPTOMETRIST

## 2023-04-21 PROCEDURE — 92004 PR EYE EXAM, NEW PATIENT,COMPREHESV: ICD-10-PCS | Mod: HCNC,S$GLB,, | Performed by: OPTOMETRIST

## 2023-04-21 PROCEDURE — 99999 PR PBB SHADOW E&M-EST. PATIENT-LVL III: ICD-10-PCS | Mod: PBBFAC,HCNC,, | Performed by: OPTOMETRIST

## 2023-04-21 PROCEDURE — 1157F ADVNC CARE PLAN IN RCRD: CPT | Mod: HCNC,CPTII,S$GLB, | Performed by: OPTOMETRIST

## 2023-04-21 PROCEDURE — 1126F PR PAIN SEVERITY QUANTIFIED, NO PAIN PRESENT: ICD-10-PCS | Mod: HCNC,CPTII,S$GLB, | Performed by: OPTOMETRIST

## 2023-04-21 PROCEDURE — 1101F PT FALLS ASSESS-DOCD LE1/YR: CPT | Mod: HCNC,CPTII,S$GLB, | Performed by: OPTOMETRIST

## 2023-04-21 PROCEDURE — 1159F PR MEDICATION LIST DOCUMENTED IN MEDICAL RECORD: ICD-10-PCS | Mod: HCNC,CPTII,S$GLB, | Performed by: OPTOMETRIST

## 2023-04-21 PROCEDURE — 3288F PR FALLS RISK ASSESSMENT DOCUMENTED: ICD-10-PCS | Mod: HCNC,CPTII,S$GLB, | Performed by: OPTOMETRIST

## 2023-04-21 PROCEDURE — 92004 COMPRE OPH EXAM NEW PT 1/>: CPT | Mod: HCNC,S$GLB,, | Performed by: OPTOMETRIST

## 2023-04-21 NOTE — PROGRESS NOTES
HPI    69 Y/o male is here for routine eye exam with no C/o about ocular health   Pt denies pain and discomfort   Pt states he sees occasional floaters   Eye med: no gtts  DARA approx. 10 years ago; no specs  Last edited by Luana Schwarz, OD on 4/21/2023  2:18 PM.            Assessment /Plan     For exam results, see Encounter Report.    Nuclear sclerosis of both eyes      Educated pt on findings. Not visually significant. No need for removal at this time. Monitor yearly.    Continue use of OTC reading glasses prn. Monitor yearly.      RTC in 1 year for annual eye exam unless changes noted sooner.

## 2023-07-05 ENCOUNTER — LAB VISIT (OUTPATIENT)
Dept: LAB | Facility: HOSPITAL | Age: 68
End: 2023-07-05
Attending: UROLOGY
Payer: MEDICARE

## 2023-07-05 DIAGNOSIS — R97.20 ELEVATED PSA: ICD-10-CM

## 2023-07-05 LAB
PROSTATE SPECIFIC ANTIGEN, TOTAL: 5.5 NG/ML (ref 0–4)
PSA FREE MFR SERPL: 26.55 %
PSA FREE SERPL-MCNC: 1.46 NG/ML (ref 0–1.5)

## 2023-07-05 PROCEDURE — 84153 ASSAY OF PSA TOTAL: CPT | Mod: HCNC | Performed by: UROLOGY

## 2023-07-05 PROCEDURE — 36415 COLL VENOUS BLD VENIPUNCTURE: CPT | Mod: HCNC,PN | Performed by: UROLOGY

## 2023-07-06 ENCOUNTER — OFFICE VISIT (OUTPATIENT)
Dept: UROLOGY | Facility: CLINIC | Age: 68
End: 2023-07-06
Attending: UROLOGY
Payer: MEDICARE

## 2023-07-06 ENCOUNTER — TELEPHONE (OUTPATIENT)
Dept: SLEEP MEDICINE | Facility: CLINIC | Age: 68
End: 2023-07-06
Payer: MEDICARE

## 2023-07-06 VITALS
DIASTOLIC BLOOD PRESSURE: 62 MMHG | HEART RATE: 87 BPM | WEIGHT: 192.56 LBS | BODY MASS INDEX: 30.95 KG/M2 | SYSTOLIC BLOOD PRESSURE: 118 MMHG | HEIGHT: 66 IN

## 2023-07-06 DIAGNOSIS — N40.1 BPH WITH OBSTRUCTION/LOWER URINARY TRACT SYMPTOMS: ICD-10-CM

## 2023-07-06 DIAGNOSIS — N13.8 BPH WITH OBSTRUCTION/LOWER URINARY TRACT SYMPTOMS: ICD-10-CM

## 2023-07-06 DIAGNOSIS — R97.20 ELEVATED PSA: Primary | ICD-10-CM

## 2023-07-06 PROCEDURE — 1126F AMNT PAIN NOTED NONE PRSNT: CPT | Mod: HCNC,CPTII,S$GLB, | Performed by: UROLOGY

## 2023-07-06 PROCEDURE — 1160F RVW MEDS BY RX/DR IN RCRD: CPT | Mod: HCNC,CPTII,S$GLB, | Performed by: UROLOGY

## 2023-07-06 PROCEDURE — 1101F PT FALLS ASSESS-DOCD LE1/YR: CPT | Mod: HCNC,CPTII,S$GLB, | Performed by: UROLOGY

## 2023-07-06 PROCEDURE — 3074F PR MOST RECENT SYSTOLIC BLOOD PRESSURE < 130 MM HG: ICD-10-PCS | Mod: HCNC,CPTII,S$GLB, | Performed by: UROLOGY

## 2023-07-06 PROCEDURE — 3008F BODY MASS INDEX DOCD: CPT | Mod: HCNC,CPTII,S$GLB, | Performed by: UROLOGY

## 2023-07-06 PROCEDURE — 1101F PR PT FALLS ASSESS DOC 0-1 FALLS W/OUT INJ PAST YR: ICD-10-PCS | Mod: HCNC,CPTII,S$GLB, | Performed by: UROLOGY

## 2023-07-06 PROCEDURE — 3008F PR BODY MASS INDEX (BMI) DOCUMENTED: ICD-10-PCS | Mod: HCNC,CPTII,S$GLB, | Performed by: UROLOGY

## 2023-07-06 PROCEDURE — 1160F PR REVIEW ALL MEDS BY PRESCRIBER/CLIN PHARMACIST DOCUMENTED: ICD-10-PCS | Mod: HCNC,CPTII,S$GLB, | Performed by: UROLOGY

## 2023-07-06 PROCEDURE — 3288F FALL RISK ASSESSMENT DOCD: CPT | Mod: HCNC,CPTII,S$GLB, | Performed by: UROLOGY

## 2023-07-06 PROCEDURE — 1159F PR MEDICATION LIST DOCUMENTED IN MEDICAL RECORD: ICD-10-PCS | Mod: HCNC,CPTII,S$GLB, | Performed by: UROLOGY

## 2023-07-06 PROCEDURE — 99213 PR OFFICE/OUTPT VISIT, EST, LEVL III, 20-29 MIN: ICD-10-PCS | Mod: HCNC,S$GLB,, | Performed by: UROLOGY

## 2023-07-06 PROCEDURE — 99213 OFFICE O/P EST LOW 20 MIN: CPT | Mod: HCNC,S$GLB,, | Performed by: UROLOGY

## 2023-07-06 PROCEDURE — 1159F MED LIST DOCD IN RCRD: CPT | Mod: HCNC,CPTII,S$GLB, | Performed by: UROLOGY

## 2023-07-06 PROCEDURE — 3288F PR FALLS RISK ASSESSMENT DOCUMENTED: ICD-10-PCS | Mod: HCNC,CPTII,S$GLB, | Performed by: UROLOGY

## 2023-07-06 PROCEDURE — 1157F PR ADVANCE CARE PLAN OR EQUIV PRESENT IN MEDICAL RECORD: ICD-10-PCS | Mod: HCNC,CPTII,S$GLB, | Performed by: UROLOGY

## 2023-07-06 PROCEDURE — 3078F PR MOST RECENT DIASTOLIC BLOOD PRESSURE < 80 MM HG: ICD-10-PCS | Mod: HCNC,CPTII,S$GLB, | Performed by: UROLOGY

## 2023-07-06 PROCEDURE — 3074F SYST BP LT 130 MM HG: CPT | Mod: HCNC,CPTII,S$GLB, | Performed by: UROLOGY

## 2023-07-06 PROCEDURE — 1126F PR PAIN SEVERITY QUANTIFIED, NO PAIN PRESENT: ICD-10-PCS | Mod: HCNC,CPTII,S$GLB, | Performed by: UROLOGY

## 2023-07-06 PROCEDURE — 3078F DIAST BP <80 MM HG: CPT | Mod: HCNC,CPTII,S$GLB, | Performed by: UROLOGY

## 2023-07-06 PROCEDURE — 1157F ADVNC CARE PLAN IN RCRD: CPT | Mod: HCNC,CPTII,S$GLB, | Performed by: UROLOGY

## 2023-07-06 NOTE — PROGRESS NOTES
"Subjective:      Aaron Guerra is a 68 y.o. male who returns today regarding his BPH.    He is s/p Urolift 8/15/2018. No problems post-op.    Did well initially, however has been bothered by nocturia since his CPAP was recalled last year and he stopped using it. He states today this is ongoing and he is still replacing replacement. (No change from last visit).    The following portions of the patient's history were reviewed and updated as appropriate: allergies, current medications, past family history, past medical history, past social history, past surgical history and problem list.    Review of Systems  A comprehensive multipoint review of systems was negative except as otherwise stated in the HPI.     Objective:   Vitals: /85 (BP Location: Right arm, Patient Position: Sitting, BP Method: Large (Automatic))   Pulse 61   Ht 5' 11" (1.803 m)   Wt 109.6 kg (241 lb 10 oz)   BMI 33.70 kg/m²     Physical Exam   General: alert and oriented, no acute distress  Respiratory: Symmetric expansion, non-labored breathing  Neuro: no gross deficits  Psych: normal judgment and insight, normal mood/affect and non-anxious    Lab Review   Urinalysis demonstrates negative for all components  Lab Results   Component Value Date    WBC 4.18 06/21/2022    HGB 10.8 (L) 06/21/2022    HCT 36.6 (L) 06/21/2022    MCV 76 (L) 06/21/2022     06/21/2022     Lab Results   Component Value Date    CREATININE 1.4 06/21/2022    BUN 17 06/21/2022     Component      Latest Ref Rng & Units 7/5/2023   PSA Total      0.00 - 4.00 ng/mL 5.5 (H)   PSA, Free      0.00 - 1.50 ng/mL 1.46   PSA, Free %      Not established % 26.55       Assessment and Plan:   1. Benign non-nodular prostatic hyperplasia with lower urinary tract symptoms  2. Nocturia  -- S/p Urolift  -- Recommend FU w/ sleep clinic re: CPAP    2. Elevated PSA  -- Stable    3. ED  -- Cialis PRN    FU 12 mos w/ PSA    "

## 2023-07-07 ENCOUNTER — TELEPHONE (OUTPATIENT)
Dept: UROLOGY | Facility: CLINIC | Age: 68
End: 2023-07-07
Payer: MEDICARE

## 2023-07-13 ENCOUNTER — OFFICE VISIT (OUTPATIENT)
Dept: SLEEP MEDICINE | Facility: CLINIC | Age: 68
End: 2023-07-13
Payer: MEDICARE

## 2023-07-13 VITALS
HEART RATE: 87 BPM | DIASTOLIC BLOOD PRESSURE: 89 MMHG | HEIGHT: 66 IN | WEIGHT: 192 LBS | SYSTOLIC BLOOD PRESSURE: 122 MMHG | BODY MASS INDEX: 30.86 KG/M2

## 2023-07-13 DIAGNOSIS — G47.33 OSA (OBSTRUCTIVE SLEEP APNEA): Primary | ICD-10-CM

## 2023-07-13 PROCEDURE — 3079F DIAST BP 80-89 MM HG: CPT | Mod: HCNC,CPTII,S$GLB, | Performed by: PHYSICIAN ASSISTANT

## 2023-07-13 PROCEDURE — 99214 OFFICE O/P EST MOD 30 MIN: CPT | Mod: HCNC,S$GLB,, | Performed by: PHYSICIAN ASSISTANT

## 2023-07-13 PROCEDURE — 99999 PR PBB SHADOW E&M-EST. PATIENT-LVL II: CPT | Mod: PBBFAC,HCNC,, | Performed by: PHYSICIAN ASSISTANT

## 2023-07-13 PROCEDURE — 3008F PR BODY MASS INDEX (BMI) DOCUMENTED: ICD-10-PCS | Mod: HCNC,CPTII,S$GLB, | Performed by: PHYSICIAN ASSISTANT

## 2023-07-13 PROCEDURE — 1157F PR ADVANCE CARE PLAN OR EQUIV PRESENT IN MEDICAL RECORD: ICD-10-PCS | Mod: HCNC,CPTII,S$GLB, | Performed by: PHYSICIAN ASSISTANT

## 2023-07-13 PROCEDURE — 99214 PR OFFICE/OUTPT VISIT, EST, LEVL IV, 30-39 MIN: ICD-10-PCS | Mod: HCNC,S$GLB,, | Performed by: PHYSICIAN ASSISTANT

## 2023-07-13 PROCEDURE — 1157F ADVNC CARE PLAN IN RCRD: CPT | Mod: HCNC,CPTII,S$GLB, | Performed by: PHYSICIAN ASSISTANT

## 2023-07-13 PROCEDURE — 99999 PR PBB SHADOW E&M-EST. PATIENT-LVL II: ICD-10-PCS | Mod: PBBFAC,HCNC,, | Performed by: PHYSICIAN ASSISTANT

## 2023-07-13 PROCEDURE — 3074F SYST BP LT 130 MM HG: CPT | Mod: HCNC,CPTII,S$GLB, | Performed by: PHYSICIAN ASSISTANT

## 2023-07-13 PROCEDURE — 3008F BODY MASS INDEX DOCD: CPT | Mod: HCNC,CPTII,S$GLB, | Performed by: PHYSICIAN ASSISTANT

## 2023-07-13 PROCEDURE — 3079F PR MOST RECENT DIASTOLIC BLOOD PRESSURE 80-89 MM HG: ICD-10-PCS | Mod: HCNC,CPTII,S$GLB, | Performed by: PHYSICIAN ASSISTANT

## 2023-07-13 PROCEDURE — 3074F PR MOST RECENT SYSTOLIC BLOOD PRESSURE < 130 MM HG: ICD-10-PCS | Mod: HCNC,CPTII,S$GLB, | Performed by: PHYSICIAN ASSISTANT

## 2023-07-13 NOTE — PROGRESS NOTES
Referred by No ref. provider found     ESTABLISHED  PATIENT VISIT  New to me. Previously evaluated by IMELDA Ye.       Aaron Guerra  is a pleasant 68 y.o. male  with PMH significant for HTN, hypercholesterolemia, GERD, DJD, AR, BPH, BMI 31+, ANGELIC who presents for evaluation of ANGELIC.      Here today for : needs new CPAP    PLAN last visit 1/5/22:   NEW machine CPAP 5- 15cm, TH SDME . RTC b/t 31-90d after setup adherence Avoid back sleep w/o machine  Discussed etiology of ANGELIC and potential ramifications of untreated ANGELIC, including heart disease, hypertension, cognitive difficulties, stroke, and diabetes.    See PCP re: HTN mgt as advised/continue meds      Since last visit:   Was using nightly with good control of symptoms until recall. States has not used since recall due to recall. Here today to discuss how to get new CPAP machine as he would like to resume nightly usage      PAP history   Problems    Mask F20   Pressure 5-15cwp   DME HME   Machine age 11/24/18   Download N/a       Past Medical History:   Diagnosis Date    Arthritis     BPH (benign prostatic hypertrophy)     Hypertension 2012    Sleep apnea      Patient Active Problem List   Diagnosis    Hypertension    Colon polyp    Allergic rhinitis    Benign prostatic hyperplasia with lower urinary tract symptoms    Hypercholesteremia    ANGELIC (obstructive sleep apnea)    [place] cardiac risk 10-20% in next 10 years    Primary osteoarthritis of left shoulder    DJD (degenerative joint disease), cervical    Elevated PSA    Beta thalassemia minor    History of solitary pulmonary nodule    Old bucket handle tear of lateral meniscus of left knee    Old bucket handle tear of medial meniscus of left knee    Gastroesophageal reflux disease without esophagitis    Class 1 obesity due to excess calories with serious comorbidity and body mass index (BMI) of 30.0 to 30.9 in adult    Corporo-venous occlusive erectile dysfunction    History of colon polyps    Primary  "osteoarthritis involving multiple joints       Current Outpatient Medications:     amLODIPine (NORVASC) 5 MG tablet, Take 1 tablet (5 mg total) by mouth once daily., Disp: 90 tablet, Rfl: 3    ascorbate calcium (VITAMIN C ORAL), Take 1 tablet by mouth once daily., Disp: , Rfl:     aspirin (ECOTRIN) 81 MG EC tablet, Take 81 mg by mouth once daily., Disp: , Rfl:     atorvastatin (LIPITOR) 40 MG tablet, Take 1 tablet (40 mg total) by mouth every evening., Disp: 90 tablet, Rfl: 3    fluticasone propionate (FLONASE) 50 mcg/actuation nasal spray, 1 spray (50 mcg total) by Each Nostril route once daily., Disp: 16 g, Rfl: 3    hydroCHLOROthiazide (MICROZIDE) 12.5 mg capsule, Take 1 capsule (12.5 mg total) by mouth once daily., Disp: 90 capsule, Rfl: 3    hydrOXYzine HCL (ATARAX) 25 MG tablet, Take 1 tablet (25 mg total) by mouth 3 (three) times daily as needed for Itching., Disp: 90 tablet, Rfl: 0    ketoconazole (NIZORAL) 2 % shampoo, Apply topically twice a week., Disp: 120 mL, Rfl: 1    mupirocin (BACTROBAN) 2 % ointment, Apply to affected area 3 times daily, Disp: 22 g, Rfl: 0    pantoprazole (PROTONIX) 40 MG tablet, Take 1 tablet (40 mg total) by mouth once daily. (Patient not taking: Reported on 10/6/2022), Disp: 90 tablet, Rfl: 3    tadalafiL (CIALIS) 20 MG Tab, Take 1 tablet (20 mg total) by mouth every 72 hours as needed (ED)., Disp: 15 tablet, Rfl: 11    tamsulosin (FLOMAX) 0.4 mg Cap, Take 1 capsule (0.4 mg total) by mouth once daily., Disp: 90 capsule, Rfl: 3    vitamin E acetate (VITAMIN E ORAL), Take 1 tablet by mouth once daily., Disp: , Rfl:        Vitals:    07/13/23 1135   BP: 122/89   BP Location: Right arm   Patient Position: Sitting   BP Method: Medium (Automatic)   Pulse: 87   Weight: 87.1 kg (192 lb)   Height: 5' 6" (1.676 m)     Physical Exam:    GEN:   Well-appearing  Psych:  Appropriate affect, demonstrates insight  SKIN:  No rash on the face or bridge of the nose      LABS:   No results found for: " HGB, CO2    RECORDS REVIEWED PREVIOUSLY:    18 HST: AHI 39, RDI 48    Respironics   ASSESSMENT    [place] Sleepiness Scale:  Sitting and readin  Watching TV:    2  Passenger in a car x 1 hr:  0  Sitting quietly after lunch:  2  Lying down to rest in PM:  1  Sitting, inactive in public:  1  Sitting+ talking to someone:  0  Stopped in traffic:   0  Total        PROBLEM DESCRIPTION/ Sx on Presentation Interval Hx STATUS    ANGELIC   Dx 2018 AHI 39   Not using since CPAP recall Not currently controlled    Daytime Sx   + sleepiness when inactive   ESS  on intake (reviewed 22) Was improved on CPAP, now worsening again since CPAP recall persists   Other issues:     PLAN     -using and benefiting from CPAP prior to recall  -discussed with patient options for new machine: register for recall replacement through [employer], order new machine and pay out of pocket; patient would like to register machine for recall    -use this link to register your CPAP machine for the recall:  [email or web address]  -CPAP order printer for recall replacement     -CPAP supplies ordered for when replacement is issued  -discussed ANGELIC and CPAP with patient in detail, including possible complications of untreated ANGELIC like heart attack/stroke  -advised on strict driving precautions; advised never to drive drowsy    Advised on plan of care. Answered all patient questions. Patient verbalized understanding and voiced agreement with plan of care.       RTC 6 months or as needed     The patient was given open opportunity to ask questions and/or express concerns about treatment plan.   All questions/concerns were discussed.     Two patient identifiers used prior to evaluation.

## 2023-07-18 ENCOUNTER — LAB VISIT (OUTPATIENT)
Dept: LAB | Facility: HOSPITAL | Age: 68
End: 2023-07-18
Attending: INTERNAL MEDICINE
Payer: MEDICARE

## 2023-07-18 ENCOUNTER — OFFICE VISIT (OUTPATIENT)
Dept: PRIMARY CARE CLINIC | Facility: CLINIC | Age: 68
End: 2023-07-18
Payer: MEDICARE

## 2023-07-18 VITALS
BODY MASS INDEX: 30.72 KG/M2 | TEMPERATURE: 98 F | RESPIRATION RATE: 18 BRPM | HEIGHT: 66 IN | HEART RATE: 79 BPM | SYSTOLIC BLOOD PRESSURE: 124 MMHG | OXYGEN SATURATION: 98 % | WEIGHT: 191.13 LBS | DIASTOLIC BLOOD PRESSURE: 84 MMHG

## 2023-07-18 DIAGNOSIS — I10 PRIMARY HYPERTENSION: ICD-10-CM

## 2023-07-18 DIAGNOSIS — Z87.898 HISTORY OF SOLITARY PULMONARY NODULE: ICD-10-CM

## 2023-07-18 DIAGNOSIS — E78.2 MIXED HYPERLIPIDEMIA: ICD-10-CM

## 2023-07-18 DIAGNOSIS — Z00.00 ROUTINE GENERAL MEDICAL EXAMINATION AT A HEALTH CARE FACILITY: ICD-10-CM

## 2023-07-18 DIAGNOSIS — I70.0 AORTIC ATHEROSCLEROSIS: ICD-10-CM

## 2023-07-18 DIAGNOSIS — Z76.0 ENCOUNTER FOR MEDICATION REFILL: ICD-10-CM

## 2023-07-18 DIAGNOSIS — Z91.89 FRAMINGHAM CARDIAC RISK 10-20% IN NEXT 10 YEARS: ICD-10-CM

## 2023-07-18 DIAGNOSIS — L21.9 SEBORRHEIC DERMATITIS: ICD-10-CM

## 2023-07-18 DIAGNOSIS — D56.3 BETA THALASSEMIA MINOR: ICD-10-CM

## 2023-07-18 DIAGNOSIS — R79.9 ABNORMAL FINDING OF BLOOD CHEMISTRY, UNSPECIFIED: ICD-10-CM

## 2023-07-18 DIAGNOSIS — R35.1 BENIGN PROSTATIC HYPERPLASIA WITH NOCTURIA: ICD-10-CM

## 2023-07-18 DIAGNOSIS — J30.89 NON-SEASONAL ALLERGIC RHINITIS DUE TO OTHER ALLERGIC TRIGGER: ICD-10-CM

## 2023-07-18 DIAGNOSIS — N40.1 BENIGN PROSTATIC HYPERPLASIA WITH NOCTURIA: ICD-10-CM

## 2023-07-18 DIAGNOSIS — M15.9 PRIMARY OSTEOARTHRITIS INVOLVING MULTIPLE JOINTS: ICD-10-CM

## 2023-07-18 DIAGNOSIS — Z00.00 ROUTINE GENERAL MEDICAL EXAMINATION AT A HEALTH CARE FACILITY: Primary | ICD-10-CM

## 2023-07-18 DIAGNOSIS — K21.9 GASTROESOPHAGEAL REFLUX DISEASE, UNSPECIFIED WHETHER ESOPHAGITIS PRESENT: ICD-10-CM

## 2023-07-18 DIAGNOSIS — G47.33 OSA (OBSTRUCTIVE SLEEP APNEA): ICD-10-CM

## 2023-07-18 LAB
ALBUMIN SERPL BCP-MCNC: 4 G/DL (ref 3.5–5.2)
ALP SERPL-CCNC: 101 U/L (ref 55–135)
ALT SERPL W/O P-5'-P-CCNC: 21 U/L (ref 10–44)
ANION GAP SERPL CALC-SCNC: 13 MMOL/L (ref 8–16)
AST SERPL-CCNC: 22 U/L (ref 10–40)
BASOPHILS # BLD AUTO: 0.03 K/UL (ref 0–0.2)
BASOPHILS NFR BLD: 0.7 % (ref 0–1.9)
BILIRUB SERPL-MCNC: 0.5 MG/DL (ref 0.1–1)
BUN SERPL-MCNC: 17 MG/DL (ref 8–23)
CALCIUM SERPL-MCNC: 9.3 MG/DL (ref 8.7–10.5)
CHLORIDE SERPL-SCNC: 107 MMOL/L (ref 95–110)
CHOLEST SERPL-MCNC: 143 MG/DL (ref 120–199)
CHOLEST/HDLC SERPL: 3.9 {RATIO} (ref 2–5)
CO2 SERPL-SCNC: 23 MMOL/L (ref 23–29)
CREAT SERPL-MCNC: 1.3 MG/DL (ref 0.5–1.4)
DIFFERENTIAL METHOD: ABNORMAL
EOSINOPHIL # BLD AUTO: 0.2 K/UL (ref 0–0.5)
EOSINOPHIL NFR BLD: 5.8 % (ref 0–8)
ERYTHROCYTE [DISTWIDTH] IN BLOOD BY AUTOMATED COUNT: 17.3 % (ref 11.5–14.5)
EST. GFR  (NO RACE VARIABLE): >60 ML/MIN/1.73 M^2
ESTIMATED AVG GLUCOSE: 120 MG/DL (ref 68–131)
GLUCOSE SERPL-MCNC: 96 MG/DL (ref 70–110)
HBA1C MFR BLD: 5.8 % (ref 4–5.6)
HCT VFR BLD AUTO: 39.3 % (ref 40–54)
HDLC SERPL-MCNC: 37 MG/DL (ref 40–75)
HDLC SERPL: 25.9 % (ref 20–50)
HGB BLD-MCNC: 11.9 G/DL (ref 14–18)
IMM GRANULOCYTES # BLD AUTO: 0.01 K/UL (ref 0–0.04)
IMM GRANULOCYTES NFR BLD AUTO: 0.2 % (ref 0–0.5)
LDLC SERPL CALC-MCNC: 94.2 MG/DL (ref 63–159)
LYMPHOCYTES # BLD AUTO: 1.6 K/UL (ref 1–4.8)
LYMPHOCYTES NFR BLD: 37.4 % (ref 18–48)
MCH RBC QN AUTO: 22.4 PG (ref 27–31)
MCHC RBC AUTO-ENTMCNC: 30.3 G/DL (ref 32–36)
MCV RBC AUTO: 74 FL (ref 82–98)
MONOCYTES # BLD AUTO: 0.4 K/UL (ref 0.3–1)
MONOCYTES NFR BLD: 9.8 % (ref 4–15)
NEUTROPHILS # BLD AUTO: 1.9 K/UL (ref 1.8–7.7)
NEUTROPHILS NFR BLD: 46.1 % (ref 38–73)
NONHDLC SERPL-MCNC: 106 MG/DL
NRBC BLD-RTO: 0 /100 WBC
PLATELET # BLD AUTO: 264 K/UL (ref 150–450)
PMV BLD AUTO: 9.6 FL (ref 9.2–12.9)
POTASSIUM SERPL-SCNC: 4.4 MMOL/L (ref 3.5–5.1)
PROT SERPL-MCNC: 7.6 G/DL (ref 6–8.4)
RBC # BLD AUTO: 5.32 M/UL (ref 4.6–6.2)
SODIUM SERPL-SCNC: 143 MMOL/L (ref 136–145)
TRIGL SERPL-MCNC: 59 MG/DL (ref 30–150)
TSH SERPL DL<=0.005 MIU/L-ACNC: 0.95 UIU/ML (ref 0.4–4)
WBC # BLD AUTO: 4.17 K/UL (ref 3.9–12.7)

## 2023-07-18 PROCEDURE — 80053 COMPREHEN METABOLIC PANEL: CPT | Mod: HCNC | Performed by: INTERNAL MEDICINE

## 2023-07-18 PROCEDURE — 1157F ADVNC CARE PLAN IN RCRD: CPT | Mod: HCNC,CPTII,S$GLB, | Performed by: INTERNAL MEDICINE

## 2023-07-18 PROCEDURE — 1101F PR PT FALLS ASSESS DOC 0-1 FALLS W/OUT INJ PAST YR: ICD-10-PCS | Mod: HCNC,CPTII,S$GLB, | Performed by: INTERNAL MEDICINE

## 2023-07-18 PROCEDURE — 99999 PR PBB SHADOW E&M-EST. PATIENT-LVL IV: CPT | Mod: PBBFAC,HCNC,, | Performed by: INTERNAL MEDICINE

## 2023-07-18 PROCEDURE — 3079F PR MOST RECENT DIASTOLIC BLOOD PRESSURE 80-89 MM HG: ICD-10-PCS | Mod: HCNC,CPTII,S$GLB, | Performed by: INTERNAL MEDICINE

## 2023-07-18 PROCEDURE — 3008F PR BODY MASS INDEX (BMI) DOCUMENTED: ICD-10-PCS | Mod: HCNC,CPTII,S$GLB, | Performed by: INTERNAL MEDICINE

## 2023-07-18 PROCEDURE — 84443 ASSAY THYROID STIM HORMONE: CPT | Mod: HCNC | Performed by: INTERNAL MEDICINE

## 2023-07-18 PROCEDURE — 1157F PR ADVANCE CARE PLAN OR EQUIV PRESENT IN MEDICAL RECORD: ICD-10-PCS | Mod: HCNC,CPTII,S$GLB, | Performed by: INTERNAL MEDICINE

## 2023-07-18 PROCEDURE — 99397 PR PREVENTIVE VISIT,EST,65 & OVER: ICD-10-PCS | Mod: HCNC,S$GLB,, | Performed by: INTERNAL MEDICINE

## 2023-07-18 PROCEDURE — 1159F MED LIST DOCD IN RCRD: CPT | Mod: HCNC,CPTII,S$GLB, | Performed by: INTERNAL MEDICINE

## 2023-07-18 PROCEDURE — 1160F PR REVIEW ALL MEDS BY PRESCRIBER/CLIN PHARMACIST DOCUMENTED: ICD-10-PCS | Mod: HCNC,CPTII,S$GLB, | Performed by: INTERNAL MEDICINE

## 2023-07-18 PROCEDURE — 3288F PR FALLS RISK ASSESSMENT DOCUMENTED: ICD-10-PCS | Mod: HCNC,CPTII,S$GLB, | Performed by: INTERNAL MEDICINE

## 2023-07-18 PROCEDURE — 3074F SYST BP LT 130 MM HG: CPT | Mod: HCNC,CPTII,S$GLB, | Performed by: INTERNAL MEDICINE

## 2023-07-18 PROCEDURE — 1101F PT FALLS ASSESS-DOCD LE1/YR: CPT | Mod: HCNC,CPTII,S$GLB, | Performed by: INTERNAL MEDICINE

## 2023-07-18 PROCEDURE — 1160F RVW MEDS BY RX/DR IN RCRD: CPT | Mod: HCNC,CPTII,S$GLB, | Performed by: INTERNAL MEDICINE

## 2023-07-18 PROCEDURE — 3079F DIAST BP 80-89 MM HG: CPT | Mod: HCNC,CPTII,S$GLB, | Performed by: INTERNAL MEDICINE

## 2023-07-18 PROCEDURE — 1159F PR MEDICATION LIST DOCUMENTED IN MEDICAL RECORD: ICD-10-PCS | Mod: HCNC,CPTII,S$GLB, | Performed by: INTERNAL MEDICINE

## 2023-07-18 PROCEDURE — 99999 PR PBB SHADOW E&M-EST. PATIENT-LVL IV: ICD-10-PCS | Mod: PBBFAC,HCNC,, | Performed by: INTERNAL MEDICINE

## 2023-07-18 PROCEDURE — 3074F PR MOST RECENT SYSTOLIC BLOOD PRESSURE < 130 MM HG: ICD-10-PCS | Mod: HCNC,CPTII,S$GLB, | Performed by: INTERNAL MEDICINE

## 2023-07-18 PROCEDURE — 36415 COLL VENOUS BLD VENIPUNCTURE: CPT | Mod: HCNC,PN | Performed by: INTERNAL MEDICINE

## 2023-07-18 PROCEDURE — 80061 LIPID PANEL: CPT | Mod: HCNC | Performed by: INTERNAL MEDICINE

## 2023-07-18 PROCEDURE — 3008F BODY MASS INDEX DOCD: CPT | Mod: HCNC,CPTII,S$GLB, | Performed by: INTERNAL MEDICINE

## 2023-07-18 PROCEDURE — 1126F PR PAIN SEVERITY QUANTIFIED, NO PAIN PRESENT: ICD-10-PCS | Mod: HCNC,CPTII,S$GLB, | Performed by: INTERNAL MEDICINE

## 2023-07-18 PROCEDURE — 85025 COMPLETE CBC W/AUTO DIFF WBC: CPT | Mod: HCNC | Performed by: INTERNAL MEDICINE

## 2023-07-18 PROCEDURE — 1126F AMNT PAIN NOTED NONE PRSNT: CPT | Mod: HCNC,CPTII,S$GLB, | Performed by: INTERNAL MEDICINE

## 2023-07-18 PROCEDURE — 99397 PER PM REEVAL EST PAT 65+ YR: CPT | Mod: HCNC,S$GLB,, | Performed by: INTERNAL MEDICINE

## 2023-07-18 PROCEDURE — 83036 HEMOGLOBIN GLYCOSYLATED A1C: CPT | Mod: HCNC | Performed by: INTERNAL MEDICINE

## 2023-07-18 PROCEDURE — 3288F FALL RISK ASSESSMENT DOCD: CPT | Mod: HCNC,CPTII,S$GLB, | Performed by: INTERNAL MEDICINE

## 2023-07-18 RX ORDER — ATORVASTATIN CALCIUM 40 MG/1
40 TABLET, FILM COATED ORAL NIGHTLY
Qty: 90 TABLET | Refills: 3 | Status: SHIPPED | OUTPATIENT
Start: 2023-07-18 | End: 2023-09-28 | Stop reason: SDUPTHER

## 2023-07-18 RX ORDER — AMLODIPINE BESYLATE 5 MG/1
5 TABLET ORAL DAILY
Qty: 90 TABLET | Refills: 3 | Status: SHIPPED | OUTPATIENT
Start: 2023-07-18 | End: 2023-09-28 | Stop reason: SDUPTHER

## 2023-07-18 RX ORDER — KETOCONAZOLE 20 MG/ML
SHAMPOO, SUSPENSION TOPICAL
Qty: 120 ML | Refills: 1 | Status: SHIPPED | OUTPATIENT
Start: 2023-07-20

## 2023-07-18 RX ORDER — HYDROCHLOROTHIAZIDE 12.5 MG/1
12.5 CAPSULE ORAL DAILY
Qty: 90 CAPSULE | Refills: 3 | Status: SHIPPED | OUTPATIENT
Start: 2023-07-18 | End: 2023-09-28 | Stop reason: SDUPTHER

## 2023-07-18 RX ORDER — FLUTICASONE PROPIONATE 50 MCG
2 SPRAY, SUSPENSION (ML) NASAL DAILY
Qty: 16 G | Refills: 5 | Status: SHIPPED | OUTPATIENT
Start: 2023-07-18

## 2023-07-18 NOTE — PROGRESS NOTES
Subjective:      Patient ID: Aaron Guerra is a 68 y.o. male.    Chief Complaint: Annual Exam      Aaron Guerra is a 68 y.o. male with chronic conditions significant for HTN, BPH, HLD, ANGELIC awaiting CPAP, elevated PSA, beta thalassemia minor, history of solid pulmonary nodule (now resolved based on CT scan from 2020)  Presenting today for follow up / annual. Date of last annual is 6/21/2022    HTN: BP well controlled. Office /84. Continues on amlodipine and HCTZ.  No CP/SOB/lightheadedness/dizziness/palpitations.      ANGELIC: Awaiting CPAP, he was told that there was issues with supplies due to COVID and has been waiting awhile. He did get the script from his sleep medicine doctor and is awaiting to get his machine.     Seasonal allergies: Renew flonase.      Beta thalassemia minor: Note mildly low CBC with low MCV. Continue to monitor.     Discussed increase in water intake due to mildly elevated creatinine that was seen. Discussed importance of controlling BP, avoiding nephrotoxic medications.    Colonoscopy - 12/22/2020, due 2030    Denies any chest pain, shortness of breath, nausea vomiting constipation diarrhea, blood in stool, heartburn    Review of Systems   Constitutional:  Negative for chills, fever and weight loss.   HENT:  Negative for congestion, ear pain and sore throat.    Eyes:  Negative for double vision.   Respiratory:  Negative for cough and shortness of breath.    Cardiovascular:  Negative for chest pain, palpitations and leg swelling.   Gastrointestinal:  Negative for abdominal pain, heartburn, nausea and vomiting.   Skin:  Negative for rash.   Neurological:  Negative for dizziness, tingling and headaches.   Psychiatric/Behavioral:  Negative for depression.         Current Outpatient Medications:     ascorbate calcium (VITAMIN C ORAL), Take 1 tablet by mouth once daily., Disp: , Rfl:     aspirin (ECOTRIN) 81 MG EC tablet, Take 81 mg by mouth once daily., Disp: , Rfl:     mupirocin (BACTROBAN) 2 %  ointment, Apply to affected area 3 times daily, Disp: 22 g, Rfl: 0    tadalafiL (CIALIS) 20 MG Tab, Take 1 tablet (20 mg total) by mouth every 72 hours as needed (ED)., Disp: 15 tablet, Rfl: 11    vitamin E acetate (VITAMIN E ORAL), Take 1 tablet by mouth once daily., Disp: , Rfl:     amLODIPine (NORVASC) 5 MG tablet, Take 1 tablet (5 mg total) by mouth once daily., Disp: 90 tablet, Rfl: 3    atorvastatin (LIPITOR) 40 MG tablet, Take 1 tablet (40 mg total) by mouth every evening., Disp: 90 tablet, Rfl: 3    fluticasone propionate (FLONASE) 50 mcg/actuation nasal spray, 2 sprays (100 mcg total) by Each Nostril route once daily., Disp: 16 g, Rfl: 5    hydroCHLOROthiazide (MICROZIDE) 12.5 mg capsule, Take 1 capsule (12.5 mg total) by mouth once daily., Disp: 90 capsule, Rfl: 3    [START ON 7/20/2023] ketoconazole (NIZORAL) 2 % shampoo, Apply topically twice a week., Disp: 120 mL, Rfl: 1    No results found for: HGBA1C  Lab Results   Component Value Date    MICALBCREAT 6.8 05/19/2021     Lab Results   Component Value Date    LDLCALC 102.2 11/29/2021    LDLCALC 104.0 05/19/2021    CHOL 158 11/29/2021    HDL 42 11/29/2021    TRIG 69 11/29/2021       Lab Results   Component Value Date     06/21/2022    K 3.8 06/21/2022     06/21/2022    CO2 27 06/21/2022    GLU 93 06/21/2022    BUN 17 06/21/2022    CREATININE 1.4 06/21/2022    CALCIUM 9.1 06/21/2022    PROT 7.0 06/21/2022    ALBUMIN 3.9 06/21/2022    BILITOT 0.8 06/21/2022    ALKPHOS 78 06/21/2022    AST 24 06/21/2022    ALT 21 06/21/2022    ANIONGAP 9 06/21/2022    ESTGFRAFRICA >60.0 06/21/2022    EGFRNONAA 52.0 (A) 06/21/2022    WBC 4.18 06/21/2022    HGB 10.8 (L) 06/21/2022    HGB 12.2 (L) 12/08/2021    HCT 36.6 (L) 06/21/2022    MCV 76 (L) 06/21/2022     06/21/2022    TSH 0.715 05/21/2018    PSA 7.5 (H) 06/21/2022    PSA 2.25 02/11/2012    PSADIAG 7.3 (H) 01/07/2020    PSADIAG 5.7 (H) 05/21/2018    HEPCAB Negative 05/21/2018       Lab Results    Component Value Date    HETGDUYV75BV 28 (L) 05/08/2012    JZHOTMVV98LF 18 (L) 02/11/2012    FERRITIN 121 05/29/2018    IRON 83 05/29/2018    TRANSFERRIN 258 05/29/2018    TIBC 382 05/29/2018    FESATURATED 22 05/29/2018         Past Medical History:   Diagnosis Date    Arthritis     BPH (benign prostatic hypertrophy)     Hypertension 2012    Sleep apnea      Past Surgical History:   Procedure Laterality Date    CHONDROPLASTY OF KNEE Left 9/22/2020    Procedure: CHONDROPLASTY, KNEE;  Surgeon: Whitley Rojo MD;  Location: [facility] OR;  Service: Orthopedics;  Laterality: Left;    COLONOSCOPY N/A 12/1/2015    Procedure: COLONOSCOPY;  Surgeon: Meagan Hill MD;  Location: [facility]  Service: Endoscopy;  Laterality: N/A;    COLONOSCOPY N/A 12/22/2020    Procedure: COLONOSCOPY;  Surgeon: Trenton Harris MD;  Location[facility]);  Service: Endoscopy;  Laterality: N/A;  covid test 12/14-[facility]    CYSTOSCOPY WITH INSERTION OF MINIMALLY INVASIVE IMPLANT TO ENLARGE PROSTATIC URETHRA N/A 8/15/2018    Procedure: TRANSPROSTATIC TISSUE RETRACTION;  Surgeon: Trenton Weathers MD;  Location: [facility] OR;  Service: Urology;  Laterality: N/A;    KNEE ARTHROSCOPY W/ MENISCECTOMY Left 9/22/2020    Procedure: ARTHROSCOPY, KNEE, WITH MENISCECTOMY;  Surgeon: Whitley Rojo MD;  Location: [facility] OR;  Service: Orthopedics;  Laterality: Left;    KNEE ARTHROSCOPY W/ PLICA EXCISION Left 9/22/2020    Procedure: EXCISION, PLICA, KNEE, ARTHROSCOPIC;  Surgeon: Whitley Rojo MD;  Location: [facility] OR;  Service: Orthopedics;  Laterality: Left;    SYNOVECTOMY OF KNEE Left 9/22/2020    Procedure: SYNOVECTOMY, KNEE;  Surgeon: Whitley Rojo MD;  Location: [facility] OR;  Service: Orthopedics;  Laterality: Left;    TONSILLECTOMY       Social History     Social History Narrative    The patient does not exercise regularly ().      He is not satisfied with weight.    Rates diet as good.    He does not drink at least 1/2 gallon water daily.    He  "drinks 2 coffee/tea/caffeine-containing soft drinks daily.    Total sleep time at night is 4-5 hours.    He does wear seat belts.    Hobbies include none.             Family History   Problem Relation Age of Onset    Cataracts Mother     Diabetes Mother     Hypertension Mother     Stroke Mother     Heart attack Sister     Drug abuse Brother     Hypertension Brother     Diabetes Sister     Hypertension Brother     Hypertension Sister      Vitals:    07/18/23 0926   BP: 124/84   Pulse: 79   Resp: 18   Temp: 98 °F (36.7 °C)   SpO2: 98%   Weight: 86.7 kg (191 lb 2.2 oz)   Height: 5' 6" (1.676 m)   PainSc: 0-No pain     Objective:   Physical Exam  Vitals and nursing note reviewed.   Constitutional:       Appearance: Normal appearance.   HENT:      Head: Normocephalic and atraumatic.      Right Ear: Tympanic membrane, ear canal and external ear normal.      Left Ear: Tympanic membrane, ear canal and external ear normal.      Nose: Nose normal.      Mouth/Throat:      Mouth: Mucous membranes are moist.      Pharynx: Oropharynx is clear.   Eyes:      Extraocular Movements: Extraocular movements intact.      Pupils: Pupils are equal, round, and reactive to light.   Cardiovascular:      Rate and Rhythm: Normal rate and regular rhythm.      Pulses: Normal pulses.      Heart sounds: Normal heart sounds.   Pulmonary:      Breath sounds: Normal breath sounds.   Abdominal:      General: Bowel sounds are normal.      Palpations: Abdomen is soft.   Musculoskeletal:      Cervical back: Normal range of motion and neck supple.   Skin:     General: Skin is warm.   Neurological:      General: No focal deficit present.      Mental Status: He is alert and oriented to person, place, and time.     Assessment/Plan     Aaron Guerra is a 68 y.o.male with:    Routine general medical examination at a health care facility  -     Hemoglobin A1C; Future; Expected date: 07/18/2023  -     CBC Auto Differential; Future; Expected date: 07/18/2023  -     " Comprehensive Metabolic Panel; Future; Expected date: 07/18/2023  -     TSH; Future; Expected date: 07/18/2023  -     Lipid Panel; Future; Expected date: 07/18/2023    Aortic atherosclerosis  -     atorvastatin (LIPITOR) 40 MG tablet; Take 1 tablet (40 mg total) by mouth every evening.  Dispense: 90 tablet; Refill: 3  -     Lipid Panel; Future; Expected date: 07/18/2023    Gastroesophageal reflux disease, unspecified whether esophagitis present    Encounter for medication refill  -     amLODIPine (NORVASC) 5 MG tablet; Take 1 tablet (5 mg total) by mouth once daily.  Dispense: 90 tablet; Refill: 3  -     hydroCHLOROthiazide (MICROZIDE) 12.5 mg capsule; Take 1 capsule (12.5 mg total) by mouth once daily.  Dispense: 90 capsule; Refill: 3    Primary hypertension  -     amLODIPine (NORVASC) 5 MG tablet; Take 1 tablet (5 mg total) by mouth once daily.  Dispense: 90 tablet; Refill: 3  -     hydroCHLOROthiazide (MICROZIDE) 12.5 mg capsule; Take 1 capsule (12.5 mg total) by mouth once daily.  Dispense: 90 capsule; Refill: 3  -     Comprehensive Metabolic Panel; Future; Expected date: 07/18/2023  -     TSH; Future; Expected date: 07/18/2023  -     Lipid Panel; Future; Expected date: 07/18/2023    Mixed hyperlipidemia  -     atorvastatin (LIPITOR) 40 MG tablet; Take 1 tablet (40 mg total) by mouth every evening.  Dispense: 90 tablet; Refill: 3  -     Lipid Panel; Future; Expected date: 07/18/2023    Non-seasonal allergic rhinitis due to other allergic trigger  -     fluticasone propionate (FLONASE) 50 mcg/actuation nasal spray; 2 sprays (100 mcg total) by Each Nostril route once daily.  Dispense: 16 g; Refill: 5    History of solitary pulmonary nodule    Beta thalassemia minor  -     CBC Auto Differential; Future; Expected date: 07/18/2023    [place] cardiac risk 10-20% in next 10 years    Seborrheic dermatitis  -     ketoconazole (NIZORAL) 2 % shampoo; Apply topically twice a week.  Dispense: 120 mL; Refill: 1    ANGELIC  (obstructive sleep apnea)  - awaiting CPAP    Primary osteoarthritis involving multiple joints    Benign prostatic hyperplasia with nocturia  - follows with urology    Abnormal finding of blood chemistry, unspecified  -     Hemoglobin A1C; Future; Expected date: 07/18/2023         Chronic conditions status updated as per HPI.  Other than changes above, cont current medications and maintain follow up with specialists.  Return to clinic in Follow up in about 1 year (around 7/18/2024).      Alyssia Park MD  [facility]    Patient Instructions   Labs are fasting. Please do not eat or drink anything other than water for 8-10 hrs prior to your lab work.    12 months for well visit or sooner if needed.   All of your core healthy metrics are met.

## 2023-07-19 NOTE — PROGRESS NOTES
Your blood count (CBC) is stable.    Your sugar number (Glucose) is within normal limits.  Your A1c is 5.8%. You do have prediabetes. This does not mean that you need medications, but you do need to start watching your diet and decrease the amount of sugar and carbohydrate intake. I would also recommend weight loss and regular exercise as well.    Rest of your electrolytes are unremarkable.    Your kidney (BUN, Creatinine and GFT) function is unremarkable.   Your liver (AST, ALT) function is unremarkable.  These are the filters in your body for medicine, food and liquids that you ingest.    Your Cholesterol is NORMAL. Continue to focus on low fat, high fiber foods and aerobic exericse (huffing & puffing) for at least 20 minutes most days of the week.    Your Thyroid numbers are normal.            FOR PRE-DIABETES, YOUR #1 MEDICATION IS EXERCISE --  ===============================================    Try to walk for a continuous 20 minutes every day - in your house or outside (huffing & puffing burns calories & strengthens your heart).     Be aware of everything you eat. Read labels.  Try writing it down so you can see where sugars & carbohydrates are creeping into your foods (drinks other than water, salad dressing, snacks)    Remember, all white bread, white flour (used in baking)  white pasta, white rice, white potatoes, chips, crackers, cookies, sweets, sodas (even Gatorade, Powerade,Koolaid) , sugary coffee & tea,  desserts ----TURN INTO SUGAR.

## 2023-09-28 DIAGNOSIS — I70.0 AORTIC ATHEROSCLEROSIS: ICD-10-CM

## 2023-09-28 DIAGNOSIS — I10 PRIMARY HYPERTENSION: ICD-10-CM

## 2023-09-28 DIAGNOSIS — E78.2 MIXED HYPERLIPIDEMIA: ICD-10-CM

## 2023-09-28 DIAGNOSIS — Z76.0 ENCOUNTER FOR MEDICATION REFILL: ICD-10-CM

## 2023-09-28 RX ORDER — AMLODIPINE BESYLATE 5 MG/1
5 TABLET ORAL DAILY
Qty: 90 TABLET | Refills: 3 | Status: SHIPPED | OUTPATIENT
Start: 2023-09-28

## 2023-09-28 RX ORDER — ATORVASTATIN CALCIUM 40 MG/1
40 TABLET, FILM COATED ORAL NIGHTLY
Qty: 90 TABLET | Refills: 3 | Status: SHIPPED | OUTPATIENT
Start: 2023-09-28

## 2023-09-28 RX ORDER — HYDROCHLOROTHIAZIDE 12.5 MG/1
12.5 CAPSULE ORAL DAILY
Qty: 90 CAPSULE | Refills: 3 | Status: SHIPPED | OUTPATIENT
Start: 2023-09-28

## 2023-09-28 NOTE — TELEPHONE ENCOUNTER
LOV 7/18/23  Spoke With Pt   PT would like Medication to go to [facility]  Please Cancel  RX at [facility]

## 2023-09-28 NOTE — TELEPHONE ENCOUNTER
No care due was identified.  [facility] Embedded Care Due Messages. Reference number: [id number].   9/28/2023 12:09:39 PM CDT

## 2023-11-28 ENCOUNTER — HOSPITAL ENCOUNTER (OUTPATIENT)
Dept: RADIOLOGY | Facility: HOSPITAL | Age: 68
Discharge: HOME | End: 2023-11-28
Attending: STUDENT IN AN ORGANIZED HEALTH CARE EDUCATION/TRAINING PROGRAM
Payer: MEDICARE

## 2023-11-28 ENCOUNTER — OFFICE VISIT (OUTPATIENT)
Dept: PRIMARY CARE CLINIC | Facility: CLINIC | Age: 68
End: 2023-11-28
Payer: MEDICARE

## 2023-11-28 VITALS
TEMPERATURE: 98 F | DIASTOLIC BLOOD PRESSURE: 76 MMHG | OXYGEN SATURATION: 96 % | WEIGHT: 196.19 LBS | HEART RATE: 80 BPM | SYSTOLIC BLOOD PRESSURE: 130 MMHG | BODY MASS INDEX: 31.53 KG/M2 | HEIGHT: 66 IN

## 2023-11-28 DIAGNOSIS — R05.2 SUBACUTE COUGH: ICD-10-CM

## 2023-11-28 DIAGNOSIS — R68.89 FLU-LIKE SYMPTOMS: Primary | ICD-10-CM

## 2023-11-28 LAB
CTP QC/QA: YES
CTP QC/QA: YES
FLUAV AG NPH QL: NEGATIVE
FLUBV AG NPH QL: NEGATIVE
SARS-COV-2 RDRP RESP QL NAA+PROBE: NEGATIVE

## 2023-11-28 PROCEDURE — 1159F MED LIST DOCD IN RCRD: CPT | Mod: HCNC,CPTII,S$GLB, | Performed by: STUDENT IN AN ORGANIZED HEALTH CARE EDUCATION/TRAINING PROGRAM

## 2023-11-28 PROCEDURE — 71046 X-RAY EXAM CHEST 2 VIEWS: CPT | Mod: TC,HCNC,PN

## 2023-11-28 PROCEDURE — 71046 XR CHEST PA AND LATERAL: ICD-10-PCS | Mod: 26,HCNC,, | Performed by: RADIOLOGY

## 2023-11-28 PROCEDURE — 3075F PR MOST RECENT SYSTOLIC BLOOD PRESS GE 130-139MM HG: ICD-10-PCS | Mod: HCNC,CPTII,S$GLB, | Performed by: STUDENT IN AN ORGANIZED HEALTH CARE EDUCATION/TRAINING PROGRAM

## 2023-11-28 PROCEDURE — 99214 OFFICE O/P EST MOD 30 MIN: CPT | Mod: HCNC,S$GLB,, | Performed by: STUDENT IN AN ORGANIZED HEALTH CARE EDUCATION/TRAINING PROGRAM

## 2023-11-28 PROCEDURE — 3078F PR MOST RECENT DIASTOLIC BLOOD PRESSURE < 80 MM HG: ICD-10-PCS | Mod: HCNC,CPTII,S$GLB, | Performed by: STUDENT IN AN ORGANIZED HEALTH CARE EDUCATION/TRAINING PROGRAM

## 2023-11-28 PROCEDURE — 99214 PR OFFICE/OUTPT VISIT, EST, LEVL IV, 30-39 MIN: ICD-10-PCS | Mod: HCNC,S$GLB,, | Performed by: STUDENT IN AN ORGANIZED HEALTH CARE EDUCATION/TRAINING PROGRAM

## 2023-11-28 PROCEDURE — 3288F FALL RISK ASSESSMENT DOCD: CPT | Mod: HCNC,CPTII,S$GLB, | Performed by: STUDENT IN AN ORGANIZED HEALTH CARE EDUCATION/TRAINING PROGRAM

## 2023-11-28 PROCEDURE — 1101F PT FALLS ASSESS-DOCD LE1/YR: CPT | Mod: HCNC,CPTII,S$GLB, | Performed by: STUDENT IN AN ORGANIZED HEALTH CARE EDUCATION/TRAINING PROGRAM

## 2023-11-28 PROCEDURE — 87635 SARS-COV-2 COVID-19 AMP PRB: CPT | Mod: QW,HCNC,S$GLB, | Performed by: STUDENT IN AN ORGANIZED HEALTH CARE EDUCATION/TRAINING PROGRAM

## 2023-11-28 PROCEDURE — 3008F BODY MASS INDEX DOCD: CPT | Mod: HCNC,CPTII,S$GLB, | Performed by: STUDENT IN AN ORGANIZED HEALTH CARE EDUCATION/TRAINING PROGRAM

## 2023-11-28 PROCEDURE — 99999 PR PBB SHADOW E&M-EST. PATIENT-LVL IV: ICD-10-PCS | Mod: PBBFAC,HCNC,, | Performed by: STUDENT IN AN ORGANIZED HEALTH CARE EDUCATION/TRAINING PROGRAM

## 2023-11-28 PROCEDURE — 1157F PR ADVANCE CARE PLAN OR EQUIV PRESENT IN MEDICAL RECORD: ICD-10-PCS | Mod: HCNC,CPTII,S$GLB, | Performed by: STUDENT IN AN ORGANIZED HEALTH CARE EDUCATION/TRAINING PROGRAM

## 2023-11-28 PROCEDURE — 3044F PR MOST RECENT HEMOGLOBIN A1C LEVEL <7.0%: ICD-10-PCS | Mod: HCNC,CPTII,S$GLB, | Performed by: STUDENT IN AN ORGANIZED HEALTH CARE EDUCATION/TRAINING PROGRAM

## 2023-11-28 PROCEDURE — 1125F PR PAIN SEVERITY QUANTIFIED, PAIN PRESENT: ICD-10-PCS | Mod: HCNC,CPTII,S$GLB, | Performed by: STUDENT IN AN ORGANIZED HEALTH CARE EDUCATION/TRAINING PROGRAM

## 2023-11-28 PROCEDURE — 1101F PR PT FALLS ASSESS DOC 0-1 FALLS W/OUT INJ PAST YR: ICD-10-PCS | Mod: HCNC,CPTII,S$GLB, | Performed by: STUDENT IN AN ORGANIZED HEALTH CARE EDUCATION/TRAINING PROGRAM

## 2023-11-28 PROCEDURE — 3078F DIAST BP <80 MM HG: CPT | Mod: HCNC,CPTII,S$GLB, | Performed by: STUDENT IN AN ORGANIZED HEALTH CARE EDUCATION/TRAINING PROGRAM

## 2023-11-28 PROCEDURE — 1125F AMNT PAIN NOTED PAIN PRSNT: CPT | Mod: HCNC,CPTII,S$GLB, | Performed by: STUDENT IN AN ORGANIZED HEALTH CARE EDUCATION/TRAINING PROGRAM

## 2023-11-28 PROCEDURE — 87804 INFLUENZA ASSAY W/OPTIC: CPT | Mod: 59,QW,HCNC,S$GLB | Performed by: STUDENT IN AN ORGANIZED HEALTH CARE EDUCATION/TRAINING PROGRAM

## 2023-11-28 PROCEDURE — 3288F PR FALLS RISK ASSESSMENT DOCUMENTED: ICD-10-PCS | Mod: HCNC,CPTII,S$GLB, | Performed by: STUDENT IN AN ORGANIZED HEALTH CARE EDUCATION/TRAINING PROGRAM

## 2023-11-28 PROCEDURE — 99999 PR PBB SHADOW E&M-EST. PATIENT-LVL IV: CPT | Mod: PBBFAC,HCNC,, | Performed by: STUDENT IN AN ORGANIZED HEALTH CARE EDUCATION/TRAINING PROGRAM

## 2023-11-28 PROCEDURE — 87804 POCT INFLUENZA A/B: ICD-10-PCS | Mod: 59,QW,HCNC,S$GLB | Performed by: STUDENT IN AN ORGANIZED HEALTH CARE EDUCATION/TRAINING PROGRAM

## 2023-11-28 PROCEDURE — 3075F SYST BP GE 130 - 139MM HG: CPT | Mod: HCNC,CPTII,S$GLB, | Performed by: STUDENT IN AN ORGANIZED HEALTH CARE EDUCATION/TRAINING PROGRAM

## 2023-11-28 PROCEDURE — 71046 X-RAY EXAM CHEST 2 VIEWS: CPT | Mod: 26,HCNC,, | Performed by: RADIOLOGY

## 2023-11-28 PROCEDURE — 1157F ADVNC CARE PLAN IN RCRD: CPT | Mod: HCNC,CPTII,S$GLB, | Performed by: STUDENT IN AN ORGANIZED HEALTH CARE EDUCATION/TRAINING PROGRAM

## 2023-11-28 PROCEDURE — 1159F PR MEDICATION LIST DOCUMENTED IN MEDICAL RECORD: ICD-10-PCS | Mod: HCNC,CPTII,S$GLB, | Performed by: STUDENT IN AN ORGANIZED HEALTH CARE EDUCATION/TRAINING PROGRAM

## 2023-11-28 PROCEDURE — 3008F PR BODY MASS INDEX (BMI) DOCUMENTED: ICD-10-PCS | Mod: HCNC,CPTII,S$GLB, | Performed by: STUDENT IN AN ORGANIZED HEALTH CARE EDUCATION/TRAINING PROGRAM

## 2023-11-28 PROCEDURE — 3044F HG A1C LEVEL LT 7.0%: CPT | Mod: HCNC,CPTII,S$GLB, | Performed by: STUDENT IN AN ORGANIZED HEALTH CARE EDUCATION/TRAINING PROGRAM

## 2023-11-28 PROCEDURE — 87635: ICD-10-PCS | Mod: QW,HCNC,S$GLB, | Performed by: STUDENT IN AN ORGANIZED HEALTH CARE EDUCATION/TRAINING PROGRAM

## 2023-11-28 RX ORDER — CEFPODOXIME PROXETIL 200 MG/1
200 TABLET, FILM COATED ORAL 2 TIMES DAILY
Qty: 10 TABLET | Refills: 0 | Status: SHIPPED | OUTPATIENT
Start: 2023-11-28 | End: 2023-12-03

## 2023-11-28 RX ORDER — AZITHROMYCIN 500 MG/1
500 TABLET, FILM COATED ORAL DAILY
Qty: 3 TABLET | Refills: 0 | Status: SHIPPED | OUTPATIENT
Start: 2023-11-28 | End: 2023-12-01

## 2023-11-28 RX ORDER — ALBUTEROL SULFATE 90 UG/1
2 AEROSOL, METERED RESPIRATORY (INHALATION) EVERY 6 HOURS PRN
Qty: 8 G | Refills: 0 | Status: SHIPPED | OUTPATIENT
Start: 2023-11-28 | End: 2023-12-12

## 2023-11-28 NOTE — PROGRESS NOTES
"Primary Care  Office Visit - In Person  11/28/2023      HPI    Patient is a 68 y.o.   Aaron Guerra  has a past medical history of Arthritis, BPH (benign prostatic hypertrophy), Hypertension (2012), and Sleep apnea.    Patient presents with     3-4 week history of productive cough   He denies any fever or chills   Symptoms are associated with body aches   Patient reports occasional chest tightening and wheezing   Patient non smoker   No sick contacts   COVID and flu vaccines comepleted 4 weeks ago     Active Medications:  Current Outpatient Medications   Medication Instructions    albuterol (VENTOLIN HFA) 90 mcg/actuation inhaler 2 puffs, Inhalation, Every 6 hours PRN, Rescue    amLODIPine (NORVASC) 5 mg, Oral, Daily    ascorbate calcium (VITAMIN C ORAL) 1 tablet, Oral, Daily    aspirin (ECOTRIN) 81 mg, Oral, Daily    atorvastatin (LIPITOR) 40 mg, Oral, Nightly    fluticasone propionate (FLONASE) 100 mcg, Each Nostril, Daily    hydroCHLOROthiazide (MICROZIDE) 12.5 mg, Oral, Daily    ketoconazole (NIZORAL) 2 % shampoo Topical (Top), Twice weekly    mupirocin (BACTROBAN) 2 % ointment Apply to affected area 3 times daily    tadalafiL (CIALIS) 20 mg, Oral, Every 72 hours PRN    vitamin E acetate (VITAMIN E ORAL) 1 tablet, Oral, Daily       Vitals:    11/28/23 0810   BP: 130/76   BP Location: Right arm   Pulse: 80   Temp: 98.2 °F (36.8 °C)   SpO2: 96%   Weight: 89 kg (196 lb 3.4 oz)   Height: 5' 6" (1.676 m)       Physical Exam  Vitals reviewed.   Constitutional:       General: He is not in acute distress.  Cardiovascular:      Rate and Rhythm: Normal rate and regular rhythm.      Pulses: Normal pulses.      Heart sounds: Normal heart sounds.   Pulmonary:      Effort: Pulmonary effort is normal. No respiratory distress.      Breath sounds: Normal breath sounds. No wheezing, rhonchi or rales.   Musculoskeletal:      Right lower leg: No edema.      Left lower leg: No edema.   Neurological:      General: No focal deficit " present.      Mental Status: He is oriented to person, place, and time.          Assessment and Plan     1. Flu-like symptoms  -     POCT Influenza A/B  -     POCT COVID-19 Rapid Screening; Future; Expected date: 11/28/2023  -     albuterol (VENTOLIN HFA) 90 mcg/actuation inhaler; Inhale 2 puffs into the lungs every 6 (six) hours as needed for Wheezing. Rescue  Dispense: 8 g; Refill: 0    2. Subacute cough  -     X-Ray Chest PA And Lateral; Future; Expected date: 11/28/2023                   Upcoming Scheduled Appointments and Follow Up:    Future Appointments   Date Time Provider Department Center   7/10/2024  9:15 AM LAB, [facility] LAB [facility]   7/11/2024  9:45 AM Trenton Weathers MD [facility] UROLOGY [facility]       Follow Up [facility] (with who? when?): No follow-ups on file.      Extended Emergency Contact Information  Primary Emergency Contact: Freya Guerra  Address: [place] [address] .           [address]  Home Phone: [phone]  Mobile Phone: [phone]  Relation: Spouse      Concepción Baker MD   Internal Medicine  11/28/2023 - 8:22 AM    I spent a total of 15 minutes on the day of the visit.This includes face to face time and non-face to face time preparing to see the patient (eg, review of tests), obtaining and/or reviewing separately obtained history, documenting clinical information in the electronic or other health record, independently interpreting results and communicating results to the patient/family/caregiver, or care coordinator.    While patients have the right to access their medical record, it is essential to recognize that progress notes primarily serve as a means of communication among healthcare professionals.

## 2023-11-28 NOTE — ADDENDUM NOTE
Addended by: DESHAUN MELGOZA on: 11/28/2023 10:42 AM     Modules accepted: Orders, Level of Service

## 2023-12-14 ENCOUNTER — TELEPHONE (OUTPATIENT)
Dept: SLEEP MEDICINE | Facility: CLINIC | Age: 68
End: 2023-12-14
Payer: MEDICARE

## 2023-12-14 NOTE — TELEPHONE ENCOUNTER
----- Message from Tory Goodman sent at 12/14/2023 12:29 PM CST -----  Regarding: appointment  Name of Who is Calling: Aaron           What is the request in detail: Patient is requesting a call back to schedule an appointment to do a sleep study.            Can the clinic reply by [facility]: Yes           [facility] [phone]    [facility] to return call

## 2023-12-27 ENCOUNTER — TELEPHONE (OUTPATIENT)
Dept: ADMINISTRATIVE | Facility: CLINIC | Age: 68
End: 2023-12-27
Payer: MEDICARE

## 2023-12-27 NOTE — TELEPHONE ENCOUNTER
Called pt; no answer; could not leave a message due to voice mail being full; I was calling to confirm pt's in office EAV appt on 12/29/23.

## 2023-12-29 ENCOUNTER — OFFICE VISIT (OUTPATIENT)
Dept: INTERNAL MEDICINE | Facility: CLINIC | Age: 68
End: 2023-12-29
Payer: MEDICARE

## 2023-12-29 VITALS
OXYGEN SATURATION: 97 % | HEIGHT: 66 IN | SYSTOLIC BLOOD PRESSURE: 122 MMHG | HEART RATE: 76 BPM | BODY MASS INDEX: 31.75 KG/M2 | WEIGHT: 197.56 LBS | DIASTOLIC BLOOD PRESSURE: 80 MMHG

## 2023-12-29 DIAGNOSIS — I70.0 AORTIC ATHEROSCLEROSIS: ICD-10-CM

## 2023-12-29 DIAGNOSIS — Z87.898 HISTORY OF SOLITARY PULMONARY NODULE: ICD-10-CM

## 2023-12-29 DIAGNOSIS — E66.09 CLASS 1 OBESITY DUE TO EXCESS CALORIES WITH SERIOUS COMORBIDITY AND BODY MASS INDEX (BMI) OF 30.0 TO 30.9 IN ADULT: ICD-10-CM

## 2023-12-29 DIAGNOSIS — K63.5 POLYP OF COLON, UNSPECIFIED PART OF COLON, UNSPECIFIED TYPE: ICD-10-CM

## 2023-12-29 DIAGNOSIS — G47.33 OSA (OBSTRUCTIVE SLEEP APNEA): ICD-10-CM

## 2023-12-29 DIAGNOSIS — E78.00 HYPERCHOLESTEREMIA: ICD-10-CM

## 2023-12-29 DIAGNOSIS — N40.1 BENIGN PROSTATIC HYPERPLASIA WITH NOCTURIA: ICD-10-CM

## 2023-12-29 DIAGNOSIS — M47.812 OSTEOARTHRITIS OF CERVICAL SPINE, UNSPECIFIED SPINAL OSTEOARTHRITIS COMPLICATION STATUS: Primary | ICD-10-CM

## 2023-12-29 DIAGNOSIS — Z91.89 FRAMINGHAM CARDIAC RISK 10-20% IN NEXT 10 YEARS: ICD-10-CM

## 2023-12-29 DIAGNOSIS — Z00.00 ENCOUNTER FOR PREVENTIVE HEALTH EXAMINATION: ICD-10-CM

## 2023-12-29 DIAGNOSIS — M19.012 PRIMARY OSTEOARTHRITIS OF LEFT SHOULDER: ICD-10-CM

## 2023-12-29 DIAGNOSIS — R35.1 BENIGN PROSTATIC HYPERPLASIA WITH NOCTURIA: ICD-10-CM

## 2023-12-29 DIAGNOSIS — M23.204 OLD BUCKET HANDLE TEAR OF MEDIAL MENISCUS OF LEFT KNEE: ICD-10-CM

## 2023-12-29 DIAGNOSIS — K21.9 GASTROESOPHAGEAL REFLUX DISEASE WITHOUT ESOPHAGITIS: ICD-10-CM

## 2023-12-29 DIAGNOSIS — M23.262 OLD BUCKET HANDLE TEAR OF LATERAL MENISCUS OF LEFT KNEE: ICD-10-CM

## 2023-12-29 DIAGNOSIS — Z86.010 HISTORY OF COLON POLYPS: ICD-10-CM

## 2023-12-29 DIAGNOSIS — N52.02 CORPORO-VENOUS OCCLUSIVE ERECTILE DYSFUNCTION: ICD-10-CM

## 2023-12-29 DIAGNOSIS — Z00.00 ENCOUNTER FOR MEDICARE ANNUAL WELLNESS EXAM: ICD-10-CM

## 2023-12-29 DIAGNOSIS — D56.3 BETA THALASSEMIA MINOR: ICD-10-CM

## 2023-12-29 DIAGNOSIS — J30.89 NON-SEASONAL ALLERGIC RHINITIS DUE TO OTHER ALLERGIC TRIGGER: ICD-10-CM

## 2023-12-29 DIAGNOSIS — R26.9 ABNORMALITY OF GAIT AND MOBILITY: ICD-10-CM

## 2023-12-29 DIAGNOSIS — I10 PRIMARY HYPERTENSION: ICD-10-CM

## 2023-12-29 DIAGNOSIS — R97.20 ELEVATED PSA: ICD-10-CM

## 2023-12-29 PROCEDURE — 1160F RVW MEDS BY RX/DR IN RCRD: CPT | Mod: HCNC,CPTII,S$GLB, | Performed by: NURSE PRACTITIONER

## 2023-12-29 PROCEDURE — 1160F PR REVIEW ALL MEDS BY PRESCRIBER/CLIN PHARMACIST DOCUMENTED: ICD-10-PCS | Mod: HCNC,CPTII,S$GLB, | Performed by: NURSE PRACTITIONER

## 2023-12-29 PROCEDURE — 3044F PR MOST RECENT HEMOGLOBIN A1C LEVEL <7.0%: ICD-10-PCS | Mod: HCNC,CPTII,S$GLB, | Performed by: NURSE PRACTITIONER

## 2023-12-29 PROCEDURE — 1126F AMNT PAIN NOTED NONE PRSNT: CPT | Mod: HCNC,CPTII,S$GLB, | Performed by: NURSE PRACTITIONER

## 2023-12-29 PROCEDURE — 3079F DIAST BP 80-89 MM HG: CPT | Mod: HCNC,CPTII,S$GLB, | Performed by: NURSE PRACTITIONER

## 2023-12-29 PROCEDURE — 1170F FXNL STATUS ASSESSED: CPT | Mod: HCNC,CPTII,S$GLB, | Performed by: NURSE PRACTITIONER

## 2023-12-29 PROCEDURE — 1159F MED LIST DOCD IN RCRD: CPT | Mod: HCNC,CPTII,S$GLB, | Performed by: NURSE PRACTITIONER

## 2023-12-29 PROCEDURE — 3074F SYST BP LT 130 MM HG: CPT | Mod: HCNC,CPTII,S$GLB, | Performed by: NURSE PRACTITIONER

## 2023-12-29 PROCEDURE — 99999 PR PBB SHADOW E&M-EST. PATIENT-LVL V: CPT | Mod: PBBFAC,HCNC,, | Performed by: NURSE PRACTITIONER

## 2023-12-29 PROCEDURE — 1126F PR PAIN SEVERITY QUANTIFIED, NO PAIN PRESENT: ICD-10-PCS | Mod: HCNC,CPTII,S$GLB, | Performed by: NURSE PRACTITIONER

## 2023-12-29 PROCEDURE — 3044F HG A1C LEVEL LT 7.0%: CPT | Mod: HCNC,CPTII,S$GLB, | Performed by: NURSE PRACTITIONER

## 2023-12-29 PROCEDURE — 99999 PR PBB SHADOW E&M-EST. PATIENT-LVL V: ICD-10-PCS | Mod: PBBFAC,HCNC,, | Performed by: NURSE PRACTITIONER

## 2023-12-29 PROCEDURE — G0439 PPPS, SUBSEQ VISIT: HCPCS | Mod: HCNC,S$GLB,, | Performed by: NURSE PRACTITIONER

## 2023-12-29 PROCEDURE — 1157F PR ADVANCE CARE PLAN OR EQUIV PRESENT IN MEDICAL RECORD: ICD-10-PCS | Mod: HCNC,CPTII,S$GLB, | Performed by: NURSE PRACTITIONER

## 2023-12-29 PROCEDURE — 1170F PR FUNCTIONAL STATUS ASSESSED: ICD-10-PCS | Mod: HCNC,CPTII,S$GLB, | Performed by: NURSE PRACTITIONER

## 2023-12-29 PROCEDURE — 3079F PR MOST RECENT DIASTOLIC BLOOD PRESSURE 80-89 MM HG: ICD-10-PCS | Mod: HCNC,CPTII,S$GLB, | Performed by: NURSE PRACTITIONER

## 2023-12-29 PROCEDURE — 1157F ADVNC CARE PLAN IN RCRD: CPT | Mod: HCNC,CPTII,S$GLB, | Performed by: NURSE PRACTITIONER

## 2023-12-29 PROCEDURE — 3074F PR MOST RECENT SYSTOLIC BLOOD PRESSURE < 130 MM HG: ICD-10-PCS | Mod: HCNC,CPTII,S$GLB, | Performed by: NURSE PRACTITIONER

## 2023-12-29 PROCEDURE — G0439 PR MEDICARE ANNUAL WELLNESS SUBSEQUENT VISIT: ICD-10-PCS | Mod: HCNC,S$GLB,, | Performed by: NURSE PRACTITIONER

## 2023-12-29 PROCEDURE — 3288F FALL RISK ASSESSMENT DOCD: CPT | Mod: HCNC,CPTII,S$GLB, | Performed by: NURSE PRACTITIONER

## 2023-12-29 PROCEDURE — 1101F PT FALLS ASSESS-DOCD LE1/YR: CPT | Mod: HCNC,CPTII,S$GLB, | Performed by: NURSE PRACTITIONER

## 2023-12-29 PROCEDURE — 1159F PR MEDICATION LIST DOCUMENTED IN MEDICAL RECORD: ICD-10-PCS | Mod: HCNC,CPTII,S$GLB, | Performed by: NURSE PRACTITIONER

## 2023-12-29 PROCEDURE — 1101F PR PT FALLS ASSESS DOC 0-1 FALLS W/OUT INJ PAST YR: ICD-10-PCS | Mod: HCNC,CPTII,S$GLB, | Performed by: NURSE PRACTITIONER

## 2023-12-29 PROCEDURE — 3288F PR FALLS RISK ASSESSMENT DOCUMENTED: ICD-10-PCS | Mod: HCNC,CPTII,S$GLB, | Performed by: NURSE PRACTITIONER

## 2023-12-29 NOTE — PATIENT INSTRUCTIONS
Counseling and Referral of Other Preventative  (Italic type indicates deductible and co-insurance are waived)    Patient Name: Aaron Guerra  Today's Date: 12/29/2023    Health Maintenance       Date Due Completion Date    RSV Vaccine (Age 60+ and Pregnant patients) (1 - 1-dose 60+ series) Never done ---    PROSTATE-SPECIFIC ANTIGEN 07/05/2024 7/5/2023    Lipid Panel 07/18/2024 7/18/2023    High Dose Statin 12/29/2024 12/29/2023    Pneumococcal Vaccines (Age 65+) (3 - PPSV23 or PCV20) 01/07/2025 1/7/2020    Hemoglobin A1c (Diabetic Prevention Screening) 07/18/2026 7/18/2023    TETANUS VACCINE 05/21/2028 5/21/2018    Colorectal Cancer Screening 12/22/2030 12/22/2020        No orders of the defined types were placed in this encounter.      The following information is provided to all patients.  This information is to help you find resources for any of the problems found today that may be affecting your health:                Living healthy guide: www.[facility].[place].gov      Understanding Diabetes: www.diabetes.org      Eating healthy: www.cdc.gov/healthyweight      CDC home safety checklist: www.cdc.gov/steadi/patient.html      Agency on Aging: www.goea.[place].gov      Alcoholics anonymous (AA): www.aa.org      Physical Activity: www.meseret.nih.gov/rq0dybn      Tobacco use: www.quitwithusla.org

## 2023-12-29 NOTE — PROGRESS NOTES
"  Aaron Guerra presented for a  Medicare AWV and comprehensive Health Risk Assessment today. The following components were reviewed and updated:    Medical history  Family History  Social history  Allergies and Current Medications  Health Risk Assessment  Health Maintenance  Care Team         ** See Completed Assessments for Annual Wellness Visit within the encounter summary.**         The following assessments were completed:  Living Situation  CAGE  Depression Screening  Timed Get Up and Go  Whisper Test  Cognitive Function Screening  Nutrition Screening  ADL Screening  PAQ Screening        Vitals:    12/29/23 1113   BP: 122/80   BP Location: Right arm   Patient Position: Sitting   Pulse: 76   SpO2: 97%   Weight: 89.6 kg (197 lb 8.5 oz)   Height: 5' 6" (1.676 m)     Body mass index is 31.88 kg/m².    Physical Exam  Vitals reviewed.   Constitutional:       Appearance: He is well-developed. He is obese.   HENT:      Head: Normocephalic and atraumatic. Not macrocephalic and not microcephalic. No raccoon eyes, [place]'s sign, abrasion, contusion, right periorbital erythema, left periorbital erythema or laceration. Hair is normal.      Right Ear: No decreased hearing noted. No laceration, drainage, swelling or tenderness. No middle ear effusion. No foreign body. No mastoid tenderness. No hemotympanum. Tympanic membrane is not injected, scarred, perforated, erythematous, retracted or bulging. Tympanic membrane has normal mobility.      Left Ear: No decreased hearing noted. No laceration, drainage, swelling or tenderness.  No middle ear effusion. No foreign body. No mastoid tenderness. No hemotympanum. Tympanic membrane is not injected, scarred, perforated, erythematous, retracted or bulging. Tympanic membrane has normal mobility.      Nose: Nose normal. No nasal deformity, laceration or mucosal edema.      Mouth/Throat:      Pharynx: Uvula midline.   Eyes:      General: Lids are normal.      Conjunctiva/sclera: Conjunctivae " normal.   Neck:      Thyroid: No thyroid mass or thyromegaly.      Trachea: Trachea normal.   Cardiovascular:      Rate and Rhythm: Normal rate and regular rhythm.   Pulmonary:      Effort: Pulmonary effort is normal.      Breath sounds: Normal breath sounds.   Abdominal:      Palpations: Abdomen is soft.   Musculoskeletal:         General: Normal range of motion.      Cervical back: Neck supple. No edema or erythema. No spinous process tenderness or muscular tenderness. Normal range of motion.   Lymphadenopathy:      Head:      Right side of head: No submental, submandibular, tonsillar, preauricular or posterior auricular adenopathy.      Left side of head: No submental, submandibular, tonsillar, preauricular, posterior auricular or occipital adenopathy.   Skin:     General: Skin is warm and dry.   Neurological:      Mental Status: He is alert and oriented to person, place, and time.      Cranial Nerves: No cranial nerve deficit.      Sensory: No sensory deficit.   Psychiatric:         Behavior: Behavior normal.         Thought Content: Thought content normal.         Judgment: Judgment normal.             Diagnoses and health risks identified today and associated recommendations/orders:    1. Encounter for Medicare annual wellness exam  Annual Health Risk Assessment (HRA) visit today.  Counseling and referral of health maintenance and preventative health measures performed.  Patient given annual wellness paperwork to take home.  Encouraged to return in 1 year for subsequent HRA visit.   - Ambulatory Referral/Consult to Enhanced Annual Wellness Visit (eAWV)    2. Osteoarthritis of cervical spine, unspecified spinal osteoarthritis complication status  Chronic. Stable. Continue current treatment plan as previously prescribed by PCP.    3. Non-seasonal allergic rhinitis due to other allergic trigger  Chronic. Stable. Continue current treatment plan as previously prescribed by PCP.    4. History of solitary pulmonary  nodule  Chronic. Stable. Continue current treatment plan as previously prescribed by PCP.    5. Aortic atherosclerosis  Chronic. Stable. Continue current treatment plan as previously prescribed by PCP.    6. [place] cardiac risk 10-20% in next 10 years  Chronic. Stable. Continue current treatment plan as previously prescribed by PCP.    7. Hypercholesteremia  Chronic. Stable. Continue current treatment plan as previously prescribed by PCP.    8. Primary hypertension  Chronic. Stable. Controlled. Encouraged to increase exercise as tolerated (moderate-intensity aerobic activity and muscle-strengthening activities) improve diet to heart healthy, low sodium diet.  Continue current treatment plan as previously prescribed by PCP.    9. Benign prostatic hyperplasia with nocturia  Chronic. Stable. Continue current treatment plan as previously prescribed by PCP.    10. Corporo-venous occlusive erectile dysfunction  Chronic. Stable. Continue current treatment plan as previously prescribed by PCP.    11. Elevated PSA  Chronic. Stable. Continue current treatment plan as previously prescribed by PCP.    12. Beta thalassemia minor  Chronic. Stable. Continue current treatment plan as previously prescribed by PCP.    13. Class 1 obesity due to excess calories with serious comorbidity and body mass index (BMI) of 30.0 to 30.9 in adult  Chronic. Stable. Encouraged to increase exercise as tolerated and improve diet to heart healthy, low sodium diet. /.8Continue current treatment plan as previously prescribed by PCP.    14. Polyp of colon, unspecified part of colon, unspecified type  Chronic. Stable. Continue current treatment plan as previously prescribed by PCP.    15. Gastroesophageal reflux disease without esophagitis  Chronic. Stable. Continue current treatment plan as previously prescribed by PCP.    16. History of colon polyps  Chronic. Stable. Continue current treatment plan as previously prescribed by PCP.    17. Old bucket  handle tear of lateral meniscus of left knee  Chronic. Stable. Continue current treatment plan as previously prescribed by PCP.    18. Old bucket handle tear of medial meniscus of left knee  Chronic. Stable. Continue current treatment plan as previously prescribed by PCP.    19. Primary osteoarthritis of left shoulder  Chronic. Stable. Continue current treatment plan as previously prescribed by PCP.    20. ANGELIC (obstructive sleep apnea)  Chronic. Stable. Continue current treatment plan as previously prescribed by PCP.          Provided Aaron with a 5-10 year written screening schedule and personal prevention plan. Recommendations were developed using the USPSTF age appropriate recommendations. Education, counseling, and referrals were provided as needed. After Visit Summary printed and given to patient which includes a list of additional screenings\tests needed.      I offered to discuss end of life issues, including information on how to make advance directives that the patient could use to name someone who would make medical decisions on their behalf if they became too ill to make themselves.    ___Patient declined  _X_Patient is interested, I provided paper work and offered to discuss.    No follow-ups on file.    Pepito Holland NP    I offered to discuss advanced care planning, including how to pick a person who would make decisions for you if you were unable to make them for yourself, called a health care power of , and what kind of decisions you might make such as use of life sustaining treatments such as ventilators and tube feeding when faced with a life limiting illness recorded on a living will that they will need to know. (How you want to be cared for as you near the end of your natural life)     X Patient is interested in learning more about how to make advanced directives.  I provided them paperwork and offered to discuss this with them.

## 2024-02-01 ENCOUNTER — OFFICE VISIT (OUTPATIENT)
Dept: URGENT CARE | Facility: CLINIC | Age: 69
End: 2024-02-01
Payer: MEDICARE

## 2024-02-01 VITALS
WEIGHT: 197.56 LBS | TEMPERATURE: 99 F | HEART RATE: 74 BPM | BODY MASS INDEX: 31.75 KG/M2 | RESPIRATION RATE: 16 BRPM | SYSTOLIC BLOOD PRESSURE: 148 MMHG | DIASTOLIC BLOOD PRESSURE: 86 MMHG | HEIGHT: 66 IN | OXYGEN SATURATION: 98 %

## 2024-02-01 DIAGNOSIS — Z20.822 CLOSE EXPOSURE TO COVID-19 VIRUS: ICD-10-CM

## 2024-02-01 DIAGNOSIS — U07.1 COVID-19: Primary | ICD-10-CM

## 2024-02-01 DIAGNOSIS — R05.9 COUGH, UNSPECIFIED TYPE: ICD-10-CM

## 2024-02-01 LAB
CTP QC/QA: YES
SARS-COV-2 AG RESP QL IA.RAPID: POSITIVE

## 2024-02-01 PROCEDURE — 99214 OFFICE O/P EST MOD 30 MIN: CPT | Mod: S$GLB,,, | Performed by: NURSE PRACTITIONER

## 2024-02-01 PROCEDURE — 87811 SARS-COV-2 COVID19 W/OPTIC: CPT | Mod: QW,S$GLB,, | Performed by: NURSE PRACTITIONER

## 2024-02-02 NOTE — PROGRESS NOTES
"Subjective:      Patient ID: Aaron Guerra is a 68 y.o. male.    Vitals:  height is 5' 6" (1.676 m) and weight is 89.6 kg (197 lb 8.5 oz). His oral temperature is 98.6 °F (37 °C). His blood pressure is 148/86 (abnormal) and his pulse is 74. His respiration is 16 and oxygen saturation is 98%.     Chief Complaint: Cough    Pt is a 67 yo male who reports that he has been having a cough for a month. Pt was given antibiotics a month ago for his cough and it improved. Pt has been having a cough and headaches. Pt states he felt warm over the weekend. Pt has been taking robitussin and mucinex. Pt wife tested positive for covid yesterday.    Cough  This is a recurrent problem. The current episode started more than 1 month ago. The problem has been unchanged. The problem occurs constantly. The cough is Non-productive. Associated symptoms include headaches. Pertinent negatives include no chest pain, chills, ear congestion, ear pain, fever, heartburn, hemoptysis, myalgias, nasal congestion, postnasal drip, rash, rhinorrhea, sore throat, shortness of breath, sweats, weight loss or wheezing. He has tried OTC cough suppressant (robitussin, mucinex) for the symptoms. The treatment provided mild relief. His past medical history is significant for pneumonia.       Constitution: Negative for chills and fever.   HENT:  Negative for ear pain, postnasal drip and sore throat.    Cardiovascular:  Negative for chest pain.   Respiratory:  Positive for cough. Negative for bloody sputum, shortness of breath and wheezing.    Gastrointestinal:  Negative for heartburn.   Musculoskeletal:  Negative for muscle ache.   Skin:  Negative for rash.   Neurological:  Positive for headaches.      Objective:     Physical Exam   Constitutional: He is oriented to person, place, and time. He appears well-developed. He is cooperative.  Non-toxic appearance. He does not appear ill. No distress.   HENT:   Head: Normocephalic and atraumatic.   Ears:   Right Ear: " Hearing, tympanic membrane, external ear and ear canal normal.   Left Ear: Hearing, tympanic membrane, external ear and ear canal normal.   Nose: Mucosal edema present. No rhinorrhea or nasal deformity. No epistaxis. Right sinus exhibits no maxillary sinus tenderness and no frontal sinus tenderness. Left sinus exhibits no maxillary sinus tenderness and no frontal sinus tenderness.   Mouth/Throat: Uvula is midline, oropharynx is clear and moist and mucous membranes are normal. No trismus in the jaw. Normal dentition. No uvula swelling. No oropharyngeal exudate, posterior oropharyngeal edema or posterior oropharyngeal erythema.   Eyes: Conjunctivae and lids are normal. No scleral icterus.   Neck: Trachea normal and phonation normal. Neck supple. No edema present. No erythema present. No neck rigidity present.   Cardiovascular: Normal rate, regular rhythm, normal heart sounds and normal pulses.   Pulmonary/Chest: Effort normal and breath sounds normal. No respiratory distress. He has no decreased breath sounds. He has no wheezes. He has no rhonchi.   cough         Comments: cough    Abdominal: Normal appearance.   Musculoskeletal: Normal range of motion.         General: No deformity. Normal range of motion.   Neurological: He is alert and oriented to person, place, and time. He exhibits normal muscle tone. Coordination normal.   Skin: Skin is warm, dry, intact, not diaphoretic and not pale.   Psychiatric: His speech is normal and behavior is normal. Judgment and thought content normal.   Nursing note and vitals reviewed.    Results for orders placed or performed in visit on 02/01/24   SARS Coronavirus 2 Antigen, POCT Manual Read   Result Value Ref Range    SARS Coronavirus 2 Antigen Positive (A) Negative     Acceptable Yes      Assessment:     1. COVID-19    2. Cough, unspecified type    3. Close exposure to COVID-19 virus        Plan:       COVID-19  -     nirmatrelvir-ritonavir 300 mg (150 mg x 2)-100  mg copackaged tablets (EUA); Take 3 tablets by mouth 2 (two) times daily for 5 days. Each dose contains 2 nirmatrelvir (pink tablets) and 1 ritonavir (white tablet). Take all 3 tablets together  Dispense: 30 tablet; Refill: 0    Cough, unspecified type  -     SARS Coronavirus 2 Antigen, POCT Manual Read    Close exposure to COVID-19 virus      Patient Instructions   - You must understand that you have received an Urgent Care treatment only and that you may be released before all of your medical problems are known or treated.   - You, the patient, will arrange for follow up care as instructed.   - If your condition worsens or fails to improve we recommend that you receive another evaluation at the ER immediately or contact your PCP to discuss your concerns.   - You can call [phone] or [phone] to help schedule an appointment with the appropriate provider.    Drink plenty of fluids   Get lots of rest  Tylenol or ibuprofen for pain/fever  Mucinex DM for cough  Saline nasal rinses to irrigate sinus cavities  Warm salt water gargles for sore throat    Isolation:   Stay home for 5 days.  If you have no symptoms or your symptoms are resolving after 5 days, you can leave your house.  Continue to wear a mask around others for 5 additional days.  If you have a fever, continue to stay home until you are fever free for over 24 hours without the use of fever reducing medications.     Do not take your ATORVASTATIN while taking PAXLOVID and for 3 days after completing treatment.   Monitor your blood pressure while taking PAXLOVID. This medication combined with your AMLODIPINE may cause your blood pressure to drop too low. Contact your healthcare provider if your pressure reading is <90/60

## 2024-02-02 NOTE — PATIENT INSTRUCTIONS
- You must understand that you have received an Urgent Care treatment only and that you may be released before all of your medical problems are known or treated.   - You, the patient, will arrange for follow up care as instructed.   - If your condition worsens or fails to improve we recommend that you receive another evaluation at the ER immediately or contact your PCP to discuss your concerns.   - You can call [phone] or [phone] to help schedule an appointment with the appropriate provider.    Drink plenty of fluids   Get lots of rest  Tylenol or ibuprofen for pain/fever  Mucinex DM for cough  Saline nasal rinses to irrigate sinus cavities  Warm salt water gargles for sore throat    Isolation:   Stay home for 5 days.  If you have no symptoms or your symptoms are resolving after 5 days, you can leave your house.  Continue to wear a mask around others for 5 additional days.  If you have a fever, continue to stay home until you are fever free for over 24 hours without the use of fever reducing medications.     Do not take your ATORVASTATIN while taking PAXLOVID and for 3 days after completing treatment.   Monitor your blood pressure while taking PAXLOVID. This medication combined with your AMLODIPINE may cause your blood pressure to drop too low. Contact your healthcare provider if your pressure reading is <90/60

## 2024-02-08 DIAGNOSIS — R35.1 BENIGN PROSTATIC HYPERPLASIA WITH NOCTURIA: ICD-10-CM

## 2024-02-08 DIAGNOSIS — N40.1 BENIGN PROSTATIC HYPERPLASIA WITH NOCTURIA: ICD-10-CM

## 2024-02-08 RX ORDER — TAMSULOSIN HYDROCHLORIDE 0.4 MG/1
0.4 CAPSULE ORAL DAILY
Qty: 90 CAPSULE | Refills: 3 | OUTPATIENT
Start: 2024-02-08

## 2024-02-08 NOTE — TELEPHONE ENCOUNTER
Refill Decision Note   Aaron Guerra  is requesting a refill authorization.  Brief Assessment and Rationale for Refill:  Quick Discontinue     Medication Therapy Plan:  Discontinued by: Alyssia Park MD on 7/18/2023      Comments:     Note composed:5:17 PM 02/08/2024

## 2024-02-08 NOTE — TELEPHONE ENCOUNTER
No care due was identified.  [facility] Embedded Care Due Messages. Reference number: [id number].   2/08/2024 1:42:25 PM CST

## 2024-02-09 DIAGNOSIS — J30.89 NON-SEASONAL ALLERGIC RHINITIS DUE TO OTHER ALLERGIC TRIGGER: ICD-10-CM

## 2024-02-09 RX ORDER — FLUTICASONE PROPIONATE 50 MCG
SPRAY, SUSPENSION (ML) NASAL
Refills: 0 | OUTPATIENT
Start: 2024-02-09

## 2024-02-09 NOTE — TELEPHONE ENCOUNTER
Refill Decision Note   Aaron Guerra  is requesting a refill authorization.  Brief Assessment and Rationale for Refill:  Quick Discontinue     Medication Therapy Plan: Pharmacy is requesting new scripts for the following medications without required information, (sig/ frequency/qty/etc)       Medication Reconciliation Completed: No   Comments: Pharmacies have been requesting medications for patients without required information, (sig, frequency, qty, etc.). In addition, requests are sent for medication(s) pt. are currently not taking, and medications patients have never taken.    We have spoken to the pharmacies about these request types and advised their teams previously that we are unable to assess these New Script requests and require all details for these requests. This is a known issue and has been reported.     No Care Gaps recommended.     Note composed:12:17 PM 02/09/2024

## 2024-02-09 NOTE — TELEPHONE ENCOUNTER
No care due was identified.  [facility] Embedded Care Due Messages. Reference number: [id number].   2/09/2024 10:24:39 AM CST

## 2024-02-16 ENCOUNTER — PATIENT MESSAGE (OUTPATIENT)
Dept: INTERNAL MEDICINE | Facility: CLINIC | Age: 69
End: 2024-02-16
Payer: MEDICARE

## 2024-04-06 ENCOUNTER — HOSPITAL ENCOUNTER (EMERGENCY)
Facility: OTHER | Age: 69
Discharge: HOME | End: 2024-04-06
Attending: EMERGENCY MEDICINE
Payer: MEDICARE

## 2024-04-06 VITALS
RESPIRATION RATE: 16 BRPM | WEIGHT: 189 LBS | SYSTOLIC BLOOD PRESSURE: 138 MMHG | HEIGHT: 66 IN | DIASTOLIC BLOOD PRESSURE: 84 MMHG | BODY MASS INDEX: 30.37 KG/M2 | TEMPERATURE: 98 F | OXYGEN SATURATION: 97 % | HEART RATE: 80 BPM

## 2024-04-06 DIAGNOSIS — S39.012A STRAIN OF LUMBAR REGION, INITIAL ENCOUNTER: ICD-10-CM

## 2024-04-06 DIAGNOSIS — R07.9 CHEST PAIN: ICD-10-CM

## 2024-04-06 DIAGNOSIS — B02.9 HERPES ZOSTER WITHOUT COMPLICATION: Primary | ICD-10-CM

## 2024-04-06 LAB
ALBUMIN SERPL BCP-MCNC: 4.1 G/DL (ref 3.5–5.2)
ALP SERPL-CCNC: 101 U/L (ref 55–135)
ALT SERPL W/O P-5'-P-CCNC: 25 U/L (ref 10–44)
ANION GAP SERPL CALC-SCNC: 11 MMOL/L (ref 8–16)
AST SERPL-CCNC: 25 U/L (ref 10–40)
BASOPHILS # BLD AUTO: 0.02 K/UL (ref 0–0.2)
BASOPHILS NFR BLD: 0.6 % (ref 0–1.9)
BILIRUB SERPL-MCNC: 0.8 MG/DL (ref 0.1–1)
BNP SERPL-MCNC: 14 PG/ML (ref 0–99)
BUN SERPL-MCNC: 16 MG/DL (ref 8–23)
CALCIUM SERPL-MCNC: 9.3 MG/DL (ref 8.7–10.5)
CHLORIDE SERPL-SCNC: 107 MMOL/L (ref 95–110)
CO2 SERPL-SCNC: 21 MMOL/L (ref 23–29)
CREAT SERPL-MCNC: 1.4 MG/DL (ref 0.5–1.4)
DIFFERENTIAL METHOD BLD: ABNORMAL
EOSINOPHIL # BLD AUTO: 0.1 K/UL (ref 0–0.5)
EOSINOPHIL NFR BLD: 4.1 % (ref 0–8)
ERYTHROCYTE [DISTWIDTH] IN BLOOD BY AUTOMATED COUNT: 16.1 % (ref 11.5–14.5)
EST. GFR  (NO RACE VARIABLE): 55 ML/MIN/1.73 M^2
GLUCOSE SERPL-MCNC: 90 MG/DL (ref 70–110)
HCT VFR BLD AUTO: 38.9 % (ref 40–54)
HCV AB SERPL QL IA: NEGATIVE
HGB BLD-MCNC: 12.1 G/DL (ref 14–18)
HIV 1+2 AB+HIV1 P24 AG SERPL QL IA: NEGATIVE
IMM GRANULOCYTES # BLD AUTO: 0.02 K/UL (ref 0–0.04)
IMM GRANULOCYTES NFR BLD AUTO: 0.6 % (ref 0–0.5)
LYMPHOCYTES # BLD AUTO: 1 K/UL (ref 1–4.8)
LYMPHOCYTES NFR BLD: 31.8 % (ref 18–48)
MCH RBC QN AUTO: 22 PG (ref 27–31)
MCHC RBC AUTO-ENTMCNC: 31.1 G/DL (ref 32–36)
MCV RBC AUTO: 71 FL (ref 82–98)
MONOCYTES # BLD AUTO: 0.4 K/UL (ref 0.3–1)
MONOCYTES NFR BLD: 12.6 % (ref 4–15)
NEUTROPHILS # BLD AUTO: 1.6 K/UL (ref 1.8–7.7)
NEUTROPHILS NFR BLD: 50.3 % (ref 38–73)
NRBC BLD-RTO: 0 /100 WBC
PLATELET # BLD AUTO: 246 K/UL (ref 150–450)
PMV BLD AUTO: 8.8 FL (ref 9.2–12.9)
POTASSIUM SERPL-SCNC: 4.2 MMOL/L (ref 3.5–5.1)
PROT SERPL-MCNC: 7.7 G/DL (ref 6–8.4)
RBC # BLD AUTO: 5.5 M/UL (ref 4.6–6.2)
SODIUM SERPL-SCNC: 139 MMOL/L (ref 136–145)
TROPONIN I SERPL DL<=0.01 NG/ML-MCNC: 0.01 NG/ML (ref 0–0.03)
TROPONIN I SERPL DL<=0.01 NG/ML-MCNC: 0.01 NG/ML (ref 0–0.03)
WBC # BLD AUTO: 3.18 K/UL (ref 3.9–12.7)

## 2024-04-06 PROCEDURE — 25000003 PHARM REV CODE 250: Mod: HCNC

## 2024-04-06 PROCEDURE — 96374 THER/PROPH/DIAG INJ IV PUSH: CPT | Mod: HCNC

## 2024-04-06 PROCEDURE — 93005 ELECTROCARDIOGRAM TRACING: CPT | Mod: HCNC

## 2024-04-06 PROCEDURE — 86803 HEPATITIS C AB TEST: CPT | Mod: HCNC | Performed by: EMERGENCY MEDICINE

## 2024-04-06 PROCEDURE — 84484 ASSAY OF TROPONIN QUANT: CPT | Mod: HCNC

## 2024-04-06 PROCEDURE — 85025 COMPLETE CBC W/AUTO DIFF WBC: CPT | Mod: HCNC

## 2024-04-06 PROCEDURE — 83880 ASSAY OF NATRIURETIC PEPTIDE: CPT | Mod: HCNC

## 2024-04-06 PROCEDURE — 99285 EMERGENCY DEPT VISIT HI MDM: CPT | Mod: 25,HCNC

## 2024-04-06 PROCEDURE — 63600175 PHARM REV CODE 636 W HCPCS: Mod: HCNC

## 2024-04-06 PROCEDURE — 87389 HIV-1 AG W/HIV-1&-2 AB AG IA: CPT | Mod: HCNC | Performed by: EMERGENCY MEDICINE

## 2024-04-06 PROCEDURE — 80053 COMPREHEN METABOLIC PANEL: CPT | Mod: HCNC

## 2024-04-06 PROCEDURE — 93010 ELECTROCARDIOGRAM REPORT: CPT | Mod: HCNC,,, | Performed by: INTERNAL MEDICINE

## 2024-04-06 RX ORDER — HYDROCODONE BITARTRATE AND ACETAMINOPHEN 5; 325 MG/1; MG/1
1 TABLET ORAL
Status: COMPLETED | OUTPATIENT
Start: 2024-04-06 | End: 2024-04-06

## 2024-04-06 RX ORDER — HYDROCODONE BITARTRATE AND ACETAMINOPHEN 5; 325 MG/1; MG/1
1 TABLET ORAL EVERY 6 HOURS PRN
Qty: 12 TABLET | Refills: 0 | Status: SHIPPED | OUTPATIENT
Start: 2024-04-06 | End: 2024-04-09

## 2024-04-06 RX ORDER — KETOROLAC TROMETHAMINE 30 MG/ML
10 INJECTION, SOLUTION INTRAMUSCULAR; INTRAVENOUS
Status: COMPLETED | OUTPATIENT
Start: 2024-04-06 | End: 2024-04-06

## 2024-04-06 RX ORDER — ORPHENADRINE CITRATE 100 MG/1
100 TABLET, EXTENDED RELEASE ORAL
Status: COMPLETED | OUTPATIENT
Start: 2024-04-06 | End: 2024-04-06

## 2024-04-06 RX ORDER — VALACYCLOVIR HYDROCHLORIDE 1 G/1
1000 TABLET, FILM COATED ORAL 3 TIMES DAILY
Qty: 21 TABLET | Refills: 0 | Status: SHIPPED | OUTPATIENT
Start: 2024-04-06 | End: 2024-05-16

## 2024-04-06 RX ADMIN — WHITE PETROLATUM 1 APPLICATION: 1.75 OINTMENT TOPICAL at 02:04

## 2024-04-06 RX ADMIN — KETOROLAC TROMETHAMINE 10 MG: 30 INJECTION, SOLUTION INTRAMUSCULAR; INTRAVENOUS at 10:04

## 2024-04-06 RX ADMIN — HYDROCODONE BITARTRATE AND ACETAMINOPHEN 1 TABLET: 5; 325 TABLET ORAL at 12:04

## 2024-04-06 RX ADMIN — ORPHENADRINE CITRATE 100 MG: 100 TABLET, EXTENDED RELEASE ORAL at 11:04

## 2024-04-06 NOTE — ED PROVIDER NOTES
"Source of History:  Patient    Chief complaint:  Back Pain (Lower back pain for the past two weeks. Admits to picking up a car linda before the pain started. Pain has not been relieved with Ibuprofen and Tylenol)      HPI:  Aaron Guerra is a 69 y.o. male presenting with back pain times 10 days.  Patient reports that he lifted a car Linda that was heavy approximately 10 days to 2 weeks ago.  He states it the next day, he began noticing pain to his lower back.  He describes the pain as a spasming pain.  Tried ibuprofen and Tylenol with minimal relief.  Denies any radiating pain.  Denies any bowel or bladder incontinence, saddle anesthesia, numbness, tingling.  He reports that he was using a heating pad for the pain 5 days ago, and he fell asleep with a heating pad on his back in a low setting.  He states that later that day, he noticed a rash to his left lower back.  He states that the rash has since formed blisters.  He describes the pain to the rash as a burning pain.  He states that it has extended around to his left abdomen.  Reports he last noticed new lesions yesterday.  Has been trying Kenalog cream and gold bond lotion with minimal relief.  He reports today he he feels like his back pain is radiating around to his chest.  He describes the chest pain as a pressure, "spasm" pain that feels similar to his back pain. Reports it worsens with certain movements, sometimes occurs at rest.  Reports some mild associated shortness of breath. Chest pain does not worsen with ambulation. States that these symptoms began last night while he was trying to sleep.  He denies any history of tobacco use.  Does report a family history of cardiovascular disease.     This is the extent to the patients complaints today here in the emergency department.    PMH:  As per HPI and below:  Past Medical History:   Diagnosis Date    Arthritis     BPH (benign prostatic hypertrophy)     Hypertension 2012    Sleep apnea      Past Surgical History: "   Procedure Laterality Date    CHONDROPLASTY OF KNEE Left 09/22/2020    Procedure: CHONDROPLASTY, KNEE;  Surgeon: Whitley Rojo MD;  Location: [facility] OR;  Service: Orthopedics;  Laterality: Left;    COLONOSCOPY N/A 12/01/2015    Procedure: COLONOSCOPY;  Surgeon: Meagan Hill MD;  Location: [facility] ENDO (4TH FLR);  Service: Endoscopy;  Laterality: N/A;    COLONOSCOPY N/A 12/22/2020    Procedure: COLONOSCOPY;  Surgeon: Trenton Harris MD;  Location: [facility] ENDO (4TH FLR);  Service: Endoscopy;  Laterality: N/A;  covid test 12/14-[facility]    CYSTOSCOPY WITH INSERTION OF MINIMALLY INVASIVE IMPLANT TO ENLARGE PROSTATIC URETHRA N/A 08/15/2018    Procedure: TRANSPROSTATIC TISSUE RETRACTION;  Surgeon: Trenton Weathers MD;  Location: [facility] OR;  Service: Urology;  Laterality: N/A;    KNEE ARTHROSCOPY W/ MENISCECTOMY Left 09/22/2020    Procedure: ARTHROSCOPY, KNEE, WITH MENISCECTOMY;  Surgeon: Whitley Rojo MD;  Location: [facility] OR;  Service: Orthopedics;  Laterality: Left;    KNEE ARTHROSCOPY W/ PLICA EXCISION Left 09/22/2020    Procedure: EXCISION, PLICA, KNEE, ARTHROSCOPIC;  Surgeon: Whitley Rojo MD;  Location: [facility] OR;  Service: Orthopedics;  Laterality: Left;    SYNOVECTOMY OF KNEE Left 09/22/2020    Procedure: SYNOVECTOMY, KNEE;  Surgeon: Whitley Rojo MD;  Location: [facility] OR;  Service: Orthopedics;  Laterality: Left;    TONSILLECTOMY         Social History     Tobacco Use    Smoking status: Never    Smokeless tobacco: Never   Substance Use Topics    Alcohol use: No    Drug use: No       Review of patient's allergies indicates:  No Known Allergies    ROS: As per HPI and below:  Constitutional: No fever.  No chills.  Eyes: No visual changes.  ENT: No sore throat. No ear pain.  Cardiovascular: + chest pain  Respiratory: + shortness of breath, chronic cough  GI: No abdominal pain.  No nausea or vomiting.  Genitourinary: No abnormal urination.  Neurologic: No headache. No focal weakness.  No numbness.  MSK: + back  "pain.  Integument: + rash    Physical Exam:    /74   Pulse 82   Temp 98.1 °F (36.7 °C) (Oral)   Resp 18   Ht 5' 6" (1.676 m)   Wt 85.7 kg (189 lb)   SpO2 98%   BMI 30.51 kg/m²   Vitals:    04/06/24 1047 04/06/24 1103 04/06/24 1203 04/06/24 1204   BP: (!) 155/89 (!) 143/78 117/74    Pulse: 84 82 82    Resp: 18 20  16   Temp:       TempSrc:       SpO2: 97% 97% 98%    Weight:       Height:        04/06/24 1205   BP:    Pulse:    Resp: 18   Temp:    TempSrc:    SpO2:    Weight:    Height:        Nursing note and vital signs reviewed.  Constitutional: No acute distress.  Well-appearing, non-toxic.  Eyes: No conjunctival injection.  Extraocular muscles are intact.  ENT: Oropharynx clear.   Cardiovascular:  Regular rate and rhythm no murmurs gallops or rubs.  Chest/ Respiratory:  Clear to auscultation bilaterally without wheezing rhonchi or rales.  No chest wall tenderness, crepitus, bruising.    Abdomen: Soft.  Not distended.  Nontender.  No guarding.  No rebound. Non-peritoneal.  Musculoskeletal: Diffuse tenderness to lumbar spine and left lumbar paraspinal muscles, no point tenderness, crepitus, step offs of lumbar spine. Gait intact. Sensation and strength to bilateral lower extremities intact.  Good range of motion all joints.  No deformities.  Neck supple.  No meningismus.  Skin: Unilateral vesicular rash present in dermatomal distribution to the left lower back/abdomen. Multiple groupings of vesicles, not  crusted over, no active drainage, on erythematous base. Diffusely tender.   Neuro: alert and oriented x3,  no focal neurological deficits.  Psych: Appropriate, conversant      Labs that have been ordered have been independently reviewed and interpreted by myself.    Labs Reviewed   CBC W/ AUTO DIFFERENTIAL - Abnormal; Notable for the following components:       Result Value    WBC 3.18 (*)     Hemoglobin 12.1 (*)     Hematocrit 38.9 (*)     MCV 71 (*)     MCH 22.0 (*)     MCHC 31.1 (*)     RDW 16.1 " (*)     MPV 8.8 (*)     Immature Granulocytes 0.6 (*)     Gran # (ANC) 1.6 (*)     All other components within normal limits   COMPREHENSIVE METABOLIC PANEL - Abnormal; Notable for the following components:    CO2 21 (*)     eGFR 55 (*)     All other components within normal limits   TROPONIN I   B-TYPE NATRIURETIC PEPTIDE   HIV 1 / 2 ANTIBODY    Narrative:     Release to patient->Immediate   HEPATITIS C ANTIBODY    Narrative:     Release to patient->Immediate   TROPONIN I       X-Ray Chest AP Portable   Final Result      No acute abnormality.         Electronically signed by: Kendra Machuca MD   Date:    04/06/2024   Time:    10:55          Results for orders placed or performed during the hospital encounter of 12/08/21   EKG 12-lead    Collection Time: 12/08/21  9:10 AM    Narrative    Test Reason : R07.9,    Vent. Rate : 069 BPM     Atrial Rate : 069 BPM     P-R Int : 162 ms          QRS Dur : 088 ms      QT Int : 394 ms       P-R-T Axes : 072 057 053 degrees     QTc Int : 422 ms    Normal sinus rhythm  Normal ECG    Confirmed by Bryanna MEJIA, Roni PARK ([id number]) on 12/8/2021 3:56:27 PM    Referred By: [email or web address]   SELF           Confirmed By:Roni Gould MD       Differential Diagnosis:  Differential Diagnosis includes, but is not limited to:  ACS/MI, PE, aortic dissection, pneumothorax, cardiac tamponade, pericarditis/myocarditis, pneumonia, infection/abscess, lung mass, costochondritis/pleurisy, herpes zoster infection, muscle strain/sprain, lumbar fracture, herniated disc, cauda equina syndrome    MDM  Medical Decision Making  Urgent evaluation of 69-year-old male with past medical history hypertension and hyperlipidemia presenting for evaluation of lower back pain times 10 days.  Also complaining of chest pain that began last night, at uncertain time.  Afebrile and hemodynamically stable.  Exam findings as described above.  Low back pain consistent with likely muscle strain, no sciatica, no red flag signs or  symptoms.  No indication for imaging at this time.  Rash to lower back/abdomen consistent with herpes zoster.  Given patient was reporting chest pain and has history of hypertension and hyperlipidemia, we will obtain cardiac workup to rule out ACS. We will give Toradol and orphenadrine for pain, reassess. See ED course for remainder of workup    Amount and/or Complexity of Data Reviewed  Labs: ordered. Decision-making details documented in ED Course.  Radiology: ordered.    Risk  OTC drugs.  Prescription drug management.        ED Course as of 04/06/24 1656   Sat Apr 06, 2024   1058 CXR no acute results.  [SW]   1134 CBC auto differential(!)  Notable for anemia consistent with baseline.  Mild leukopenia at 3.18. [SW]   1134 EKG normal sinus rhythm with a rate of 80 beats per minute.  No ST elevations or depressions.  No abnormal inverted T-waves.  No signs of acute ischemia.  When compared to prior EKG December 2021, no significant changes. [SW]   1138 --  EKG Interpretation: normal sinus rhythm at 80 beats per minute, no STEMI, no significant acute ST/T abnormalities, normal intervals.  No acute change compared to prior tracing.  --   [RC]   1152 Comprehensive metabolic panel(!)  No significant electrolyte abnormality, renal function appears at baseline. No elevation in liver enzymes. [SW]   1202 BNP: 14 [SW]   1202 Troponin I: 0.007 [SW]   1202 HIV 1/2 Ag/Ab: Negative [SW]   1202 Hepatitis C Ab: Negative [SW]   1402 Troponin I: 0.008 [SW]   1402 Troponin x2 negative.  Patient reports complete resolution of chest pain on my re-evaluation.  Patient does have risk factors for cardiac disease, however have low suspicion for acute ACS given reassuring workup, resolution of symptoms at this time.   Back pain improved with Norco.  Suspect back pain secondary to lumbar strain along with shingles infection.  We will discharge patient with Wesley for pain, valacyclovir for treatment of shingles given new lesions are still  occurring.  We will refer to Neurology for further evaluation of neuritis.  We will also recommend close follow up with PCP. Discussed my attending provider, Dr. Marino, who saw patient and is in agreement with plan.  [SW]   7570 I personally made/approved the management plan for this patient and take responsibility for the patient management.    [RC]      ED Course User Index  [RC] Arturo Marino MD  [SW] Michelle Suero PA-C       Diagnostic Impression:    1. Strain of lumbar region, initial encounter    2. Chest pain    3. Herpes zoster without complication                  Michelle Suero PA-C  04/06/24 9327       Arturo Marino MD  04/06/24 0991

## 2024-04-06 NOTE — DISCHARGE INSTRUCTIONS
You were seen in the ER for evaluation of back pain and chest pain.  Your cardiac workup today was very reassuring.  If you began experiencing new or worsening chest pain or shortness of breath, please return to the ER for re-evaluation.  You were diagnosed with a strain of the lumbar spine and with shingles today, I believe these are both contributing to your back pain.  I am referring you to neurology for further evaluation of nerve pain secondary to the shingles infection.  I would also like for you to follow up with your primary care provider within 1 week.  You should avoid contact with people who are immunocompromised or do not have a history of chickenpox or receiving the varicella vaccine, such as young children or pregnant women. Keep the lesions covered while out in public.  I am prescribing you Norco, which is a stronger pain medication, that you may take as needed for pain.  Please do not drive or work while taking this.  I am also prescribing you Valtrex, which is an antiviral to treat shingles. Please follow up with your PCP within one week. Return to the ER for any new or worsening symptoms.

## 2024-04-07 LAB
OHS QRS DURATION: 84 MS
OHS QTC CALCULATION: 419 MS

## 2024-04-09 ENCOUNTER — TELEPHONE (OUTPATIENT)
Dept: NEUROSURGERY | Facility: CLINIC | Age: 69
End: 2024-04-09
Payer: MEDICARE

## 2024-04-09 ENCOUNTER — OFFICE VISIT (OUTPATIENT)
Dept: PRIMARY CARE CLINIC | Facility: CLINIC | Age: 69
End: 2024-04-09
Payer: MEDICARE

## 2024-04-09 VITALS
SYSTOLIC BLOOD PRESSURE: 132 MMHG | DIASTOLIC BLOOD PRESSURE: 62 MMHG | BODY MASS INDEX: 30.29 KG/M2 | HEIGHT: 66 IN | WEIGHT: 188.5 LBS | OXYGEN SATURATION: 97 % | HEART RATE: 82 BPM | RESPIRATION RATE: 18 BRPM

## 2024-04-09 DIAGNOSIS — B02.9 HERPES ZOSTER WITHOUT COMPLICATION: Primary | ICD-10-CM

## 2024-04-09 DIAGNOSIS — I10 PRIMARY HYPERTENSION: ICD-10-CM

## 2024-04-09 PROCEDURE — 1159F MED LIST DOCD IN RCRD: CPT | Mod: HCNC,CPTII,S$GLB, | Performed by: FAMILY MEDICINE

## 2024-04-09 PROCEDURE — 3008F BODY MASS INDEX DOCD: CPT | Mod: HCNC,CPTII,S$GLB, | Performed by: FAMILY MEDICINE

## 2024-04-09 PROCEDURE — 99999 PR PBB SHADOW E&M-EST. PATIENT-LVL IV: CPT | Mod: PBBFAC,HCNC,, | Performed by: FAMILY MEDICINE

## 2024-04-09 PROCEDURE — 1101F PT FALLS ASSESS-DOCD LE1/YR: CPT | Mod: HCNC,CPTII,S$GLB, | Performed by: FAMILY MEDICINE

## 2024-04-09 PROCEDURE — 1160F RVW MEDS BY RX/DR IN RCRD: CPT | Mod: HCNC,CPTII,S$GLB, | Performed by: FAMILY MEDICINE

## 2024-04-09 PROCEDURE — 99214 OFFICE O/P EST MOD 30 MIN: CPT | Mod: HCNC,S$GLB,, | Performed by: FAMILY MEDICINE

## 2024-04-09 PROCEDURE — 1157F ADVNC CARE PLAN IN RCRD: CPT | Mod: HCNC,CPTII,S$GLB, | Performed by: FAMILY MEDICINE

## 2024-04-09 PROCEDURE — 3078F DIAST BP <80 MM HG: CPT | Mod: HCNC,CPTII,S$GLB, | Performed by: FAMILY MEDICINE

## 2024-04-09 PROCEDURE — 1126F AMNT PAIN NOTED NONE PRSNT: CPT | Mod: HCNC,CPTII,S$GLB, | Performed by: FAMILY MEDICINE

## 2024-04-09 PROCEDURE — 3288F FALL RISK ASSESSMENT DOCD: CPT | Mod: HCNC,CPTII,S$GLB, | Performed by: FAMILY MEDICINE

## 2024-04-09 PROCEDURE — 3075F SYST BP GE 130 - 139MM HG: CPT | Mod: HCNC,CPTII,S$GLB, | Performed by: FAMILY MEDICINE

## 2024-04-09 RX ORDER — HYDROCODONE BITARTRATE AND ACETAMINOPHEN 5; 325 MG/1; MG/1
1 TABLET ORAL EVERY 6 HOURS PRN
Qty: 12 TABLET | Refills: 0 | Status: SHIPPED | OUTPATIENT
Start: 2024-04-09 | End: 2024-05-01 | Stop reason: SDUPTHER

## 2024-04-09 RX ORDER — GABAPENTIN 100 MG/1
100 CAPSULE ORAL 3 TIMES DAILY
Qty: 90 CAPSULE | Refills: 0 | Status: SHIPPED | OUTPATIENT
Start: 2024-04-09 | End: 2024-05-01 | Stop reason: DRUGHIGH

## 2024-04-09 RX ORDER — INFLUENZA A VIRUS A/VICTORIA/4897/2022 IVR-238 (H1N1) ANTIGEN (FORMALDEHYDE INACTIVATED), INFLUENZA A VIRUS A/DARWIN/9/2021 SAN-010 (H3N2) ANTIGEN (FORMALDEHYDE INACTIVATED), INFLUENZA B VIRUS B/PHUKET/3073/2013 ANTIGEN (FORMALDEHYDE INACTIVATED), AND INFLUENZA B VIRUS B/MICHIGAN/01/2021 ANTIGEN (FORMALDEHYDE INACTIVATED) 60; 60; 60; 60 UG/.7ML; UG/.7ML; UG/.7ML; UG/.7ML
INJECTION, SUSPENSION INTRAMUSCULAR
COMMUNITY
Start: 2023-10-29 | End: 2024-04-09

## 2024-04-09 RX ORDER — COVID-19 VACCINE, MRNA 50 UG/.5ML
INJECTION, SUSPENSION INTRAMUSCULAR
COMMUNITY
Start: 2023-10-29 | End: 2024-05-01

## 2024-04-09 NOTE — TELEPHONE ENCOUNTER
Pt sent an inbox message requesting an earlier appt. After searching Dr. Read schedule, I did not find an appt earlier than he already has. I left a voice message stating this and that if he wished to see a different provider at a different location please let us know. I also put him on on the waitlist.

## 2024-04-10 PROBLEM — B02.9 HERPES ZOSTER WITHOUT COMPLICATION: Status: ACTIVE | Noted: 2024-04-10

## 2024-04-10 PROBLEM — I70.0 AORTIC ATHEROSCLEROSIS: Status: RESOLVED | Noted: 2023-07-18 | Resolved: 2024-04-10

## 2024-04-10 NOTE — PROGRESS NOTES
"      /62 (BP Location: Right arm, Patient Position: Sitting, BP Method: Medium (Manual))   Pulse 82   Resp 18   Ht 5' 6" (1.676 m)   Wt 85.5 kg (188 lb 7.9 oz)   SpO2 97%   BMI 30.42 kg/m²       ===========    Chief Complaint: Follow-up (ER FU For Shingles)          Follow-up  Pertinent negatives include no abdominal pain, arthralgias, chest pain, chills, congestion, coughing, diaphoresis, fatigue, fever, headaches, joint swelling, myalgias, nausea, neck pain, numbness, rash, sore throat, vomiting or weakness.       Aaron Guerra is a 69 y.o. male     here for    ER follow-up after being diagnosed with shingles recently.  Still with significant pain.    Lesions are still present and rather painful.  Taking Tylenol mostly but occasionally will take the opioid which was prescribed for him.    He had his full Shingrix vaccine course a few years ago           Patient queried and denies any further complaints      Patient Active Problem List   Diagnosis    Hypertension    Colon polyp    Allergic rhinitis    Benign prostatic hyperplasia with lower urinary tract symptoms    Hypercholesteremia    ANGELIC (obstructive sleep apnea)    [place] cardiac risk 10-20% in next 10 years    Primary osteoarthritis of left shoulder    DJD (degenerative joint disease), cervical    Elevated PSA    Beta thalassemia minor    History of solitary pulmonary nodule    Old bucket handle tear of lateral meniscus of left knee    Old bucket handle tear of medial meniscus of left knee    Gastroesophageal reflux disease without esophagitis    Class 1 obesity due to excess calories with serious comorbidity and body mass index (BMI) of 30.0 to 30.9 in adult    Corporo-venous occlusive erectile dysfunction    History of colon polyps    Primary osteoarthritis involving multiple joints    Herpes zoster without complication       SURGICAL AND MEDICAL HISTORY: updated and reviewed.  Past Surgical History:   Procedure Laterality Date    CHONDROPLASTY " OF KNEE Left 09/22/2020    Procedure: CHONDROPLASTY, KNEE;  Surgeon: Whitley Rojo MD;  Location: [facility] OR;  Service: Orthopedics;  Laterality: Left;    COLONOSCOPY N/A 12/01/2015    Procedure: COLONOSCOPY;  Surgeon: Meagan Hill MD;  Location: [facility] ENDO (4TH FLR);  Service: Endoscopy;  Laterality: N/A;    COLONOSCOPY N/A 12/22/2020    Procedure: COLONOSCOPY;  Surgeon: Trenton Harris MD;  Location: [facility] ENDO (4TH FLR);  Service: Endoscopy;  Laterality: N/A;  covid test 12/14-[facility]    CYSTOSCOPY WITH INSERTION OF MINIMALLY INVASIVE IMPLANT TO ENLARGE PROSTATIC URETHRA N/A 08/15/2018    Procedure: TRANSPROSTATIC TISSUE RETRACTION;  Surgeon: Trenton Weathers MD;  Location: [facility] OR;  Service: Urology;  Laterality: N/A;    KNEE ARTHROSCOPY W/ MENISCECTOMY Left 09/22/2020    Procedure: ARTHROSCOPY, KNEE, WITH MENISCECTOMY;  Surgeon: Whitley Rojo MD;  Location: [facility] OR;  Service: Orthopedics;  Laterality: Left;    KNEE ARTHROSCOPY W/ PLICA EXCISION Left 09/22/2020    Procedure: EXCISION, PLICA, KNEE, ARTHROSCOPIC;  Surgeon: Whitley Rojo MD;  Location: [facility] OR;  Service: Orthopedics;  Laterality: Left;    SYNOVECTOMY OF KNEE Left 09/22/2020    Procedure: SYNOVECTOMY, KNEE;  Surgeon: Whitley Rojo MD;  Location: [facility] OR;  Service: Orthopedics;  Laterality: Left;    TONSILLECTOMY       ALLERGIES updated and reviewed.  Review of patient's allergies indicates:  No Known Allergies    CURRENT OUTPATIENT MEDICATIONS updated and reviewed    Current Outpatient Medications:     albuterol (VENTOLIN HFA) 90 mcg/actuation inhaler, Inhale 2 puffs into the lungs every 6 (six) hours as needed for Wheezing. Rescue, Disp: 8 g, Rfl: 0    amLODIPine (NORVASC) 5 MG tablet, Take 1 tablet (5 mg total) by mouth once daily., Disp: 90 tablet, Rfl: 3    ascorbate calcium (VITAMIN C ORAL), Take 1 tablet by mouth once daily., Disp: , Rfl:     aspirin (ECOTRIN) 81 MG EC tablet, Take 81 mg by mouth once daily., Disp: , Rfl:     atorvastatin  (LIPITOR) 40 MG tablet, Take 1 tablet (40 mg total) by mouth every evening., Disp: 90 tablet, Rfl: 3    fluticasone propionate (FLONASE) 50 mcg/actuation nasal spray, 2 sprays (100 mcg total) by Each Nostril route once daily., Disp: 16 g, Rfl: 5    hydroCHLOROthiazide (MICROZIDE) 12.5 mg capsule, Take 1 capsule (12.5 mg total) by mouth once daily., Disp: 90 capsule, Rfl: 3    ketoconazole (NIZORAL) 2 % shampoo, Apply topically twice a week., Disp: 120 mL, Rfl: 1    tadalafiL (CIALIS) 20 MG Tab, Take 1 tablet (20 mg total) by mouth every 72 hours as needed (ED)., Disp: 15 tablet, Rfl: 11    valACYclovir (VALTREX) 1000 MG tablet, Take 1 tablet (1,000 mg total) by mouth 3 (three) times daily. for 7 days, Disp: 21 tablet, Rfl: 0    vitamin E acetate (VITAMIN E ORAL), Take 1 tablet by mouth once daily., Disp: , Rfl:     gabapentin (NEURONTIN) 100 MG capsule, Take 1 capsule (100 mg total) by mouth 3 (three) times daily., Disp: 90 capsule, Rfl: 0    HYDROcodone-acetaminophen (NORCO) 5-325 mg per tablet, Take 1 tablet by mouth every 6 (six) hours as needed for Pain., Disp: 12 tablet, Rfl: 0    RSVPreF3 antigen-AS01E, PF, (AREXVY) 120 mcg/0.5 mL SusR vaccine, Inject into the muscle. (Patient not taking: Reported on 4/9/2024), Disp: 1 each, Rfl: 0    SPIKEVAX 9816-8758,12Y UP,,PF, 50 mcg/0.5 mL injection, , Disp: , Rfl:     Review of Systems   Constitutional:  Negative for activity change, appetite change, chills, diaphoresis, fatigue, fever and unexpected weight change.   HENT:  Negative for congestion, ear discharge, ear pain, facial swelling, hearing loss, nosebleeds, postnasal drip, rhinorrhea, sinus pressure, sneezing, sore throat, tinnitus, trouble swallowing and voice change.    Eyes:  Negative for photophobia, pain, discharge, redness, itching and visual disturbance.   Respiratory:  Negative for cough, chest tightness, shortness of breath and wheezing.    Cardiovascular:  Negative for chest pain, palpitations and leg  "swelling.   Gastrointestinal:  Negative for abdominal distention, abdominal pain, anal bleeding, blood in stool, constipation, diarrhea, nausea, rectal pain and vomiting.   Endocrine: Negative for cold intolerance, heat intolerance, polydipsia, polyphagia and polyuria.   Genitourinary:  Negative for difficulty urinating, dysuria and flank pain.   Musculoskeletal:  Negative for arthralgias, back pain, joint swelling, myalgias and neck pain.   Skin:  Negative for rash.   Neurological:  Negative for dizziness, tremors, seizures, syncope, speech difficulty, weakness, light-headedness, numbness and headaches.   Psychiatric/Behavioral:  Negative for behavioral problems, confusion, decreased concentration, dysphoric mood, sleep disturbance and suicidal ideas. The patient is not nervous/anxious and is not hyperactive.        /62 (BP Location: Right arm, Patient Position: Sitting, BP Method: Medium (Manual))   Pulse 82   Resp 18   Ht 5' 6" (1.676 m)   Wt 85.5 kg (188 lb 7.9 oz)   SpO2 97%   BMI 30.42 kg/m²   Physical Exam  Musculoskeletal:        Arms:    Skin:            Comments: Significant erythema with vesicles consistent with herpes zoster.         ASSESSMENT/PLAN    Aaron was seen today for follow-up.    Diagnoses and all orders for this visit:    Herpes zoster without complication  -     HYDROcodone-acetaminophen (NORCO) 5-325 mg per tablet; Take 1 tablet by mouth every 6 (six) hours as needed for Pain.    Primary hypertension  Control.  Systolic value up likely secondary  Other orders  .  Gabapentin related to above.  See above and below-     gabapentin (NEURONTIN) 100 MG capsule; Take 1 capsule (100 mg total) by mouth 3 (three) times daily.        Complete course of valacyclovir--done    Gabapentin--side effects discussed.  Late of time for efficacy discussed.  Anti-inflammatories or Tylenol.  Reserved Norco as above for severe pain.  Follow-up in 2 weeks if not steadily improving.    Soap and water to " lesions twice a day.  Pat dry.  No special topical medications at this time.    Most recent some lab results reviewed with patient.  Any new prescription medications gone over in detail including reason for taking the medication, most common possible side effects and possible costs, etcetera.    Chronic conditions updated. Other than changes or additions as above, cont current medications and maintain follow-up with specialists if indicated.     Jarod Ayala MD  A dictation device was used to produce this document. Use of such devices sometimes results in grammatical errors or replacement of words that sound similarly.

## 2024-04-17 ENCOUNTER — PATIENT MESSAGE (OUTPATIENT)
Dept: ADMINISTRATIVE | Facility: OTHER | Age: 69
End: 2024-04-17
Payer: MEDICARE

## 2024-04-17 ENCOUNTER — TELEPHONE (OUTPATIENT)
Dept: UROLOGY | Facility: CLINIC | Age: 69
End: 2024-04-17

## 2024-04-17 ENCOUNTER — TELEPHONE (OUTPATIENT)
Dept: ADMINISTRATIVE | Facility: OTHER | Age: 69
End: 2024-04-17
Payer: MEDICARE

## 2024-04-17 ENCOUNTER — PATIENT MESSAGE (OUTPATIENT)
Dept: NEUROLOGY | Facility: CLINIC | Age: 69
End: 2024-04-17
Payer: MEDICARE

## 2024-04-17 NOTE — TELEPHONE ENCOUNTER
LVM for the patient. Offered to help reschedule next available appointment as his appt was incorrectly scheduled with Kimberlee Verdugo for neuritis. Left direct contact information and sent portal message as well.

## 2024-04-17 NOTE — TELEPHONE ENCOUNTER
----- Message from Winnie Jarrett sent at 4/17/2024 12:09 PM CDT -----  Regarding: Rescheduled Appt  Contact: pt @ [phone]  Message is for arash Hermosillo missed your call and asking for a call back to rescheduled appt

## 2024-04-17 NOTE — TELEPHONE ENCOUNTER
LM with rescheduled Neurology appointment of 7-, and contact number provided for any questions. My Chart message to be sent.

## 2024-05-01 ENCOUNTER — OFFICE VISIT (OUTPATIENT)
Dept: PRIMARY CARE CLINIC | Facility: CLINIC | Age: 69
End: 2024-05-01
Payer: MEDICARE

## 2024-05-01 VITALS
HEIGHT: 66 IN | SYSTOLIC BLOOD PRESSURE: 132 MMHG | RESPIRATION RATE: 16 BRPM | TEMPERATURE: 98 F | WEIGHT: 182.56 LBS | OXYGEN SATURATION: 96 % | DIASTOLIC BLOOD PRESSURE: 60 MMHG | HEART RATE: 90 BPM | BODY MASS INDEX: 29.34 KG/M2

## 2024-05-01 DIAGNOSIS — B02.9 HERPES ZOSTER WITHOUT COMPLICATION: ICD-10-CM

## 2024-05-01 PROCEDURE — 99214 OFFICE O/P EST MOD 30 MIN: CPT | Mod: HCNC,S$GLB,, | Performed by: NURSE PRACTITIONER

## 2024-05-01 PROCEDURE — 99999 PR PBB SHADOW E&M-EST. PATIENT-LVL IV: CPT | Mod: PBBFAC,HCNC,, | Performed by: NURSE PRACTITIONER

## 2024-05-01 PROCEDURE — 3288F FALL RISK ASSESSMENT DOCD: CPT | Mod: HCNC,CPTII,S$GLB, | Performed by: NURSE PRACTITIONER

## 2024-05-01 PROCEDURE — 1101F PT FALLS ASSESS-DOCD LE1/YR: CPT | Mod: HCNC,CPTII,S$GLB, | Performed by: NURSE PRACTITIONER

## 2024-05-01 PROCEDURE — 1160F RVW MEDS BY RX/DR IN RCRD: CPT | Mod: HCNC,CPTII,S$GLB, | Performed by: NURSE PRACTITIONER

## 2024-05-01 PROCEDURE — 1125F AMNT PAIN NOTED PAIN PRSNT: CPT | Mod: HCNC,CPTII,S$GLB, | Performed by: NURSE PRACTITIONER

## 2024-05-01 PROCEDURE — 3008F BODY MASS INDEX DOCD: CPT | Mod: HCNC,CPTII,S$GLB, | Performed by: NURSE PRACTITIONER

## 2024-05-01 PROCEDURE — 3075F SYST BP GE 130 - 139MM HG: CPT | Mod: HCNC,CPTII,S$GLB, | Performed by: NURSE PRACTITIONER

## 2024-05-01 PROCEDURE — 1159F MED LIST DOCD IN RCRD: CPT | Mod: HCNC,CPTII,S$GLB, | Performed by: NURSE PRACTITIONER

## 2024-05-01 PROCEDURE — 1157F ADVNC CARE PLAN IN RCRD: CPT | Mod: HCNC,CPTII,S$GLB, | Performed by: NURSE PRACTITIONER

## 2024-05-01 PROCEDURE — 3078F DIAST BP <80 MM HG: CPT | Mod: HCNC,CPTII,S$GLB, | Performed by: NURSE PRACTITIONER

## 2024-05-01 RX ORDER — HYDROCODONE BITARTRATE AND ACETAMINOPHEN 5; 325 MG/1; MG/1
1 TABLET ORAL EVERY 6 HOURS PRN
Qty: 90 TABLET | Refills: 0 | Status: SHIPPED | OUTPATIENT
Start: 2024-05-01

## 2024-05-01 NOTE — PROGRESS NOTES
[facility] Note    Chief Complaint      Chief Complaint   Patient presents with    Rash    shingle pain       History of Present Illness      Aaron Guerra is a 69 y.o. male who presents today for   Chief Complaint   Patient presents with    Rash    shingle pain         Patient is new to me.  He was diagnosed with shingles on 04/06/24.  Presents today with continued pain to affected area with shingles.  Blisters to left-sided trunk have crusted over and dried.  Wife is present at this visit.  Patient has been taking Neurontin 100 mg 3 times a day and finding no relief nor decreased from the pain.  I discussed increasing the dose to 200 mg daily and including hydrocodone/acetaminophen 5 mg/325 mg every 6 hours as needed for pain.  Patient verbalized understanding agrees to this plan of care.  He has an appoint with Neurology in July but we will attempt to get the appointment move forward.         Review of Systems   Skin:         + shingles rash to left side trunk and left lower back dried.   Neurological:         + pain to left side of trunk and left lower back.   All 12 systems otherwise negative.       Family History:  family history includes Alzheimer's disease in his mother; COPD in his sister; Cataracts in his mother; Diabetes in his mother and sister; Drug abuse in his brother; Heart attack in his sister; Hypertension in his brother, brother, and mother; No Known Problems in his son; Stroke in his mother.   Family history was reviewed with patient.     Medications:  Outpatient Encounter Medications as of 5/1/2024   Medication Sig Note Dispense Refill    amLODIPine (NORVASC) 5 MG tablet Take 1 tablet (5 mg total) by mouth once daily.  90 tablet 3    ascorbate calcium (VITAMIN C ORAL) Take 1 tablet by mouth once daily.       aspirin (ECOTRIN) 81 MG EC tablet Take 81 mg by mouth once daily.       atorvastatin (LIPITOR) 40 MG tablet Take 1 tablet (40 mg total) by mouth every evening.  90 tablet 3     fluticasone propionate (FLONASE) 50 mcg/actuation nasal spray 2 sprays (100 mcg total) by Each Nostril route once daily.  16 g 5    hydroCHLOROthiazide (MICROZIDE) 12.5 mg capsule Take 1 capsule (12.5 mg total) by mouth once daily.  90 capsule 3    ketoconazole (NIZORAL) 2 % shampoo Apply topically twice a week.  120 mL 1    vitamin E acetate (VITAMIN E ORAL) Take 1 tablet by mouth once daily. 9/18/2020: Hold       [DISCONTINUED] gabapentin (NEURONTIN) 100 MG capsule Take 1 capsule (100 mg total) by mouth 3 (three) times daily.  90 capsule 0    [DISCONTINUED] HYDROcodone-acetaminophen (NORCO) 5-325 mg per tablet Take 1 tablet by mouth every 6 (six) hours as needed for Pain.  12 tablet 0    albuterol (VENTOLIN HFA) 90 mcg/actuation inhaler Inhale 2 puffs into the lungs every 6 (six) hours as needed for Wheezing. Rescue  8 g 0    HYDROcodone-acetaminophen (NORCO) 5-325 mg per tablet Take 1 tablet by mouth every 6 (six) hours as needed for Pain.  90 tablet 0    tadalafiL (CIALIS) 20 MG Tab Take 1 tablet (20 mg total) by mouth every 72 hours as needed (ED).  15 tablet 11    valACYclovir (VALTREX) 1000 MG tablet Take 1 tablet (1,000 mg total) by mouth 3 (three) times daily. for 7 days  21 tablet 0    [DISCONTINUED] RSVPreF3 antigen-AS01E, PF, (AREXVY) 120 mcg/0.5 mL SusR vaccine Inject into the muscle. (Patient not taking: Reported on 4/9/2024)  1 each 0    [DISCONTINUED] SPIKEVAX [id number],12Y UP,,PF, 50 mcg/0.5 mL injection  (Patient not taking: Reported on 4/9/2024)        No facility-administered encounter medications on file as of 5/1/2024.       Allergies:  Review of patient's allergies indicates:  No Known Allergies    Health Maintenance:  Health Maintenance   Topic Date Due    PROSTATE-SPECIFIC ANTIGEN  07/05/2024    Lipid Panel  07/18/2024    High Dose Statin  04/09/2025    TETANUS VACCINE  05/21/2028    Colorectal Cancer Screening  12/22/2030    Hepatitis C Screening  Completed    Shingles Vaccine  Completed  "    Health Maintenance Topics with due status: Not Due       Topic Last Completion Date    TETANUS VACCINE 05/21/2018    Pneumococcal Vaccines (Age 65+) 01/07/2020    Colorectal Cancer Screening 12/22/2020    PROSTATE-SPECIFIC ANTIGEN 07/05/2023    Hemoglobin A1c (Diabetic Prevention Screening) 07/18/2023    Lipid Panel 07/18/2023    High Dose Statin 04/09/2024       Physical Exam      Vital Signs  Temp: 97.8 °F (36.6 °C)  Temp Source: Oral  Pulse: 90  Resp: 16  SpO2: 96 %  BP: 132/60  BP Location: Right arm  Patient Position: Sitting  Pain Score:   7  Pain Loc: Buttocks (left torso)  Height and Weight  Height: 5' 6" (167.6 cm)  Weight: 82.8 kg (182 lb 8.7 oz)  BSA (Calculated - sq m): 1.96 sq meters  BMI (Calculated): 29.5  Weight in (lb) to have BMI = 25: 154.6]    Physical Exam  Vitals reviewed.   Constitutional:       Appearance: Normal appearance. He is normal weight.   HENT:      Head: Normocephalic and atraumatic.      Nose: Nose normal.      Mouth/Throat:      Mouth: Mucous membranes are moist.      Pharynx: Oropharynx is clear.   Eyes:      Extraocular Movements: Extraocular movements intact.      Conjunctiva/sclera: Conjunctivae normal.      Pupils: Pupils are equal, round, and reactive to light.   Cardiovascular:      Rate and Rhythm: Normal rate and regular rhythm.      Pulses: Normal pulses.      Heart sounds: Normal heart sounds.   Pulmonary:      Effort: Pulmonary effort is normal.      Breath sounds: Normal breath sounds.   Musculoskeletal:         General: Normal range of motion.      Cervical back: Normal range of motion and neck supple.   Skin:     General: Skin is warm and dry.      Capillary Refill: Capillary refill takes less than 2 seconds.      Findings: Rash present.      Comments: + shingles rash to left side trunk and left lower back dried.   Neurological:      General: No focal deficit present.      Mental Status: He is alert and oriented to person, place, and time. Mental status is at " baseline.   Psychiatric:         Mood and Affect: Mood normal.         Behavior: Behavior normal.         Thought Content: Thought content normal.         Judgment: Judgment normal.            Assessment/Plan     Aaron Guerra is a 69 y.o.male with:    Herpes zoster without complication  -     HYDROcodone-acetaminophen (NORCO) 5-325 mg per tablet; Take 1 tablet by mouth every 6 (six) hours as needed for Pain.  Dispense: 90 tablet; Refill: 0    - Gabapentin 200 mg TID PRN pain.      As above, continue current medications and maintain follow up with specialists.  Return to clinic as needed.    Greater than 50% of visit was spent face to face with patient.  All questions were answered to patient's satisfaction.          Karen L Spencer, NP-C [facility]

## 2024-05-15 ENCOUNTER — OFFICE VISIT (OUTPATIENT)
Dept: PRIMARY CARE CLINIC | Facility: CLINIC | Age: 69
End: 2024-05-15
Payer: MEDICARE

## 2024-05-15 ENCOUNTER — LAB VISIT (OUTPATIENT)
Dept: LAB | Facility: HOSPITAL | Age: 69
End: 2024-05-15
Attending: FAMILY MEDICINE
Payer: MEDICARE

## 2024-05-15 VITALS
HEART RATE: 80 BPM | WEIGHT: 180.13 LBS | HEIGHT: 66 IN | SYSTOLIC BLOOD PRESSURE: 128 MMHG | BODY MASS INDEX: 28.95 KG/M2 | RESPIRATION RATE: 18 BRPM | DIASTOLIC BLOOD PRESSURE: 78 MMHG

## 2024-05-15 DIAGNOSIS — N17.9 AKI (ACUTE KIDNEY INJURY): ICD-10-CM

## 2024-05-15 DIAGNOSIS — B02.29 POST HERPETIC NEURALGIA: Primary | ICD-10-CM

## 2024-05-15 DIAGNOSIS — B02.9 HERPES ZOSTER WITHOUT COMPLICATION: ICD-10-CM

## 2024-05-15 LAB
ALBUMIN SERPL BCP-MCNC: 3.9 G/DL (ref 3.5–5.2)
ANION GAP SERPL CALC-SCNC: 11 MMOL/L (ref 8–16)
BUN SERPL-MCNC: 10 MG/DL (ref 8–23)
CALCIUM SERPL-MCNC: 9.5 MG/DL (ref 8.7–10.5)
CHLORIDE SERPL-SCNC: 103 MMOL/L (ref 95–110)
CO2 SERPL-SCNC: 23 MMOL/L (ref 23–29)
CREAT SERPL-MCNC: 1.3 MG/DL (ref 0.5–1.4)
EST. GFR  (NO RACE VARIABLE): 59.8 ML/MIN/1.73 M^2
GLUCOSE SERPL-MCNC: 86 MG/DL (ref 70–110)
PHOSPHATE SERPL-MCNC: 3.1 MG/DL (ref 2.7–4.5)
POTASSIUM SERPL-SCNC: 4.2 MMOL/L (ref 3.5–5.1)
SODIUM SERPL-SCNC: 137 MMOL/L (ref 136–145)

## 2024-05-15 PROCEDURE — 80069 RENAL FUNCTION PANEL: CPT | Mod: HCNC | Performed by: FAMILY MEDICINE

## 2024-05-15 PROCEDURE — 1101F PT FALLS ASSESS-DOCD LE1/YR: CPT | Mod: HCNC,CPTII,S$GLB, | Performed by: FAMILY MEDICINE

## 2024-05-15 PROCEDURE — 99999 PR PBB SHADOW E&M-EST. PATIENT-LVL IV: CPT | Mod: PBBFAC,HCNC,, | Performed by: FAMILY MEDICINE

## 2024-05-15 PROCEDURE — 36415 COLL VENOUS BLD VENIPUNCTURE: CPT | Mod: HCNC,PN | Performed by: FAMILY MEDICINE

## 2024-05-15 PROCEDURE — 3078F DIAST BP <80 MM HG: CPT | Mod: HCNC,CPTII,S$GLB, | Performed by: FAMILY MEDICINE

## 2024-05-15 PROCEDURE — 1157F ADVNC CARE PLAN IN RCRD: CPT | Mod: HCNC,CPTII,S$GLB, | Performed by: FAMILY MEDICINE

## 2024-05-15 PROCEDURE — 1125F AMNT PAIN NOTED PAIN PRSNT: CPT | Mod: HCNC,CPTII,S$GLB, | Performed by: FAMILY MEDICINE

## 2024-05-15 PROCEDURE — 99214 OFFICE O/P EST MOD 30 MIN: CPT | Mod: HCNC,S$GLB,, | Performed by: FAMILY MEDICINE

## 2024-05-15 PROCEDURE — 3074F SYST BP LT 130 MM HG: CPT | Mod: HCNC,CPTII,S$GLB, | Performed by: FAMILY MEDICINE

## 2024-05-15 PROCEDURE — 3008F BODY MASS INDEX DOCD: CPT | Mod: HCNC,CPTII,S$GLB, | Performed by: FAMILY MEDICINE

## 2024-05-15 PROCEDURE — 3288F FALL RISK ASSESSMENT DOCD: CPT | Mod: HCNC,CPTII,S$GLB, | Performed by: FAMILY MEDICINE

## 2024-05-15 RX ORDER — GABAPENTIN 300 MG/1
300 CAPSULE ORAL 3 TIMES DAILY
Qty: 90 CAPSULE | Refills: 11 | Status: SHIPPED | OUTPATIENT
Start: 2024-05-15 | End: 2024-06-18

## 2024-05-16 ENCOUNTER — TELEPHONE (OUTPATIENT)
Dept: PAIN MEDICINE | Facility: CLINIC | Age: 69
End: 2024-05-16

## 2024-05-16 ENCOUNTER — OFFICE VISIT (OUTPATIENT)
Dept: PAIN MEDICINE | Facility: CLINIC | Age: 69
End: 2024-05-16
Payer: MEDICARE

## 2024-05-16 VITALS
RESPIRATION RATE: 18 BRPM | SYSTOLIC BLOOD PRESSURE: 134 MMHG | WEIGHT: 178.56 LBS | HEIGHT: 66 IN | DIASTOLIC BLOOD PRESSURE: 83 MMHG | HEART RATE: 78 BPM | TEMPERATURE: 98 F | OXYGEN SATURATION: 100 % | BODY MASS INDEX: 28.7 KG/M2

## 2024-05-16 DIAGNOSIS — B02.29 POST HERPETIC NEURALGIA: Primary | ICD-10-CM

## 2024-05-16 DIAGNOSIS — B02.9 HERPES ZOSTER WITHOUT COMPLICATION: ICD-10-CM

## 2024-05-16 PROCEDURE — 1101F PT FALLS ASSESS-DOCD LE1/YR: CPT | Mod: HCNC,CPTII,S$GLB, | Performed by: ANESTHESIOLOGY

## 2024-05-16 PROCEDURE — 1159F MED LIST DOCD IN RCRD: CPT | Mod: HCNC,CPTII,S$GLB, | Performed by: ANESTHESIOLOGY

## 2024-05-16 PROCEDURE — 99999 PR PBB SHADOW E&M-EST. PATIENT-LVL IV: CPT | Mod: PBBFAC,HCNC,, | Performed by: ANESTHESIOLOGY

## 2024-05-16 PROCEDURE — 99204 OFFICE O/P NEW MOD 45 MIN: CPT | Mod: HCNC,S$GLB,, | Performed by: ANESTHESIOLOGY

## 2024-05-16 PROCEDURE — 3075F SYST BP GE 130 - 139MM HG: CPT | Mod: HCNC,CPTII,S$GLB, | Performed by: ANESTHESIOLOGY

## 2024-05-16 PROCEDURE — 3008F BODY MASS INDEX DOCD: CPT | Mod: HCNC,CPTII,S$GLB, | Performed by: ANESTHESIOLOGY

## 2024-05-16 PROCEDURE — 3288F FALL RISK ASSESSMENT DOCD: CPT | Mod: HCNC,CPTII,S$GLB, | Performed by: ANESTHESIOLOGY

## 2024-05-16 PROCEDURE — 3079F DIAST BP 80-89 MM HG: CPT | Mod: HCNC,CPTII,S$GLB, | Performed by: ANESTHESIOLOGY

## 2024-05-16 PROCEDURE — 1157F ADVNC CARE PLAN IN RCRD: CPT | Mod: HCNC,CPTII,S$GLB, | Performed by: ANESTHESIOLOGY

## 2024-05-16 PROCEDURE — 1125F AMNT PAIN NOTED PAIN PRSNT: CPT | Mod: HCNC,CPTII,S$GLB, | Performed by: ANESTHESIOLOGY

## 2024-05-16 RX ORDER — LIDOCAINE 50 MG/G
2 PATCH TOPICAL DAILY
Qty: 60 PATCH | Refills: 0 | Status: SHIPPED | OUTPATIENT
Start: 2024-05-16

## 2024-05-16 RX ORDER — METHYLPREDNISOLONE 4 MG/1
TABLET ORAL
Qty: 1 EACH | Refills: 0 | Status: SHIPPED | OUTPATIENT
Start: 2024-05-16 | End: 2024-06-06

## 2024-05-16 NOTE — TELEPHONE ENCOUNTER
----- Message from Dl Saxena sent at 5/16/2024  2:22 PM CDT -----  Regarding: Rx  Name of Who is Calling:  Patient          What is the request in detail:  Please contact the patient he has questions about his Rx LIDOcaine (LIDODERM) 5 %             Can the clinic reply by [facility]: No            What Number to Call Back if not in [facility]: [phone]

## 2024-05-16 NOTE — TELEPHONE ENCOUNTER
Staff contacted the pt to assist with his medication concerns.pt did not answer staff left a vm for pt to give us a call back to assist.

## 2024-05-16 NOTE — PROGRESS NOTES
PCP: Alyssia Park MD    REFERRING PHYSICIAN: Jarod Ayala MD    CHIEF COMPLAINT: left thoracic and left groin pain    Original HISTORY OF PRESENT ILLNESS: Aaron Guerra presents to the clinic for the evaluation of the above pain. The pain started early April 2024. Patient had breakout of rash on 4/3/2023. And had burning pain in distribution of rash. Went to ED on 4/6/24 and diagnosed with shingles at that time.      Original Pain Description:  The pain is located in the T10-T11 distrbution and is radiating to the along the dermatome but does not cross midline . The pain is described as burning, shooting, and stabbing. Exacerbating factors: Touching and including clothing. Mitigating factors compression clothing. He has used his wife's tube top as a barier from his looser fitting clothing. Symptoms interfere with daily activity and sleeping. The patient feels like symptoms have been unchanged. Patient denies night fever/night sweats, urinary incontinence, bowel incontinence, significant weight loss, significant motor weakness, and loss of sensations.    Original PAIN SCORES:  Best: Pain is 5  Worst: Pain is 10  Current: Pain is 9        5/16/2024    10:11 AM   Last 3 PDI Scores   Pain Disability Index (PDI) 27       INTERVAL HISTORY: (Newest visit at the bottom)   Interval History (Date):     6 weeks of Conservative therapy:  PT:  (Must include dates)  Chiro:  HEP:       Treatments / Medications: (Ice/Heat/NSAIDS/APAP/etc):  Ice, mild benefit  Heating pad, mild benefit  Epson salt bathes, mild benefit  Gabapentin 300mg TID  Norco 5mg q6hr prn  Medrol-Dose pack    Interventional Pain Procedures: (Previous injections)  none    Past Medical History:   Diagnosis Date    Arthritis     BPH (benign prostatic hypertrophy)     Hypertension 2012    Sleep apnea      Past Surgical History:   Procedure Laterality Date    CHONDROPLASTY OF KNEE Left 09/22/2020    Procedure: CHONDROPLASTY, KNEE;  Surgeon: Whitley Rojo MD;   Location: [facility] OR;  Service: Orthopedics;  Laterality: Left;    COLONOSCOPY N/A 12/01/2015    Procedure: COLONOSCOPY;  Surgeon: Meagan Hill MD;  [facility]  Service: Endoscopy;  Laterality: N/A;    COLONOSCOPY N/A 12/22/2020    Procedure: COLONOSCOPY;  Surgeon: Trenton Harris MD;  Location: [facility] ENDO (4TH FLR);  Service: Endoscopy;  Laterality: N/A;  covid test 12/14-[facility]    CYSTOSCOPY WITH INSERTION OF MINIMALLY INVASIVE IMPLANT TO ENLARGE PROSTATIC URETHRA N/A 08/15/2018    Procedure: TRANSPROSTATIC TISSUE RETRACTION;  Surgeon: Trenton Weathers MD;  Location: [facility] OR;  Service: Urology;  Laterality: N/A;    KNEE ARTHROSCOPY W/ MENISCECTOMY Left 09/22/2020    Procedure: ARTHROSCOPY, KNEE, WITH MENISCECTOMY;  Surgeon: Whitley Rojo MD;  Location: [facility] OR;  Service: Orthopedics;  Laterality: Left;    KNEE ARTHROSCOPY W/ PLICA EXCISION Left 09/22/2020    Procedure: EXCISION, PLICA, KNEE, ARTHROSCOPIC;  Surgeon: Whitley Rojo MD;  Location: [facility] OR;  Service: Orthopedics;  Laterality: Left;    SYNOVECTOMY OF KNEE Left 09/22/2020    Procedure: SYNOVECTOMY, KNEE;  Surgeon: Whitley Rojo MD;  Location: [facility] OR;  Service: Orthopedics;  Laterality: Left;    TONSILLECTOMY       Social History     Socioeconomic History    Marital status:     Number of children: 2   Occupational History    Occupation: sales   Tobacco Use    Smoking status: Never    Smokeless tobacco: Never   Substance and Sexual Activity    Alcohol use: No    Drug use: No    Sexual activity: Not Currently     Partners: Female   Social History Narrative    The patient does not exercise regularly ().      He is not satisfied with weight.    Rates diet as good.    He does not drink at least 1/2 gallon water daily.    He drinks 2 coffee/tea/caffeine-containing soft drinks daily.    Total sleep time at night is 4-5 hours.    He does wear seat belts.    Hobbies include none.             Social Determinants of Health      Financial Resource Strain: Low Risk  (5/19/2021)    Overall Financial Resource Strain (CARDIA)     Difficulty of Paying Living Expenses: Not hard at all   Food Insecurity: No Food Insecurity (5/19/2021)    Hunger Vital Sign     Worried About Running Out of Food in the Last Year: Never true     Ran Out of Food in the Last Year: Never true   Transportation Needs: No Transportation Needs (5/19/2021)    PRAPARE - Transportation     Lack of Transportation (Medical): No     Lack of Transportation (Non-Medical): No   Physical Activity: Unknown (5/19/2021)    Exercise Vital Sign     Days of Exercise per Week: 0 days   Stress: Stress Concern Present (5/19/2021)    [employer] - Occupational Stress Questionnaire     Feeling of Stress : Very much     Family History   Problem Relation Name Age of Onset    Cataracts Mother      Diabetes Mother      Hypertension Mother      Stroke Mother      Alzheimer's disease Mother      COPD Sister      Diabetes Sister      Heart attack Sister      Drug abuse Brother      Hypertension Brother Barney     Hypertension Brother Jan     No Known Problems Robson Walker        Review of patient's allergies indicates:  No Known Allergies    Current Outpatient Medications   Medication Sig    amLODIPine (NORVASC) 5 MG tablet Take 1 tablet (5 mg total) by mouth once daily.    ascorbate calcium (VITAMIN C ORAL) Take 1 tablet by mouth once daily.    aspirin (ECOTRIN) 81 MG EC tablet Take 81 mg by mouth once daily.    atorvastatin (LIPITOR) 40 MG tablet Take 1 tablet (40 mg total) by mouth every evening.    fluticasone propionate (FLONASE) 50 mcg/actuation nasal spray 2 sprays (100 mcg total) by Each Nostril route once daily.    gabapentin (NEURONTIN) 300 MG capsule Take 1 capsule (300 mg total) by mouth 3 (three) times daily.    hydroCHLOROthiazide (MICROZIDE) 12.5 mg capsule Take 1 capsule (12.5 mg total) by mouth once daily.    HYDROcodone-acetaminophen (NORCO) 5-325 mg  "per tablet Take 1 tablet by mouth every 6 (six) hours as needed for Pain.    ketoconazole (NIZORAL) 2 % shampoo Apply topically twice a week.    valACYclovir (VALTREX) 1000 MG tablet Take 1 tablet (1,000 mg total) by mouth 3 (three) times daily. for 7 days    vitamin E acetate (VITAMIN E ORAL) Take 1 tablet by mouth once daily.    albuterol (VENTOLIN HFA) 90 mcg/actuation inhaler Inhale 2 puffs into the lungs every 6 (six) hours as needed for Wheezing. Rescue    methylPREDNISolone (MEDROL DOSEPACK) 4 mg tablet use as directed (Patient not taking: Reported on 5/16/2024)    tadalafiL (CIALIS) 20 MG Tab Take 1 tablet (20 mg total) by mouth every 72 hours as needed (ED). (Patient not taking: Reported on 5/16/2024)     No current facility-administered medications for this visit.       ROS:  GENERAL: No fever. No chills. No fatigue. Denies weight loss. Denies weight gain.  HEENT: Denies headaches. Denies vision change. Denies eye pain. Denies double vision. Denies ear pain.   CV: Denies chest pain.   PULM: Denies of shortness of breath.  GI: Denies constipation. No diarrhea. No abdominal pain. Denies nausea. Denies vomiting. No blood in stool.  HEME: Denies bleeding problems.  : Denies urgency. No painful urination. No blood in urine.  MS: Denies joint stiffness. Denies joint swelling.  Denies back pain.  SKIN: Denies rash.   NEURO: Denies seizures. No weakness.  PSYCH:  Denies difficulty sleeping. No anxiety. Denies depression. No suicidal thoughts.       VITALS:   Vitals:    05/16/24 1013   BP: 134/83   Pulse: 78   Resp: 18   Temp: 98 °F (36.7 °C)   SpO2: 100%   Weight: 81 kg (178 lb 9.2 oz)   Height: 5' 6" (1.676 m)   PainSc:   9   PainLoc: Back         PHYSICAL EXAM:   GENERAL: Well appearing, in no acute distress, alert and oriented x3.  PSYCH:  Mood and affect appropriate.  SKIN: Skin color, texture, turgor normal. Scaring noted to left flank wrapping along T11-T12 dermatome.  HEENT:  Normocephalic, atraumatic. " Cranial nerves grossly intact.  NECK: No pain to palpation over the cervical paraspinous muscles. No pain to palpation over facets. No pain with neck flexion, extension, or lateral flexion.   PULM: No evidence of respiratory difficulty, symmetric chest rise.  GI:  Non-distended  BACK: Normal range of motion. No pain to palpation over the spinous processes. No pain to palpation over facet joints. There is no pain with palpation over the sacroiliac joints bilaterally. Allodynia and tenderness to light touch along the T11-T12 dermatomes on the left. Does not cross midline.  EXTREMITIES: No deformities, edema, or skin discoloration.   MUSCULOSKELETAL: Shoulder, hip, and knee provocative maneuvers are negative. No atrophy is noted.  NEURO: Sensation is equal and appropriate bilaterally. Bilateral upper and lower extremity strength is normal and symmetric. Bilateral upper and lower extremity coordination and muscle stretch reflexes are physiologic and symmetric. Plantar response are downgoing. Straight leg raising in the supine position is negative to radicular pain.   GAIT: normal.      LABS:      IMAGING:        ASSESSMENT: 69 y.o. year old male with pain, consistent with:    Encounter Diagnosis   Name Primary?    Herpes zoster without complication        DISCUSSION: Aaron Guerra is a gentleman who comes to us with pain after shingles. Exam shows healing rash in the left T11/12 distribution.        PLAN:  Lidocaine patches ordered today - 2/day  Continue with gabapentin 300mg TID  Continue with Medrol-Dose pack  RTC 2 weeks. Plan to increase to gabapentin at that time and consider Qutenza and Intercostal blocks      I would like to thank Jarod Ayala MD for the opportunity to assist in the care of this patient. We had a very nice visit and I look forward to continuing their care. Please let me know if I can be of further assistance.     Ross Vaz  05/16/2024

## 2024-05-20 ENCOUNTER — TELEPHONE (OUTPATIENT)
Dept: PAIN MEDICINE | Facility: CLINIC | Age: 69
End: 2024-05-20
Payer: MEDICARE

## 2024-05-20 NOTE — TELEPHONE ENCOUNTER
----- Message from Ferny Zhu sent at 5/20/2024  2:06 PM CDT -----  Type:  Patient Returning Call    Who Called:     Who Left Message for Patient:     Does the patient know what this is regarding?: missed call     Best Call Back Number:   [phone]    Additional Information:

## 2024-05-20 NOTE — PROGRESS NOTES
"      /78 (BP Location: Right arm, Patient Position: Sitting, BP Method: Medium (Manual))   Pulse 80   Resp 18   Ht 5' 6" (1.676 m)   Wt 81.7 kg (180 lb 1.9 oz)   BMI 29.07 kg/m²       ===========    Chief Complaint: Herpes Zoster          HPI    Aaron Guerra is a 69 y.o. male     here for    Here for continued pain from shingles.  Diagnosed earlier this month.  Still with residua from rash but lesions have healed well.  No fevers or chills but pain is still rather intense.    Patient queried and denies any further complaints      Patient Active Problem List   Diagnosis    Hypertension    Colon polyp    Allergic rhinitis    Benign prostatic hyperplasia with lower urinary tract symptoms    Hypercholesteremia    ANGELIC (obstructive sleep apnea)    Driscoll cardiac risk 10-20% in next 10 years    Primary osteoarthritis of left shoulder    DJD (degenerative joint disease), cervical    Elevated PSA    Beta thalassemia minor    History of solitary pulmonary nodule    Old bucket handle tear of lateral meniscus of left knee    Old bucket handle tear of medial meniscus of left knee    Gastroesophageal reflux disease without esophagitis    Class 1 obesity due to excess calories with serious comorbidity and body mass index (BMI) of 30.0 to 30.9 in adult    Corporo-venous occlusive erectile dysfunction    History of colon polyps    Primary osteoarthritis involving multiple joints    Herpes zoster without complication       SURGICAL AND MEDICAL HISTORY: updated and reviewed.  Past Surgical History:   Procedure Laterality Date    CHONDROPLASTY OF KNEE Left 09/22/2020    Procedure: CHONDROPLASTY, KNEE;  Surgeon: Whitley Rojo MD;  Location: [facility];  Service: Orthopedics;  Laterality: Left;    COLONOSCOPY N/A 12/01/2015    Procedure: COLONOSCOPY;  Surgeon: Meagan Hill MD;  Location: [address]);  Service: Endoscopy;  Laterality: N/A;    COLONOSCOPY N/A 12/22/2020    Procedure: COLONOSCOPY;  Surgeon: Trenton VELASCO" MD Kimberly;  Location: [facility] ENDO (4TH FLR);  Service: Endoscopy;  Laterality: N/A;  covid test 12/14-[facility]    CYSTOSCOPY WITH INSERTION OF MINIMALLY INVASIVE IMPLANT TO ENLARGE PROSTATIC URETHRA N/A 08/15/2018    Procedure: TRANSPROSTATIC TISSUE RETRACTION;  Surgeon: Trenton Weathers MD;  Location: [facility] OR;  Service: Urology;  Laterality: N/A;    KNEE ARTHROSCOPY W/ MENISCECTOMY Left 09/22/2020    Procedure: ARTHROSCOPY, KNEE, WITH MENISCECTOMY;  Surgeon: Whitley Rojo MD;  Location: [facility] OR;  Service: Orthopedics;  Laterality: Left;    KNEE ARTHROSCOPY W/ PLICA EXCISION Left 09/22/2020    Procedure: EXCISION, PLICA, KNEE, ARTHROSCOPIC;  Surgeon: Whitley Rojo MD;  Location: [facility] OR;  Service: Orthopedics;  Laterality: Left;    SYNOVECTOMY OF KNEE Left 09/22/2020    Procedure: SYNOVECTOMY, KNEE;  Surgeon: Whitley Rojo MD;  Location: [facility] OR;  Service: Orthopedics;  Laterality: Left;    TONSILLECTOMY       ALLERGIES updated and reviewed.  Review of patient's allergies indicates:  No Known Allergies    CURRENT OUTPATIENT MEDICATIONS updated and reviewed    Current Outpatient Medications:     albuterol (VENTOLIN HFA) 90 mcg/actuation inhaler, Inhale 2 puffs into the lungs every 6 (six) hours as needed for Wheezing. Rescue, Disp: 8 g, Rfl: 0    amLODIPine (NORVASC) 5 MG tablet, Take 1 tablet (5 mg total) by mouth once daily., Disp: 90 tablet, Rfl: 3    ascorbate calcium (VITAMIN C ORAL), Take 1 tablet by mouth once daily., Disp: , Rfl:     aspirin (ECOTRIN) 81 MG EC tablet, Take 81 mg by mouth once daily., Disp: , Rfl:     atorvastatin (LIPITOR) 40 MG tablet, Take 1 tablet (40 mg total) by mouth every evening., Disp: 90 tablet, Rfl: 3    fluticasone propionate (FLONASE) 50 mcg/actuation nasal spray, 2 sprays (100 mcg total) by Each Nostril route once daily., Disp: 16 g, Rfl: 5    hydroCHLOROthiazide (MICROZIDE) 12.5 mg capsule, Take 1 capsule (12.5 mg total) by mouth once daily., Disp: 90 capsule, Rfl: 3     HYDROcodone-acetaminophen (NORCO) 5-325 mg per tablet, Take 1 tablet by mouth every 6 (six) hours as needed for Pain., Disp: 90 tablet, Rfl: 0    ketoconazole (NIZORAL) 2 % shampoo, Apply topically twice a week., Disp: 120 mL, Rfl: 1    tadalafiL (CIALIS) 20 MG Tab, Take 1 tablet (20 mg total) by mouth every 72 hours as needed (ED). (Patient not taking: Reported on 5/16/2024), Disp: 15 tablet, Rfl: 11    valACYclovir (VALTREX) 1000 MG tablet, Take 1 tablet (1,000 mg total) by mouth 3 (three) times daily. for 7 days, Disp: 21 tablet, Rfl: 0    vitamin E acetate (VITAMIN E ORAL), Take 1 tablet by mouth once daily., Disp: , Rfl:     gabapentin (NEURONTIN) 300 MG capsule, Take 1 capsule (300 mg total) by mouth 3 (three) times daily., Disp: 90 capsule, Rfl: 11    LIDOcaine (LIDODERM) 5 %, Place 2 patches onto the skin once daily. Remove & Discard patch within 12 hours or as directed by MD, Disp: 60 patch, Rfl: 0    methylPREDNISolone (MEDROL DOSEPACK) 4 mg tablet, use as directed (Patient not taking: Reported on 5/16/2024), Disp: 1 each, Rfl: 0    Review of Systems   Constitutional:  Negative for activity change, appetite change, chills, diaphoresis, fatigue, fever and unexpected weight change.   HENT:  Negative for congestion, ear discharge, ear pain, facial swelling, hearing loss, nosebleeds, postnasal drip, rhinorrhea, sinus pressure, sneezing, sore throat, tinnitus, trouble swallowing and voice change.    Eyes:  Negative for photophobia, pain, discharge, redness, itching and visual disturbance.   Respiratory:  Negative for cough, chest tightness, shortness of breath and wheezing.    Cardiovascular:  Negative for chest pain, palpitations and leg swelling.   Gastrointestinal:  Negative for abdominal distention, abdominal pain, anal bleeding, blood in stool, constipation, diarrhea, nausea, rectal pain and vomiting.   Endocrine: Negative for cold intolerance, heat intolerance, polydipsia, polyphagia and polyuria.  "  Genitourinary:  Negative for difficulty urinating, dysuria and flank pain.   Musculoskeletal:  Negative for arthralgias, back pain, joint swelling, myalgias and neck pain.   Skin:  Negative for rash.   Neurological:  Negative for dizziness, tremors, seizures, syncope, speech difficulty, weakness, light-headedness, numbness and headaches.   Psychiatric/Behavioral:  Negative for behavioral problems, confusion, decreased concentration, dysphoric mood, sleep disturbance and suicidal ideas. The patient is not nervous/anxious and is not hyperactive.        /78 (BP Location: Right arm, Patient Position: Sitting, BP Method: Medium (Manual))   Pulse 80   Resp 18   Ht 5' 6" (1.676 m)   Wt 81.7 kg (180 lb 1.9 oz)   BMI 29.07 kg/m²   Physical Exam  Vitals and nursing note reviewed.   Constitutional:       General: He is in acute distress (Not in acute distress but visibly winces at times from his herpes zoster pain.).   Skin:            Comments: Distribution roughly as above.  No open lesions.  Discoloration of skin but no evidence of cellulitis.   Psychiatric:         Mood and Affect: Mood normal.         Behavior: Behavior normal.         ASSESSMENT/PLAN    Aaron was seen today for herpes zoster.    Diagnoses and all orders for this visit:    Post herpetic neuralgia  Would like to give him some nonsteroidal anti-inflammatories but had a little blip recently on his GFR.  Repeat today.  Stay hydrated.      Repeat value showed mildly diminished GFR.  Still will avoid NSAIDs.  Use methylprednisolone.  Try to use opioid more sparingly.  Referral to pain Clinic as below.  Herpes zoster without complication  -     Ambulatory referral/consult to Pain Clinic; Future    ADITI (acute kidney injury)  -     RENAL FUNCTION PANEL; Future    Other orders  -     gabapentin (NEURONTIN) 300 MG capsule; Take 1 capsule (300 mg total) by mouth 3 (three) times daily.  -     methylPREDNISolone (MEDROL DOSEPACK) 4 mg tablet; use as " directed (Patient not taking: Reported on 5/16/2024)      Follow-up for annual with PCP in July      Most recent some lab results reviewed with patient.  Any new prescription medications gone over in detail including reason for taking the medication, most common possible side effects and possible costs, etcetera.    Chronic conditions updated. Other than changes or additions as above, cont current medications and maintain follow-up with specialists if indicated.     Jarod Ayala MD  A dictation device was used to produce this document. Use of such devices sometimes results in grammatical errors or replacement of words that sound similarly.

## 2024-05-28 NOTE — TELEPHONE ENCOUNTER
----- Message from Trenton Weathers MD sent at 8/8/2018  2:40 PM CDT -----  Regarding: Urine culture  Mr. Guerra needs a urine culture before his urolift next week. I'll place order if he can just stop by a lab and give sample. Thanks.   Quality 130: Documentation Of Current Medications In The Medical Record: Current Medications Documented Detail Level: Detailed Quality 226: Preventive Care And Screening: Tobacco Use: Screening And Cessation Intervention: Patient screened for tobacco use and is an ex/non-smoker Lab results cam back K 3.3 Phos 2.0 Ca 7.2

## 2024-05-31 ENCOUNTER — TELEPHONE (OUTPATIENT)
Dept: NEUROLOGY | Facility: CLINIC | Age: 69
End: 2024-05-31
Payer: MEDICARE

## 2024-05-31 NOTE — TELEPHONE ENCOUNTER
Spoke with patient, assisted with rescheduling neurology appointment to a virtual with Dr Montoya on 7/08/24 @ 10:00 AM.

## 2024-05-31 NOTE — TELEPHONE ENCOUNTER
I spoke with patient, Dr Montoya will be out of office on 7/24/24, offered to change appointment to a virtual on a sooner date, asking if ok to do a virtual appointment for feeling such as pin and needles post shingles to left torso.

## 2024-06-07 ENCOUNTER — TELEPHONE (OUTPATIENT)
Dept: NEUROLOGY | Facility: CLINIC | Age: 69
End: 2024-06-07
Payer: MEDICARE

## 2024-06-07 NOTE — TELEPHONE ENCOUNTER
Left voicemail for patient, Dr. Montoya will not be in [facility] [place] at [place] in July, can return call to schedule for October or keep virtual in July.  Call back number ([phone]) supplied on voicemail.

## 2024-06-07 NOTE — TELEPHONE ENCOUNTER
----- Message from Carmen Juan MA sent at 6/7/2024 10:16 AM CDT -----  Regarding: FW: Reschedule Appt  Contact: Patient    ----- Message -----  From: Ivone Esquivel  Sent: 6/7/2024   9:21 AM CDT  To: Jan Roberts Staff  Subject: Reschedule Appt                                  Patient is scheduled for a virtual appt with physician on 07/08/2024   Patent would like to reschedule appt for an in office visit   Patient prefers an appt in July   Attempted to reschedule first available was not until October   Please Assist     Patient can be reached at  [phone]

## 2024-06-10 ENCOUNTER — TELEPHONE (OUTPATIENT)
Dept: PAIN MEDICINE | Facility: CLINIC | Age: 69
End: 2024-06-10
Payer: MEDICARE

## 2024-06-10 NOTE — TELEPHONE ENCOUNTER
----- Message from Amira Corral MA sent at 6/10/2024 10:04 AM CDT -----  Name of Who is Calling:KEITH DIAMOND [[id number]]                What is the request in detail: Pt states he is requesting a call back to see if he can get a sooner appt than scheduled due to being in severe pain in left torso. Please assist.                Can the clinic reply by [facility]: No                [facility]: [phone]

## 2024-06-12 ENCOUNTER — OFFICE VISIT (OUTPATIENT)
Dept: URGENT CARE | Facility: CLINIC | Age: 69
End: 2024-06-12
Payer: MEDICARE

## 2024-06-12 VITALS
TEMPERATURE: 98 F | HEART RATE: 82 BPM | SYSTOLIC BLOOD PRESSURE: 118 MMHG | DIASTOLIC BLOOD PRESSURE: 78 MMHG | OXYGEN SATURATION: 98 % | HEIGHT: 66 IN | BODY MASS INDEX: 29.44 KG/M2 | RESPIRATION RATE: 18 BRPM | WEIGHT: 183.19 LBS

## 2024-06-12 DIAGNOSIS — B02.29 POST HERPETIC NEURALGIA: ICD-10-CM

## 2024-06-12 DIAGNOSIS — B02.8 HERPES ZOSTER WITH OTHER COMPLICATION: Primary | ICD-10-CM

## 2024-06-12 DIAGNOSIS — L03.319 CELLULITIS OF TRUNK, UNSPECIFIED SITE OF TRUNK: ICD-10-CM

## 2024-06-12 PROCEDURE — 99214 OFFICE O/P EST MOD 30 MIN: CPT | Mod: S$GLB,,, | Performed by: PHYSICIAN ASSISTANT

## 2024-06-12 RX ORDER — LIDOCAINE HYDROCHLORIDE 20 MG/ML
JELLY TOPICAL EVERY 12 HOURS PRN
Qty: 30 ML | Refills: 0 | Status: SHIPPED | OUTPATIENT
Start: 2024-06-12

## 2024-06-12 RX ORDER — KETOROLAC TROMETHAMINE 30 MG/ML
INJECTION, SOLUTION INTRAMUSCULAR; INTRAVENOUS
COMMUNITY
Start: 2024-04-06

## 2024-06-12 RX ORDER — CEPHALEXIN 500 MG/1
500 CAPSULE ORAL EVERY 6 HOURS
Qty: 28 CAPSULE | Refills: 0 | Status: SHIPPED | OUTPATIENT
Start: 2024-06-12 | End: 2024-06-19

## 2024-06-12 NOTE — PATIENT INSTRUCTIONS
- Rest.    - Drink plenty of fluids.      - continue pain regimen by pain specialist    - you can try the lidocaine jelly during day rather than lidocaine patch if you prefer.    - you can apply vaseline to skin to help with healing wounds. Keep area clean.     - For the redness that is new, it may be from irritation or infection (cellulitis). We will begin antibiotic treatment for suspected cellulitis.  watch for signs of worsening infection including increased redness, swelling, discharge, increased pain, red streaking, fever.  Seek medical attention immediately if these occur.    - You have been given an antibiotic to treat your condition today.    - Please complete the antibiotic as directed on the bottle.   - If you are female and on oral birth control pills, use additional methods to prevent pregnancy while on antibiotics and for one cycle after.   - you can take otc probiotic to limit upset stomach    - Follow up with your PCP or specialty clinic as directed in the next 1-2 weeks if not improved or as needed.  You can call [phone] to schedule an appointment with the appropriate provider.    - Go to the ER or seek medical attention immediately if you develop new or worsening symptoms.     - You must understand that you have received an Urgent Care treatment only and that you may be released before all of your medical problems are known or treated.   - You, the patient, will arrange for follow up care as instructed.   - If your condition worsens or fails to improve we recommend that you receive another evaluation at the ER immediately or contact your PCP to discuss your concerns or return here.

## 2024-06-12 NOTE — PROGRESS NOTES
"Subjective:      Patient ID: Aaron Guerra is a 69 y.o. male.    Vitals:  height is 5' 6" (1.676 m) and weight is 83.1 kg (183 lb 3.2 oz). His oral temperature is 98.1 °F (36.7 °C). His blood pressure is 118/78 and his pulse is 82. His respiration is 18 and oxygen saturation is 98%.     Chief Complaint: Rash    Pt presents today with complaint of pain, swelling, redness, tenderness in area prior shingles rash on left flank and left side of abdomen.  Patient had shingles 04/06/2024, was treated with Valtrex and pain medication, rash slowly healed, there is still some areas of scaling and scabbing, but most has scarred.  Patient states that since onset there has been intermittent swelling to the area, however yesterday he noticed new redness to the area.  Associated warmth, swelling, tenderness, pain.  Patient has used pain medication Norco, gabapentin, and lidocaine patches.  He has followed up with pain management regarding this.  He has another follow-up appointment in 6 days.  Patient states that the swelling has been waxing and waning and was present even before use of lidocaine patch.  There have been no recent new exposures over the past few days that would seemingly caused the redness.  Pt denies drainage , fever , emesis , sinus sx and usage of any new products/ meds and foods.   In addition to lidocaine patches, he has also used calamine lotion.  Skin is still very sensitive when touching it.    Rash  The current episode started more than 1 month ago. The problem has been gradually worsening since onset. Location: lt flank. The rash is characterized by pain, redness, swelling and scaling. It is unknown if there was an exposure to a precipitant. Pertinent negatives include no anorexia, congestion, cough, diarrhea, eye pain, facial edema, fatigue, fever, joint pain, nail changes, rhinorrhea, shortness of breath, sore throat or vomiting. Treatments tried: lidocaine patches and calamine lotion. The treatment " provided no relief. There is no history of allergies, asthma, eczema or varicella.       Constitution: Negative for chills, sweating, fatigue and fever.   HENT:  Negative for ear pain, congestion and sore throat.    Neck: Negative for neck pain and neck stiffness.   Cardiovascular:  Negative for chest pain, leg swelling, palpitations and sob on exertion.   Eyes:  Negative for eye itching, eye pain and eye redness.   Respiratory:  Negative for cough, sputum production and shortness of breath.    Gastrointestinal:  Negative for abdominal pain, nausea, vomiting and diarrhea.   Genitourinary:  Negative for dysuria, frequency, urgency, flank pain and hematuria.   Musculoskeletal:  Positive for pain. Negative for joint pain.   Skin:  Positive for color change, rash and erythema.   Neurological:  Negative for dizziness, passing out, headaches, disorientation and numbness.   Psychiatric/Behavioral:  Negative for disorientation.       Objective:     Physical Exam   Constitutional: He is oriented to person, place, and time. He appears well-developed.  Non-toxic appearance. He does not appear ill. No distress.   HENT:   Head: Normocephalic and atraumatic.   Ears:   Right Ear: External ear normal.   Left Ear: External ear normal.   Eyes: Conjunctivae are normal. Right eye exhibits no discharge. Left eye exhibits no discharge. No scleral icterus.   Pulmonary/Chest: Effort normal. No stridor. No respiratory distress. He has no wheezes.   Musculoskeletal:        Back:    Neurological: He is alert and oriented to person, place, and time.   Skin: Skin is not diaphoretic. erythema   Psychiatric: His behavior is normal. Judgment and thought content normal.   Nursing note and vitals reviewed.            Assessment:     1. Herpes zoster with other complication    2. Post herpetic neuralgia    3. Cellulitis of trunk, unspecified site of trunk        Plan:       Herpes zoster with other complication  -     LIDOcaine HCL 2% (XYLOCAINE) 2 %  jelly; Apply topically every 12 (twelve) hours as needed (pain).  Dispense: 30 mL; Refill: 0    Post herpetic neuralgia    Cellulitis of trunk, unspecified site of trunk  -     cephALEXin (KEFLEX) 500 MG capsule; Take 1 capsule (500 mg total) by mouth every 6 (six) hours. for 7 days  Dispense: 28 capsule; Refill: 0      Patient with healing of shingles, still some areas scaling and scabbing.  Localized area redness that is new, discussed likely causes are contact dermatitis/irritant versus cellulitis.  Given age and high-risk, will begin treatment for suspected cellulitis.  At this time there are no new vesicles and does not appear consistent with recurrence of shingles at this time.  Patient would like alternate to lidocaine patch during the day, will prescribe lidocaine jelly for p.r.n. use of severe pain.  Otherwise discussed home wound care, patient will follow-up in 6 days with pain management which has been consistent.  Patient educated about shingles, medications, and post herpetic neuralgia.  All questions answered.  Patient expresses understanding, agrees with plan. Discussed ddx, home care, tx options, and given follow up precautions.  I have reviewed the patient's chart to view previous visits, labs, and imaging to assess PMH and look for any trends or previous treatments.

## 2024-06-18 ENCOUNTER — OFFICE VISIT (OUTPATIENT)
Dept: PAIN MEDICINE | Facility: CLINIC | Age: 69
End: 2024-06-18
Payer: MEDICARE

## 2024-06-18 VITALS
WEIGHT: 185.63 LBS | DIASTOLIC BLOOD PRESSURE: 82 MMHG | RESPIRATION RATE: 18 BRPM | SYSTOLIC BLOOD PRESSURE: 131 MMHG | BODY MASS INDEX: 29.96 KG/M2 | HEART RATE: 73 BPM

## 2024-06-18 DIAGNOSIS — B02.29 POST HERPETIC NEURALGIA: Primary | ICD-10-CM

## 2024-06-18 PROCEDURE — 3079F DIAST BP 80-89 MM HG: CPT | Mod: HCNC,CPTII,S$GLB, | Performed by: ANESTHESIOLOGY

## 2024-06-18 PROCEDURE — 99214 OFFICE O/P EST MOD 30 MIN: CPT | Mod: HCNC,S$GLB,, | Performed by: ANESTHESIOLOGY

## 2024-06-18 PROCEDURE — 3008F BODY MASS INDEX DOCD: CPT | Mod: HCNC,CPTII,S$GLB, | Performed by: ANESTHESIOLOGY

## 2024-06-18 PROCEDURE — 99999 PR PBB SHADOW E&M-EST. PATIENT-LVL III: CPT | Mod: PBBFAC,HCNC,, | Performed by: ANESTHESIOLOGY

## 2024-06-18 PROCEDURE — 3075F SYST BP GE 130 - 139MM HG: CPT | Mod: HCNC,CPTII,S$GLB, | Performed by: ANESTHESIOLOGY

## 2024-06-18 PROCEDURE — 1157F ADVNC CARE PLAN IN RCRD: CPT | Mod: HCNC,CPTII,S$GLB, | Performed by: ANESTHESIOLOGY

## 2024-06-18 PROCEDURE — 1125F AMNT PAIN NOTED PAIN PRSNT: CPT | Mod: HCNC,CPTII,S$GLB, | Performed by: ANESTHESIOLOGY

## 2024-06-18 PROCEDURE — 1159F MED LIST DOCD IN RCRD: CPT | Mod: HCNC,CPTII,S$GLB, | Performed by: ANESTHESIOLOGY

## 2024-06-18 PROCEDURE — 3288F FALL RISK ASSESSMENT DOCD: CPT | Mod: HCNC,CPTII,S$GLB, | Performed by: ANESTHESIOLOGY

## 2024-06-18 PROCEDURE — 1101F PT FALLS ASSESS-DOCD LE1/YR: CPT | Mod: HCNC,CPTII,S$GLB, | Performed by: ANESTHESIOLOGY

## 2024-06-18 RX ORDER — LIDOCAINE AND PRILOCAINE 25; 25 MG/G; MG/G
CREAM TOPICAL
Qty: 30 G | Refills: 2 | Status: SHIPPED | OUTPATIENT
Start: 2024-06-18 | End: 2024-07-18

## 2024-06-18 RX ORDER — GABAPENTIN 300 MG/1
600 CAPSULE ORAL 3 TIMES DAILY
Qty: 180 CAPSULE | Refills: 11 | Status: SHIPPED | OUTPATIENT
Start: 2024-06-18 | End: 2025-06-18

## 2024-06-18 NOTE — PROGRESS NOTES
PCP: Alyssia Park MD    REFERRING PHYSICIAN: No ref. provider found    CHIEF COMPLAINT: left thoracic and left groin pain    Original HISTORY OF PRESENT ILLNESS: Aaron Guerra presents to the clinic for the evaluation of the above pain. The pain started early April 2024. Patient had breakout of rash on 4/3/2023. And had burning pain in distribution of rash. Went to ED on 4/6/24 and diagnosed with shingles at that time.      Original Pain Description:  The pain is located in the T10-T11 distrbution and is radiating to the along the dermatome but does not cross midline . The pain is described as burning, shooting, and stabbing. Exacerbating factors: Touching and including clothing. Mitigating factors compression clothing. He has used his wife's tube top as a barier from his looser fitting clothing. Symptoms interfere with daily activity and sleeping. The patient feels like symptoms have been unchanged. Patient denies night fever/night sweats, urinary incontinence, bowel incontinence, significant weight loss, significant motor weakness, and loss of sensations.    Original PAIN SCORES:  Best: Pain is 5  Worst: Pain is 10  Current: Pain is 9        6/18/2024    10:08 AM   Last 3 PDI Scores   Pain Disability Index (PDI) 56       INTERVAL HISTORY: (Newest visit at the bottom)   Interval History (6/18/24):   Patient returns to clinic today for follow-up of post-herpetic neuralgia in the Left T11-T12 distributions. At his last visit, we started lidocaine patches which are extremely helpful for him; however, the adhesive on the patches is irritating his skin. He is also taking gabapentin 300mg TID. Overall, he thinks he is getting better, albeit slowly. The pain is still very limiting for him to the point where it is painful for him to wear shirts.     6 weeks of Conservative therapy:  N/A    Treatments / Medications: (Ice/Heat/NSAIDS/APAP/etc):  Lidocaine patches - helpful, but caused skin irritation  Ice, mild  benefit  Heating pad, mild benefit  Epson salt bathes, mild benefit  Gabapentin 300mg TID  Norco 5mg q6hr prn  Medrol-Dose pack    Interventional Pain Procedures: (Previous injections)  None    Past Medical History:   Diagnosis Date    Arthritis     BPH (benign prostatic hypertrophy)     Hypertension 2012    Sleep apnea      Past Surgical History:   Procedure Laterality Date    CHONDROPLASTY OF KNEE Left 09/22/2020    Procedure: CHONDROPLASTY, KNEE;  Surgeon: Whitley Rojo MD;  Location: [facility] OR;  Service: Orthopedics;  Laterality: Left;    COLONOSCOPY N/A 12/01/2015    Procedure: COLONOSCOPY;  Surgeon: Meagan Hill MD;  [facility]  Service: Endoscopy;  Laterality: N/A;    COLONOSCOPY N/A 12/22/2020    Procedure: COLONOSCOPY;  Surgeon: Trenton Harris MD;  [facility]  Service: Endoscopy;  Laterality: N/A;  covid test 12/14-[facility]    CYSTOSCOPY WITH INSERTION OF MINIMALLY INVASIVE IMPLANT TO ENLARGE PROSTATIC URETHRA N/A 08/15/2018    Procedure: TRANSPROSTATIC TISSUE RETRACTION;  Surgeon: Trenton Weathers MD;  Location: [facility] OR;  Service: Urology;  Laterality: N/A;    KNEE ARTHROSCOPY W/ MENISCECTOMY Left 09/22/2020    Procedure: ARTHROSCOPY, KNEE, WITH MENISCECTOMY;  Surgeon: Whitley Rojo MD;  Location: [facility] OR;  Service: Orthopedics;  Laterality: Left;    KNEE ARTHROSCOPY W/ PLICA EXCISION Left 09/22/2020    Procedure: EXCISION, PLICA, KNEE, ARTHROSCOPIC;  Surgeon: Whitley Rojo MD;  Location: [facility] OR;  Service: Orthopedics;  Laterality: Left;    SYNOVECTOMY OF KNEE Left 09/22/2020    Procedure: SYNOVECTOMY, KNEE;  Surgeon: Whitley Rojo MD;  Location: [facility] OR;  Service: Orthopedics;  Laterality: Left;    TONSILLECTOMY       Social History     Socioeconomic History    Marital status:     Number of children: 2   Occupational History    Occupation: sales   Tobacco Use    Smoking status: Never    Smokeless tobacco: Never   Substance and Sexual Activity    Alcohol  use: No    Drug use: No    Sexual activity: Not Currently     Partners: Female   Social History Narrative    The patient does not exercise regularly ().      He is not satisfied with weight.    Rates diet as good.    He does not drink at least 1/2 gallon water daily.    He drinks 2 coffee/tea/caffeine-containing soft drinks daily.    Total sleep time at night is 4-5 hours.    He does wear seat belts.    Hobbies include none.             Social Determinants of Health     Financial Resource Strain: Low Risk  (5/19/2021)    Overall Financial Resource Strain (CARDIA)     Difficulty of Paying Living Expenses: Not hard at all   Food Insecurity: No Food Insecurity (5/19/2021)    Hunger Vital Sign     Worried About Running Out of Food in the Last Year: Never true     Ran Out of Food in the Last Year: Never true   Transportation Needs: No Transportation Needs (5/19/2021)    PRAPARE - Transportation     Lack of Transportation (Medical): No     Lack of Transportation (Non-Medical): No   Physical Activity: Unknown (5/19/2021)    Exercise Vital Sign     Days of Exercise per Week: 0 days   Stress: Stress Concern Present (5/19/2021)    [employer] - Occupational Stress Questionnaire     Feeling of Stress : Very much     Family History   Problem Relation Name Age of Onset    Cataracts Mother      Diabetes Mother      Hypertension Mother      Stroke Mother      Alzheimer's disease Mother      COPD Sister      Diabetes Sister      Heart attack Sister      Drug abuse Brother      Hypertension Brother Barney     Hypertension Brother Jan     No Known Problems Son Aaron        Review of patient's allergies indicates:  No Known Allergies    Current Outpatient Medications   Medication Sig    amLODIPine (NORVASC) 5 MG tablet Take 1 tablet (5 mg total) by mouth once daily.    ascorbate calcium (VITAMIN C ORAL) Take 1 tablet by mouth once daily.    aspirin (ECOTRIN) 81 MG EC tablet Take 81 mg by mouth once  daily.    atorvastatin (LIPITOR) 40 MG tablet Take 1 tablet (40 mg total) by mouth every evening.    cephALEXin (KEFLEX) 500 MG capsule Take 1 capsule (500 mg total) by mouth every 6 (six) hours. for 7 days    fluticasone propionate (FLONASE) 50 mcg/actuation nasal spray 2 sprays (100 mcg total) by Each Nostril route once daily.    gabapentin (NEURONTIN) 300 MG capsule Take 1 capsule (300 mg total) by mouth 3 (three) times daily.    hydroCHLOROthiazide (MICROZIDE) 12.5 mg capsule Take 1 capsule (12.5 mg total) by mouth once daily.    HYDROcodone-acetaminophen (NORCO) 5-325 mg per tablet Take 1 tablet by mouth every 6 (six) hours as needed for Pain.    ketoconazole (NIZORAL) 2 % shampoo Apply topically twice a week.    ketorolac (TORADOL) 30 mg/mL (1 mL) injection     LIDOcaine (LIDODERM) 5 % Place 2 patches onto the skin once daily. Remove & Discard patch within 12 hours or as directed by MD    LIDOcaine HCL 2% (XYLOCAINE) 2 % jelly Apply topically every 12 (twelve) hours as needed (pain).    vitamin E acetate (VITAMIN E ORAL) Take 1 tablet by mouth once daily.    albuterol (VENTOLIN HFA) 90 mcg/actuation inhaler Inhale 2 puffs into the lungs every 6 (six) hours as needed for Wheezing. Rescue    tadalafiL (CIALIS) 20 MG Tab Take 1 tablet (20 mg total) by mouth every 72 hours as needed (ED). (Patient not taking: Reported on 5/16/2024)    valACYclovir (VALTREX) 1000 MG tablet Take 1 tablet (1,000 mg total) by mouth 3 (three) times daily. for 7 days     No current facility-administered medications for this visit.       ROS:  GENERAL: No fever. No chills. No fatigue. Denies weight loss. Denies weight gain.  HEENT: Denies headaches. Denies vision change. Denies eye pain. Denies double vision. Denies ear pain.   CV: Denies chest pain.   PULM: Denies of shortness of breath.  GI: Denies constipation. No diarrhea. No abdominal pain. Denies nausea. Denies vomiting. No blood in stool.  HEME: Denies bleeding problems.  :  Denies urgency. No painful urination. No blood in urine.  MS: Denies joint stiffness. Denies joint swelling.  Denies back pain.  SKIN: Denies rash.   NEURO: Denies seizures. No weakness.  PSYCH:  Denies difficulty sleeping. No anxiety. Denies depression. No suicidal thoughts.       VITALS:   Vitals:    06/18/24 1009   BP: 131/82   Pulse: 73   Resp: 18   Weight: 84.2 kg (185 lb 10 oz)   PainSc:   8   PainLoc: Abdomen         PHYSICAL EXAM:   GENERAL: Well appearing, in no acute distress, alert and oriented x3.  PSYCH:  Mood and affect appropriate.  SKIN: Skin color, texture, turgor normal. Scaring noted to left flank wrapping along T11-T12 dermatome with some vesicles noted.  HEENT:  Normocephalic, atraumatic. Cranial nerves grossly intact.  NECK: No pain to palpation over the cervical paraspinous muscles. No pain to palpation over facets. No pain with neck flexion, extension, or lateral flexion.   PULM: No evidence of respiratory difficulty, symmetric chest rise.  GI:  Non-distended  BACK: Normal range of motion. No pain to palpation over the spinous processes. No pain to palpation over facet joints. There is no pain with palpation over the sacroiliac joints bilaterally. Allodynia and tenderness to light touch along the T11-T12 dermatomes on the left. Does not cross midline.  EXTREMITIES: No deformities, edema, or skin discoloration.   MUSCULOSKELETAL: Shoulder, hip, and knee provocative maneuvers are negative. No atrophy is noted.  NEURO: Sensation is equal and appropriate bilaterally. Bilateral upper and lower extremity strength is normal and symmetric. Bilateral upper and lower extremity coordination and muscle stretch reflexes are physiologic and symmetric. Plantar response are downgoing. Straight leg raising in the supine position is negative to radicular pain.   GAIT: normal.    ASSESSMENT: 69 y.o. year old male with pain, consistent with:    Encounter Diagnosis   Name Primary?    Post herpetic neuralgia Yes      DISCUSSION: Aaron Guerra is a gentleman who comes to us with pain after shingles. Exam shows healing rash in the left T11/12 distribution. He notes improvement from Lidocaine patches, but they seem to be causing a rash of their own. Overall, they feel like he is improving.     PLAN:  Prescribed EMLA cream   Titrate up on gabapentin to 600mg TID  Recommend minimizing use of Hydrocodone  Schedule for Left T11 and T12 intercostal nerve blocks  Follow up 2-4 weeks after injection    Blayne Morse MD  [facility]

## 2024-07-03 ENCOUNTER — TELEPHONE (OUTPATIENT)
Dept: PAIN MEDICINE | Facility: CLINIC | Age: 69
End: 2024-07-03
Payer: MEDICARE

## 2024-07-03 NOTE — TELEPHONE ENCOUNTER
----- Message from Manoj Miguel sent at 7/3/2024 10:35 AM CDT -----  Contact: jose de jesus  Type:  Patient Call          Who Called:  patient         Does the patient know what this is regarding?: Requesting a call back from a missed call ;please advise           Would the patient rather a call back or a response via [facility]?call           Best Call Back Number: [phone]             Additional Information:

## 2024-07-08 ENCOUNTER — PATIENT MESSAGE (OUTPATIENT)
Dept: PAIN MEDICINE | Facility: OTHER | Age: 69
End: 2024-07-08
Payer: MEDICARE

## 2024-07-10 ENCOUNTER — LAB VISIT (OUTPATIENT)
Dept: LAB | Facility: HOSPITAL | Age: 69
End: 2024-07-10
Attending: UROLOGY
Payer: MEDICARE

## 2024-07-10 ENCOUNTER — OFFICE VISIT (OUTPATIENT)
Dept: UROLOGY | Facility: CLINIC | Age: 69
End: 2024-07-10
Attending: UROLOGY
Payer: MEDICARE

## 2024-07-10 VITALS
HEIGHT: 66 IN | BODY MASS INDEX: 29.83 KG/M2 | SYSTOLIC BLOOD PRESSURE: 121 MMHG | HEART RATE: 85 BPM | WEIGHT: 185.63 LBS | DIASTOLIC BLOOD PRESSURE: 66 MMHG | OXYGEN SATURATION: 97 %

## 2024-07-10 DIAGNOSIS — N52.01 ERECTILE DYSFUNCTION DUE TO ARTERIAL INSUFFICIENCY: ICD-10-CM

## 2024-07-10 DIAGNOSIS — N40.1 BPH WITH OBSTRUCTION/LOWER URINARY TRACT SYMPTOMS: ICD-10-CM

## 2024-07-10 DIAGNOSIS — R35.1 NOCTURIA: ICD-10-CM

## 2024-07-10 DIAGNOSIS — R97.20 ELEVATED PSA: ICD-10-CM

## 2024-07-10 DIAGNOSIS — N13.8 BPH WITH OBSTRUCTION/LOWER URINARY TRACT SYMPTOMS: ICD-10-CM

## 2024-07-10 DIAGNOSIS — R97.20 ELEVATED PSA: Primary | ICD-10-CM

## 2024-07-10 LAB
PROSTATE SPECIFIC ANTIGEN, TOTAL: 6.2 NG/ML (ref 0–4)
PSA FREE MFR SERPL: 30 %
PSA FREE SERPL-MCNC: 1.86 NG/ML (ref 0–1.5)

## 2024-07-10 PROCEDURE — 84153 ASSAY OF PSA TOTAL: CPT | Mod: HCNC | Performed by: UROLOGY

## 2024-07-10 PROCEDURE — 36415 COLL VENOUS BLD VENIPUNCTURE: CPT | Mod: HCNC,PN | Performed by: UROLOGY

## 2024-07-10 NOTE — PROGRESS NOTES
"Subjective:       Aaron Guerra is a 69 y.o. male who is an established patient with Dr Weathers, though is new to me was seen  for evaluation of BPH.      Pt of Dr Weathers. Last seen 7/2023. Here for annual follow up with PSA. PSA drawn today, not yet finalized. Known elevated PSA that has been stable. High free PSA. No prior prostate MRI or PBx.      UroLift done 8/2018 for BPH. Initially did well but more nocturia after CPAP machine recalled. Still has not gotten CPAP issue resolved. Nocturia x 3 if drinking water after 8pm. Denies other bothersome LUTS.     Also with ED issues. Tried Cialis and Viagra in past without response. Partial 75% erection occasionally in AM and at night.      PSA:  7/18 - 5.1  1/20 - 5.9  8/20 - 5.4  5/21 - 5.2  6/22 - 7.5  12/22 - 6.3  7/23 - 5.5  7/24 - pending    The following portions of the patient's history were reviewed and updated as appropriate: allergies, current medications, past family history, past medical history, past social history, past surgical history and problem list.    Review of Systems  Twelve point review of systems completed. Pertinent positive and negatives listed in HPI.      Objective:    Vitals: /66 (BP Location: Left arm, Patient Position: Sitting, BP Method: Large (Automatic))   Pulse 85   Ht 5' 6" (1.676 m)   Wt 84.2 kg (185 lb 10 oz)   SpO2 97%   BMI 29.96 kg/m²     Physical Exam   General: well developed, well nourished in no acute distress  Head: normocephalic, atraumatic  Neck: no obvious enlargement of thyroid  HEENT: EOMI, mucus membranes moist, sclera anicteric, no hearing impairment  Lungs: symmetric expansion, non-labored breathing  Neuro: alert and oriented x 3, no gross deficits  Psych: normal judgment and insight, normal mood/affect and non-anxious  Genitourinary:   deferred   SAMIA: Symmetric 40g prostate without nodularity or tenderness. Normal landmarks. seminal vesicles non palpable, normal sphincter tone without hemorrhoids or rectal " masses. Normal appearance of anus and perineum.      Lab Review   Urine analysis today in clinic shows Negative     Lab Results   Component Value Date    WBC 3.18 (L) 04/06/2024    HGB 12.1 (L) 04/06/2024    HCT 38.9 (L) 04/06/2024    MCV 71 (L) 04/06/2024     04/06/2024     Lab Results   Component Value Date    CREATININE 1.3 05/15/2024    BUN 10 05/15/2024     Lab Results   Component Value Date    PSA 7.5 (H) 06/21/2022     Lab Results   Component Value Date    PSADIAG 7.3 (H) 01/07/2020       Imaging  NA       Assessment/Plan:      1. Elevated PSA    - Stably elevated for several years   - Normal SAMIA   - PSA pending from today   - No prior PBx/MRI. Will continue to monitor.      2. BPH with obstruction/lower urinary tract symptoms    - s/p UroLift in 2018. Doing very well.      3. Erectile dysfunction due to arterial insufficiency    - As he has failed PDE5i, we discussed other alternatives of ED treatment. We discussed VISH with compression ring, MUSE intraurethral suppository, penile injection therapy as non-surgical options. We also discussed possibility of IPP implantation. He understands the pros and cons of each therapy including cost and risks.   - Offered repeat trial PDE5i. Declined. Discussed VISH and ICI. He will consider.        4. Nocturia   - Reduce PM fluids   - Defer to sleep med re: CPAP        Follow up in 12mth with PSA

## 2024-07-11 ENCOUNTER — TELEPHONE (OUTPATIENT)
Dept: UROLOGY | Facility: HOSPITAL | Age: 69
End: 2024-07-11
Payer: MEDICARE

## 2024-07-11 ENCOUNTER — TELEPHONE (OUTPATIENT)
Dept: UROLOGY | Facility: CLINIC | Age: 69
End: 2024-07-11
Payer: MEDICARE

## 2024-07-11 NOTE — TELEPHONE ENCOUNTER
Please inform pt:    PSA was 6.2. Remains stable for many years. Will recheck in 1 year as discussed.

## 2024-07-12 ENCOUNTER — TELEPHONE (OUTPATIENT)
Dept: UROLOGY | Facility: CLINIC | Age: 69
End: 2024-07-12
Payer: MEDICARE

## 2024-07-12 NOTE — TELEPHONE ENCOUNTER
----- Message from Luana Dykes sent at 7/12/2024  9:05 AM CDT -----  Regarding: Return call  .Type:  Patient Returning Call    Who Called: self     Who Left Message for Patient: Drake Calvillo MA    Does the patient know what this is regarding?:no    Would the patient rather a call back or a response via [facility]? Call     Best Call Back Number:.[phone]      Additional Information:

## 2024-08-01 ENCOUNTER — TELEPHONE (OUTPATIENT)
Dept: NEUROLOGY | Facility: CLINIC | Age: 69
End: 2024-08-01
Payer: MEDICARE

## 2024-08-01 NOTE — TELEPHONE ENCOUNTER
I was scrubbing my schedule and saw Mr. Guerra was coming in for Shingles and I called him to confirm what he was coming in for. He did not answer I left him a message to call us back.

## 2024-08-05 ENCOUNTER — LAB VISIT (OUTPATIENT)
Dept: LAB | Facility: OTHER | Age: 69
End: 2024-08-05
Attending: INTERNAL MEDICINE
Payer: MEDICARE

## 2024-08-05 ENCOUNTER — OFFICE VISIT (OUTPATIENT)
Dept: INTERNAL MEDICINE | Facility: CLINIC | Age: 69
End: 2024-08-05
Payer: MEDICARE

## 2024-08-05 ENCOUNTER — TELEPHONE (OUTPATIENT)
Dept: INTERNAL MEDICINE | Facility: CLINIC | Age: 69
End: 2024-08-05

## 2024-08-05 ENCOUNTER — TELEPHONE (OUTPATIENT)
Dept: ORTHOPEDICS | Facility: CLINIC | Age: 69
End: 2024-08-05
Payer: MEDICARE

## 2024-08-05 VITALS
WEIGHT: 189.69 LBS | DIASTOLIC BLOOD PRESSURE: 72 MMHG | HEART RATE: 77 BPM | HEIGHT: 66 IN | SYSTOLIC BLOOD PRESSURE: 98 MMHG | OXYGEN SATURATION: 97 % | BODY MASS INDEX: 30.48 KG/M2

## 2024-08-05 DIAGNOSIS — D56.3 BETA THALASSEMIA MINOR: ICD-10-CM

## 2024-08-05 DIAGNOSIS — E78.2 MIXED HYPERLIPIDEMIA: ICD-10-CM

## 2024-08-05 DIAGNOSIS — G47.33 OSA (OBSTRUCTIVE SLEEP APNEA): ICD-10-CM

## 2024-08-05 DIAGNOSIS — B02.29 POST HERPETIC NEURALGIA: ICD-10-CM

## 2024-08-05 DIAGNOSIS — K21.9 GASTROESOPHAGEAL REFLUX DISEASE WITHOUT ESOPHAGITIS: ICD-10-CM

## 2024-08-05 DIAGNOSIS — R73.03 PREDIABETES: ICD-10-CM

## 2024-08-05 DIAGNOSIS — N40.1 BENIGN PROSTATIC HYPERPLASIA WITH NOCTURIA: ICD-10-CM

## 2024-08-05 DIAGNOSIS — I70.0 AORTIC ATHEROSCLEROSIS: ICD-10-CM

## 2024-08-05 DIAGNOSIS — M15.9 PRIMARY OSTEOARTHRITIS INVOLVING MULTIPLE JOINTS: ICD-10-CM

## 2024-08-05 DIAGNOSIS — Z00.00 ENCOUNTER FOR ANNUAL HEALTH EXAMINATION: Primary | ICD-10-CM

## 2024-08-05 DIAGNOSIS — Z00.00 ROUTINE GENERAL MEDICAL EXAMINATION AT A HEALTH CARE FACILITY: ICD-10-CM

## 2024-08-05 DIAGNOSIS — E66.09 CLASS 1 OBESITY DUE TO EXCESS CALORIES WITH SERIOUS COMORBIDITY AND BODY MASS INDEX (BMI) OF 30.0 TO 30.9 IN ADULT: ICD-10-CM

## 2024-08-05 DIAGNOSIS — R97.20 ELEVATED PSA: ICD-10-CM

## 2024-08-05 DIAGNOSIS — I10 PRIMARY HYPERTENSION: ICD-10-CM

## 2024-08-05 DIAGNOSIS — Z00.00 ROUTINE GENERAL MEDICAL EXAMINATION AT A HEALTH CARE FACILITY: Primary | ICD-10-CM

## 2024-08-05 DIAGNOSIS — R35.1 BENIGN PROSTATIC HYPERPLASIA WITH NOCTURIA: ICD-10-CM

## 2024-08-05 DIAGNOSIS — Z76.0 ENCOUNTER FOR MEDICATION REFILL: ICD-10-CM

## 2024-08-05 LAB
BASOPHILS # BLD AUTO: 0.03 K/UL (ref 0–0.2)
BASOPHILS NFR BLD: 0.8 % (ref 0–1.9)
CHOLEST SERPL-MCNC: 141 MG/DL (ref 120–199)
CHOLEST/HDLC SERPL: 4 {RATIO} (ref 2–5)
DIFFERENTIAL METHOD BLD: ABNORMAL
EOSINOPHIL # BLD AUTO: 0.2 K/UL (ref 0–0.5)
EOSINOPHIL NFR BLD: 5.4 % (ref 0–8)
ERYTHROCYTE [DISTWIDTH] IN BLOOD BY AUTOMATED COUNT: 16.2 % (ref 11.5–14.5)
ESTIMATED AVG GLUCOSE: 114 MG/DL (ref 68–131)
HBA1C MFR BLD: 5.6 % (ref 4–5.6)
HCT VFR BLD AUTO: 38 % (ref 40–54)
HDLC SERPL-MCNC: 35 MG/DL (ref 40–75)
HDLC SERPL: 24.8 % (ref 20–50)
HGB BLD-MCNC: 11.5 G/DL (ref 14–18)
IMM GRANULOCYTES # BLD AUTO: 0.01 K/UL (ref 0–0.04)
IMM GRANULOCYTES NFR BLD AUTO: 0.3 % (ref 0–0.5)
LDLC SERPL CALC-MCNC: 91.6 MG/DL (ref 63–159)
LYMPHOCYTES # BLD AUTO: 1.7 K/UL (ref 1–4.8)
LYMPHOCYTES NFR BLD: 42.1 % (ref 18–48)
MCH RBC QN AUTO: 22.1 PG (ref 27–31)
MCHC RBC AUTO-ENTMCNC: 30.3 G/DL (ref 32–36)
MCV RBC AUTO: 73 FL (ref 82–98)
MONOCYTES # BLD AUTO: 0.5 K/UL (ref 0.3–1)
MONOCYTES NFR BLD: 12.2 % (ref 4–15)
NEUTROPHILS # BLD AUTO: 1.5 K/UL (ref 1.8–7.7)
NEUTROPHILS NFR BLD: 39.2 % (ref 38–73)
NONHDLC SERPL-MCNC: 106 MG/DL
NRBC BLD-RTO: 0 /100 WBC
PLATELET # BLD AUTO: 259 K/UL (ref 150–450)
PMV BLD AUTO: 9.4 FL (ref 9.2–12.9)
RBC # BLD AUTO: 5.2 M/UL (ref 4.6–6.2)
TRIGL SERPL-MCNC: 72 MG/DL (ref 30–150)
TSH SERPL DL<=0.005 MIU/L-ACNC: 1.32 UIU/ML (ref 0.4–4)
WBC # BLD AUTO: 3.92 K/UL (ref 3.9–12.7)

## 2024-08-05 PROCEDURE — 85025 COMPLETE CBC W/AUTO DIFF WBC: CPT | Mod: HCNC | Performed by: INTERNAL MEDICINE

## 2024-08-05 PROCEDURE — 80061 LIPID PANEL: CPT | Mod: HCNC | Performed by: INTERNAL MEDICINE

## 2024-08-05 PROCEDURE — 83036 HEMOGLOBIN GLYCOSYLATED A1C: CPT | Mod: HCNC | Performed by: INTERNAL MEDICINE

## 2024-08-05 PROCEDURE — 84443 ASSAY THYROID STIM HORMONE: CPT | Mod: HCNC | Performed by: INTERNAL MEDICINE

## 2024-08-05 PROCEDURE — 3288F FALL RISK ASSESSMENT DOCD: CPT | Mod: HCNC,CPTII,S$GLB, | Performed by: INTERNAL MEDICINE

## 2024-08-05 PROCEDURE — 3008F BODY MASS INDEX DOCD: CPT | Mod: HCNC,CPTII,S$GLB, | Performed by: INTERNAL MEDICINE

## 2024-08-05 PROCEDURE — 1159F MED LIST DOCD IN RCRD: CPT | Mod: HCNC,CPTII,S$GLB, | Performed by: INTERNAL MEDICINE

## 2024-08-05 PROCEDURE — 3078F DIAST BP <80 MM HG: CPT | Mod: HCNC,CPTII,S$GLB, | Performed by: INTERNAL MEDICINE

## 2024-08-05 PROCEDURE — 1125F AMNT PAIN NOTED PAIN PRSNT: CPT | Mod: HCNC,CPTII,S$GLB, | Performed by: INTERNAL MEDICINE

## 2024-08-05 PROCEDURE — 1157F ADVNC CARE PLAN IN RCRD: CPT | Mod: HCNC,CPTII,S$GLB, | Performed by: INTERNAL MEDICINE

## 2024-08-05 PROCEDURE — 1160F RVW MEDS BY RX/DR IN RCRD: CPT | Mod: HCNC,CPTII,S$GLB, | Performed by: INTERNAL MEDICINE

## 2024-08-05 PROCEDURE — 3074F SYST BP LT 130 MM HG: CPT | Mod: HCNC,CPTII,S$GLB, | Performed by: INTERNAL MEDICINE

## 2024-08-05 PROCEDURE — 99397 PER PM REEVAL EST PAT 65+ YR: CPT | Mod: HCNC,S$GLB,, | Performed by: INTERNAL MEDICINE

## 2024-08-05 PROCEDURE — 36415 COLL VENOUS BLD VENIPUNCTURE: CPT | Mod: HCNC | Performed by: INTERNAL MEDICINE

## 2024-08-05 PROCEDURE — 1101F PT FALLS ASSESS-DOCD LE1/YR: CPT | Mod: HCNC,CPTII,S$GLB, | Performed by: INTERNAL MEDICINE

## 2024-08-05 PROCEDURE — 99999 PR PBB SHADOW E&M-EST. PATIENT-LVL IV: CPT | Mod: PBBFAC,HCNC,, | Performed by: INTERNAL MEDICINE

## 2024-08-05 RX ORDER — ATORVASTATIN CALCIUM 40 MG/1
40 TABLET, FILM COATED ORAL NIGHTLY
Qty: 90 TABLET | Refills: 3 | Status: SHIPPED | OUTPATIENT
Start: 2024-08-05

## 2024-08-05 RX ORDER — AMLODIPINE BESYLATE 5 MG/1
5 TABLET ORAL DAILY
Qty: 90 TABLET | Refills: 3 | Status: SHIPPED | OUTPATIENT
Start: 2024-08-05

## 2024-08-06 ENCOUNTER — OFFICE VISIT (OUTPATIENT)
Dept: NEUROLOGY | Facility: CLINIC | Age: 69
End: 2024-08-06
Payer: MEDICARE

## 2024-08-06 VITALS
HEART RATE: 89 BPM | DIASTOLIC BLOOD PRESSURE: 86 MMHG | SYSTOLIC BLOOD PRESSURE: 132 MMHG | BODY MASS INDEX: 30.82 KG/M2 | WEIGHT: 191.81 LBS | HEIGHT: 66 IN

## 2024-08-06 DIAGNOSIS — B02.9 HERPES ZOSTER WITHOUT COMPLICATION: ICD-10-CM

## 2024-08-06 PROCEDURE — 99999 PR PBB SHADOW E&M-EST. PATIENT-LVL III: CPT | Mod: PBBFAC,HCNC,, | Performed by: STUDENT IN AN ORGANIZED HEALTH CARE EDUCATION/TRAINING PROGRAM

## 2024-08-09 ENCOUNTER — TELEPHONE (OUTPATIENT)
Dept: PAIN MEDICINE | Facility: CLINIC | Age: 69
End: 2024-08-09
Payer: MEDICARE

## 2024-08-16 ENCOUNTER — OFFICE VISIT (OUTPATIENT)
Dept: PAIN MEDICINE | Facility: CLINIC | Age: 69
End: 2024-08-16
Payer: MEDICARE

## 2024-08-16 DIAGNOSIS — B02.29 POST HERPETIC NEURALGIA: Primary | ICD-10-CM

## 2024-08-16 NOTE — PROGRESS NOTES
PCP: Alyssia Park MD    REFERRING PHYSICIAN: No ref. provider found    CHIEF COMPLAINT: left thoracic and left groin pain    Original HISTORY OF PRESENT ILLNESS: Aaron Guerra presents to the clinic for the evaluation of the above pain. The pain started early April 2024. Patient had breakout of rash on 4/3/2023. And had burning pain in distribution of rash. Went to ED on 4/6/24 and diagnosed with shingles at that time.      Original Pain Description:  The pain is located in the T10-T11 distrbution and is radiating to the along the dermatome but does not cross midline . The pain is described as burning, shooting, and stabbing. Exacerbating factors: Touching and including clothing. Mitigating factors compression clothing. He has used his wife's tube top as a barier from his looser fitting clothing. Symptoms interfere with daily activity and sleeping. The patient feels like symptoms have been unchanged. Patient denies night fever/night sweats, urinary incontinence, bowel incontinence, significant weight loss, significant motor weakness, and loss of sensations.    Original PAIN SCORES:  Best: Pain is 5  Worst: Pain is 10  Current: Pain is 9        6/18/2024    10:08 AM   Last 3 PDI Scores   Pain Disability Index (PDI) 56       INTERVAL HISTORY: (Newest visit at the bottom)   Interval History (6/18/24):   Patient returns to clinic today for follow-up of post-herpetic neuralgia in the Left T11-T12 distributions. At his last visit, we started lidocaine patches which are extremely helpful for him; however, the adhesive on the patches is irritating his skin. He is also taking gabapentin 300mg TID. Overall, he thinks he is getting better, albeit slowly. The pain is still very limiting for him to the point where it is painful for him to wear shirts.     Interval History 66613: Aaron Guerra returns for follow up. At our last visit it appeared he had post-herpetic neuralgia which was improving. We switched him to EMLA and increased  his Gabapentin. He returns today noting that he was doing so well with his pain that he stopped the Gabapentin. After a couple days the pain came back and he realized he still needed the medication. He is back on the gabapentin now 300,300,600 and notes pain of usually 3-4/10. He feels comfortable with this level of pain control.     6 weeks of Conservative therapy:  N/A    Treatments / Medications: (Ice/Heat/NSAIDS/APAP/etc):  Lidocaine patches - helpful, but caused skin irritation  Ice, mild benefit  Heating pad, mild benefit  Epson salt bathes, mild benefit  Gabapentin 300mg TID  Norco 5mg q6hr prn  Medrol-Dose pack    Interventional Pain Procedures: (Previous injections)  None    Past Medical History:   Diagnosis Date    Arthritis     BPH (benign prostatic hypertrophy)     Hypertension 2012    Sleep apnea      Past Surgical History:   Procedure Laterality Date    CHONDROPLASTY OF KNEE Left 09/22/2020    Procedure: CHONDROPLASTY, KNEE;  Surgeon: Whitley Rojo MD;  Location: [facility] OR;  Service: Orthopedics;  Laterality: Left;    COLONOSCOPY N/A 12/01/2015    Procedure: COLONOSCOPY;  Surgeon: Meagan Hill MD;  Location: [address]);  Service: Endoscopy;  Laterality: N/A;    COLONOSCOPY N/A 12/22/2020    Procedure: COLONOSCOPY;  Surgeon: Trenton Harris MD;  Location: [facility] ([address] [place] [address]);  Service: Endoscopy;  Laterality: N/A;  covid test 12/14-[facility]    CYSTOSCOPY WITH INSERTION OF MINIMALLY INVASIVE IMPLANT TO ENLARGE PROSTATIC URETHRA N/A 08/15/2018    Procedure: TRANSPROSTATIC TISSUE RETRACTION;  Surgeon: Trenton Weathers MD;  Location: [facility] OR;  Service: Urology;  Laterality: N/A;    KNEE ARTHROSCOPY W/ MENISCECTOMY Left 09/22/2020    Procedure: ARTHROSCOPY, KNEE, WITH MENISCECTOMY;  Surgeon: Whitley Rojo MD;  Location: [facility] OR;  Service: Orthopedics;  Laterality: Left;    KNEE ARTHROSCOPY W/ PLICA EXCISION Left 09/22/2020    Procedure: EXCISION, PLICA, KNEE, ARTHROSCOPIC;  Surgeon: Whitley  MD Alia;  Location: [facility] OR;  Service: Orthopedics;  Laterality: Left;    SYNOVECTOMY OF KNEE Left 09/22/2020    Procedure: SYNOVECTOMY, KNEE;  Surgeon: Whitley Rojo MD;  Location: [facility];  Service: Orthopedics;  Laterality: Left;    TONSILLECTOMY       Social History     Socioeconomic History    Marital status:     Number of children: 2   Occupational History    Occupation: sales   Tobacco Use    Smoking status: Never    Smokeless tobacco: Never   Substance and Sexual Activity    Alcohol use: No    Drug use: No    Sexual activity: Not Currently     Partners: Female   Social History Narrative    The patient does not exercise regularly ().      He is not satisfied with weight.    Rates diet as good.    He does not drink at least 1/2 gallon water daily.    He drinks 2 coffee/tea/caffeine-containing soft drinks daily.    Total sleep time at night is 4-5 hours.    He does wear seat belts.    Hobbies include none.             Social Determinants of Health     Financial Resource Strain: Low Risk  (5/19/2021)    Overall Financial Resource Strain (CARDIA)     Difficulty of Paying Living Expenses: Not hard at all   Food Insecurity: No Food Insecurity (5/19/2021)    Hunger Vital Sign     Worried About Running Out of Food in the Last Year: Never true     Ran Out of Food in the Last Year: Never true   Transportation Needs: No Transportation Needs (5/19/2021)    PRAPARE - Transportation     Lack of Transportation (Medical): No     Lack of Transportation (Non-Medical): No   Physical Activity: Unknown (5/19/2021)    Exercise Vital Sign     Days of Exercise per Week: 0 days   Stress: Stress Concern Present (5/19/2021)    [employer] - Occupational Stress Questionnaire     Feeling of Stress : Very much     Family History   Problem Relation Name Age of Onset    Cataracts Mother      Diabetes Mother      Hypertension Mother      Stroke Mother      Alzheimer's disease Mother      COPD Sister       Diabetes Sister      Heart attack Sister      Drug abuse Brother      Hypertension Brother Barney     Hypertension Brother Jan     No Known Problems Son Aaron        Review of patient's allergies indicates:   Allergen Reactions    Adhesive Rash       Current Outpatient Medications   Medication Sig    albuterol (VENTOLIN HFA) 90 mcg/actuation inhaler Inhale 2 puffs into the lungs every 6 (six) hours as needed for Wheezing. Rescue    amLODIPine (NORVASC) 5 MG tablet Take 1 tablet (5 mg total) by mouth once daily.    ascorbate calcium (VITAMIN C ORAL) Take 1 tablet by mouth once daily.    aspirin (ECOTRIN) 81 MG EC tablet Take 81 mg by mouth once daily.    atorvastatin (LIPITOR) 40 MG tablet Take 1 tablet (40 mg total) by mouth every evening.    fluticasone propionate (FLONASE) 50 mcg/actuation nasal spray 2 sprays (100 mcg total) by Each Nostril route once daily.    gabapentin (NEURONTIN) 300 MG capsule Take 2 capsules (600 mg total) by mouth 3 (three) times daily.    ketoconazole (NIZORAL) 2 % shampoo Apply topically twice a week.    ketorolac (TORADOL) 30 mg/mL (1 mL) injection     LIDOcaine HCL 2% (XYLOCAINE) 2 % jelly Apply topically every 12 (twelve) hours as needed (pain).    tadalafiL (CIALIS) 20 MG Tab Take 1 tablet (20 mg total) by mouth every 72 hours as needed (ED). (Patient not taking: Reported on 5/16/2024)    valACYclovir (VALTREX) 1000 MG tablet Take 1 tablet (1,000 mg total) by mouth 3 (three) times daily. for 7 days    vitamin E acetate (VITAMIN E ORAL) Take 1 tablet by mouth once daily.     No current facility-administered medications for this visit.       ROS:  GENERAL: No fever. No chills. No fatigue. Denies weight loss. Denies weight gain.  HEENT: Denies headaches. Denies vision change. Denies eye pain. Denies double vision. Denies ear pain.   CV: Denies chest pain.   PULM: Denies of shortness of breath.  GI: Denies constipation. No diarrhea. No abdominal pain. Denies nausea. Denies vomiting.  No blood in stool.  HEME: Denies bleeding problems.  : Denies urgency. No painful urination. No blood in urine.  MS: Denies joint stiffness. Denies joint swelling.  Denies back pain.  SKIN: Denies rash.   NEURO: Denies seizures. No weakness.  PSYCH:  Denies difficulty sleeping. No anxiety. Denies depression. No suicidal thoughts.       VITALS:   There were no vitals filed for this visit.        PHYSICAL EXAM: (PRIOR)  GENERAL: Well appearing, in no acute distress, alert and oriented x3.  PSYCH:  Mood and affect appropriate.  SKIN: Skin color, texture, turgor normal. Scaring noted to left flank wrapping along T11-T12 dermatome with some vesicles noted.  HEENT:  Normocephalic, atraumatic. Cranial nerves grossly intact.  NECK: No pain to palpation over the cervical paraspinous muscles. No pain to palpation over facets. No pain with neck flexion, extension, or lateral flexion.   PULM: No evidence of respiratory difficulty, symmetric chest rise.  GI:  Non-distended  BACK: Normal range of motion. No pain to palpation over the spinous processes. No pain to palpation over facet joints. There is no pain with palpation over the sacroiliac joints bilaterally. Allodynia and tenderness to light touch along the T11-T12 dermatomes on the left. Does not cross midline.  EXTREMITIES: No deformities, edema, or skin discoloration.   MUSCULOSKELETAL: Shoulder, hip, and knee provocative maneuvers are negative. No atrophy is noted.  NEURO: Sensation is equal and appropriate bilaterally. Bilateral upper and lower extremity strength is normal and symmetric. Bilateral upper and lower extremity coordination and muscle stretch reflexes are physiologic and symmetric. Plantar response are downgoing. Straight leg raising in the supine position is negative to radicular pain.   GAIT: normal.    ASSESSMENT: 69 y.o. year old male with pain, consistent with:    Encounter Diagnosis   Name Primary?    Post herpetic neuralgia Yes       DISCUSSION:  Aaron Guerra is a gentleman who comes to us with pain after shingles. Exam shows healing rash in the left T11/12 distribution. He notes improvement from Lidocaine patches, but they seem to be causing a rash of their own. We started him on EMLA and Gabapentin and his pain was controlled.     PLAN:  Continue EMLA cream as needed  Continue gabapentin to 300,300,600  Recommend minimizing use of Hydrocodone  Follow up in 3 months   Will taper when tolerated    Sona Bell  08/16/2024

## 2024-10-29 NOTE — PATIENT INSTRUCTIONS
Labs are fasting. Please do not eat or drink anything other than water for 6-8 hrs prior to your lab work.    12 months for well visit or sooner if needed.    home

## 2024-11-05 ENCOUNTER — OFFICE VISIT (OUTPATIENT)
Dept: SLEEP MEDICINE | Facility: CLINIC | Age: 69
End: 2024-11-05
Payer: MEDICARE

## 2024-11-05 VITALS
HEIGHT: 66 IN | DIASTOLIC BLOOD PRESSURE: 90 MMHG | SYSTOLIC BLOOD PRESSURE: 141 MMHG | WEIGHT: 194 LBS | HEART RATE: 76 BPM | BODY MASS INDEX: 31.18 KG/M2

## 2024-11-05 DIAGNOSIS — G47.33 OSA (OBSTRUCTIVE SLEEP APNEA): ICD-10-CM

## 2024-11-05 PROCEDURE — 3008F BODY MASS INDEX DOCD: CPT | Mod: HCNC,CPTII,S$GLB, | Performed by: NURSE PRACTITIONER

## 2024-11-05 PROCEDURE — 1101F PT FALLS ASSESS-DOCD LE1/YR: CPT | Mod: HCNC,CPTII,S$GLB, | Performed by: NURSE PRACTITIONER

## 2024-11-05 PROCEDURE — 99999 PR PBB SHADOW E&M-EST. PATIENT-LVL III: CPT | Mod: PBBFAC,HCNC,, | Performed by: NURSE PRACTITIONER

## 2024-11-05 PROCEDURE — 1159F MED LIST DOCD IN RCRD: CPT | Mod: HCNC,CPTII,S$GLB, | Performed by: NURSE PRACTITIONER

## 2024-11-05 PROCEDURE — 1125F AMNT PAIN NOTED PAIN PRSNT: CPT | Mod: HCNC,CPTII,S$GLB, | Performed by: NURSE PRACTITIONER

## 2024-11-05 PROCEDURE — 1157F ADVNC CARE PLAN IN RCRD: CPT | Mod: HCNC,CPTII,S$GLB, | Performed by: NURSE PRACTITIONER

## 2024-11-05 PROCEDURE — 3044F HG A1C LEVEL LT 7.0%: CPT | Mod: HCNC,CPTII,S$GLB, | Performed by: NURSE PRACTITIONER

## 2024-11-05 PROCEDURE — 3080F DIAST BP >= 90 MM HG: CPT | Mod: HCNC,CPTII,S$GLB, | Performed by: NURSE PRACTITIONER

## 2024-11-05 PROCEDURE — 3288F FALL RISK ASSESSMENT DOCD: CPT | Mod: HCNC,CPTII,S$GLB, | Performed by: NURSE PRACTITIONER

## 2024-11-05 PROCEDURE — 99214 OFFICE O/P EST MOD 30 MIN: CPT | Mod: HCNC,S$GLB,, | Performed by: NURSE PRACTITIONER

## 2024-11-05 PROCEDURE — 3077F SYST BP >= 140 MM HG: CPT | Mod: HCNC,CPTII,S$GLB, | Performed by: NURSE PRACTITIONER

## 2024-11-05 NOTE — PROGRESS NOTES
"Cc: ANGELIC    He was adherent with PAP but not since recall and he also found his mask uncomfortable. Used F20 Sleep was more consolidated and snoring resolved with pap. Current disrupted sleep 3-4x  Previous Remote review 60d 2019 avg 5.6h/negative AHI 4.8, 90% tile 13cm          HST AHI 39, Mean Spo2: 94.8. . < 90 Spo2 2.4%. Time snorin%    BP (!) 141/90   Pulse 76   Ht 5' 6" (1.676 m)   Wt 88 kg (194 lb 0.1 oz)   BMI 31.31 kg/m²                      ASSESSMENT:  ANGELIC , severe. Adherent AHI<5. Benefits from therapy. Stopped due to recall and mask interface, ready to resume consistent use, awaiting replacement machine  Medical co-morbidities: systemic HTN     PLAN:  Completed recall process, when gets CPAP 5- 15cm resume and get any needed supplies. rTC 1-2 mos after use  [facility] [employer] supplies  "

## 2024-12-06 ENCOUNTER — OFFICE VISIT (OUTPATIENT)
Dept: OPTOMETRY | Facility: CLINIC | Age: 69
End: 2024-12-06
Payer: MEDICARE

## 2024-12-06 DIAGNOSIS — H52.4 PRESBYOPIA OF BOTH EYES: ICD-10-CM

## 2024-12-06 DIAGNOSIS — H25.13 NUCLEAR SCLEROSIS OF BOTH EYES: ICD-10-CM

## 2024-12-06 DIAGNOSIS — H43.393 VITREOUS FLOATER, BILATERAL: Primary | ICD-10-CM

## 2024-12-06 PROCEDURE — 99999 PR PBB SHADOW E&M-EST. PATIENT-LVL II: CPT | Mod: PBBFAC,HCNC,, | Performed by: OPTOMETRIST

## 2024-12-06 NOTE — PROGRESS NOTES
HPI    DARA: 4/21/23 Dr. Schwarz   Last DFE: 4/21/23  Chief complaint (CC): 70 yo M here for annual eye exam. C/O of floaters in   OU, happens irregularly but is noticeable. (-) flashes   Glasses? -  Contacts? -  H/o eye surgery, injections or laser: -  H/o eye injury: -  Known eye conditions?    Nuclear sclerosis of both eyes    Family h/o eye conditions? Mother , cataracts.   Eye gtts? -      (-) Flashes (+)  Floaters (-) Mucous   (-)  Tearing (-) Itching (-) Burning   (-) Headaches (-) Eye Pain/discomfort (-) Irritation   (-)  Redness (-) Double vision (-) Blurry vision    CL Exam: No    Diabetic? -  A1c? Lab Results       Component                Value               Date                       HGBA1C                   5.6                 08/05/2024              Last edited by Krish Jim, OD on 12/6/2024  2:04 PM.            Assessment /Plan     For exam results, see Encounter Report.        Vitreous floater, bilateral   - No evidence of holes, tears or detachment of retina. Negative Shafers sign in vitreous. Patient educated on signs and symptoms of retinal detachment and advised to return to clinic immediately if symptoms arise.   - RTC 1 year for annual exam.     Nuclear sclerosis of both eyes   - Not visually significant. Monitor     Presbyopia of both eyes   - Continue with OTC Readers.

## 2025-02-06 ENCOUNTER — TELEPHONE (OUTPATIENT)
Dept: INTERNAL MEDICINE | Facility: CLINIC | Age: 70
End: 2025-02-06

## 2025-02-11 ENCOUNTER — OFFICE VISIT (OUTPATIENT)
Dept: INTERNAL MEDICINE | Facility: CLINIC | Age: 70
End: 2025-02-11
Payer: MEDICARE

## 2025-02-11 VITALS
HEART RATE: 97 BPM | OXYGEN SATURATION: 95 % | WEIGHT: 189.69 LBS | SYSTOLIC BLOOD PRESSURE: 126 MMHG | DIASTOLIC BLOOD PRESSURE: 86 MMHG | BODY MASS INDEX: 30.48 KG/M2 | HEIGHT: 66 IN

## 2025-02-11 DIAGNOSIS — M25.569 KNEE PAIN, UNSPECIFIED CHRONICITY, UNSPECIFIED LATERALITY: ICD-10-CM

## 2025-02-11 DIAGNOSIS — R97.20 ELEVATED PSA: Primary | ICD-10-CM

## 2025-02-11 DIAGNOSIS — R35.0 URINARY FREQUENCY: ICD-10-CM

## 2025-02-11 DIAGNOSIS — B02.29 POST HERPETIC NEURALGIA: ICD-10-CM

## 2025-02-11 PROCEDURE — 1157F ADVNC CARE PLAN IN RCRD: CPT | Mod: HCNC,CPTII,S$GLB, | Performed by: INTERNAL MEDICINE

## 2025-02-11 PROCEDURE — G2211 COMPLEX E/M VISIT ADD ON: HCPCS | Mod: HCNC,S$GLB,, | Performed by: INTERNAL MEDICINE

## 2025-02-11 PROCEDURE — 99999 PR PBB SHADOW E&M-EST. PATIENT-LVL IV: CPT | Mod: PBBFAC,HCNC,, | Performed by: INTERNAL MEDICINE

## 2025-02-11 PROCEDURE — 3079F DIAST BP 80-89 MM HG: CPT | Mod: HCNC,CPTII,S$GLB, | Performed by: INTERNAL MEDICINE

## 2025-02-11 PROCEDURE — 99214 OFFICE O/P EST MOD 30 MIN: CPT | Mod: HCNC,S$GLB,, | Performed by: INTERNAL MEDICINE

## 2025-02-11 PROCEDURE — 3288F FALL RISK ASSESSMENT DOCD: CPT | Mod: HCNC,CPTII,S$GLB, | Performed by: INTERNAL MEDICINE

## 2025-02-11 PROCEDURE — 1126F AMNT PAIN NOTED NONE PRSNT: CPT | Mod: HCNC,CPTII,S$GLB, | Performed by: INTERNAL MEDICINE

## 2025-02-11 PROCEDURE — 3074F SYST BP LT 130 MM HG: CPT | Mod: HCNC,CPTII,S$GLB, | Performed by: INTERNAL MEDICINE

## 2025-02-11 PROCEDURE — 1159F MED LIST DOCD IN RCRD: CPT | Mod: HCNC,CPTII,S$GLB, | Performed by: INTERNAL MEDICINE

## 2025-02-11 PROCEDURE — 1160F RVW MEDS BY RX/DR IN RCRD: CPT | Mod: HCNC,CPTII,S$GLB, | Performed by: INTERNAL MEDICINE

## 2025-02-11 PROCEDURE — 1101F PT FALLS ASSESS-DOCD LE1/YR: CPT | Mod: HCNC,CPTII,S$GLB, | Performed by: INTERNAL MEDICINE

## 2025-02-11 PROCEDURE — 3008F BODY MASS INDEX DOCD: CPT | Mod: HCNC,CPTII,S$GLB, | Performed by: INTERNAL MEDICINE

## 2025-02-11 RX ORDER — OXYBUTYNIN CHLORIDE 10 MG/1
10 TABLET, EXTENDED RELEASE ORAL DAILY
Qty: 90 TABLET | Refills: 1 | Status: SHIPPED | OUTPATIENT
Start: 2025-02-11 | End: 2026-02-11

## 2025-02-11 RX ORDER — VALACYCLOVIR HYDROCHLORIDE 1 G/1
1000 TABLET, FILM COATED ORAL EVERY 12 HOURS
Qty: 90 TABLET | Refills: 0 | Status: SHIPPED | OUTPATIENT
Start: 2025-02-11

## 2025-02-11 NOTE — PROGRESS NOTES
Subjective:      Patient ID: Aaron Guerra is a 69 y.o. male.    Chief Complaint: Annual Exam      Aaron Guerra is a 69 y.o. male with chronic conditions significant for HTN, BPH, HLD, ANGELIC on CPAP, elevated PSA, beta thalassemia minor, history of solid pulmonary nodule (now resolved based on CT scan from 2020), post herpetic neuralgia   Presenting today for follow up / annual. Date of last annual is 8/5/2024    HTN: BP well controlled. Office /86 Continues on amlodipine. D/c HCTZ at this visit.  No CP/SOB/lightheadedness/dizziness/palpitations.      ANGELIC: On CPAP at this time.      Seasonal allergies: Renew flonase.      Beta thalassemia minor: Note mildly low CBC with low MCV. Continue to monitor.      Discussed increase in water intake due to mildly elevated creatinine that was seen. Discussed importance of controlling BP, avoiding nephrotoxic medications.     Colonoscopy - 12/22/2020, due 2030     Post herpetic neuralgia:  Was diagnosed with shingle early April 20, 2024.  He had a burning pain in distribution of the rash with vesicles.  The vesical have resolved with valacyclovir at that time.     BPH with elevated PSA: Follow with urology     The patient consents verbally to being recorded for "[employer] service today.     History of Present Illness    CHIEF COMPLAINT:  Mr. Guerra presents today for increased urinary urgency    UROLOGIC HISTORY:  He reports increased urinary frequency, urinating every 2 hours overnight. His last urologist visit was in July when labs showed elevated prostate levels. He was initially scheduled for follow up in one year but is experiencing worsening symptoms.    SLEEP APNEA:  He has a history of stopping breathing during sleep and uses CPAP for management. While the CPAP helps with his sleep apnea, he recently discontinued use due to concerns about a soft spot on the top of his head, which he attributes to CPAP use. He describes the machine as uncomfortable due to noise and  face coverage. Despite previous CPAP use, he continued to experience nocturia.    SHINGLES:  He has ongoing shingles since April with symptoms currently flaring up. He takes gabapentin at night for symptom management, noting daytime use causes excessive drowsiness.    MEDICAL HISTORY:  He has chronic mild anemia being monitored in the context of beta thalassemia and ongoing kidney issues.    Visit today is associated with current or anticipated ongoing medical care related to this patient's single serious condition/complex condition of HTN, BPH, HLD, ANGELIC on CPAP, elevated PSA, beta thalassemia minor, history of solid pulmonary nodule (now resolved based on CT scan from 2020), post herpetic neuralgia. The patient will return to see me as these issues will be followed longitudinally.      ROS:  General: +fever, -chills, -fatigue, -weight gain, -weight loss  Eyes: -vision changes, -redness, -discharge  ENT: -ear pain, -nasal congestion, -sore throat  Cardiovascular: -chest pain, -palpitations, -lower extremity edema  Respiratory: -cough, -shortness of breath  Gastrointestinal: -abdominal pain, -nausea, -vomiting, -diarrhea, -constipation, -blood in stool  Genitourinary: -dysuria, -hematuria, +frequency, +urgency, +nocturia  Musculoskeletal: -joint pain, -muscle pain  Skin: -rash, -lesion  Neurological: -headache, -dizziness, -numbness, -tingling  Psychiatric: -anxiety, -depression, -sleep difficulty            Current Outpatient Medications:     albuterol (VENTOLIN HFA) 90 mcg/actuation inhaler, Inhale 2 puffs into the lungs every 6 (six) hours as needed for Wheezing. Rescue, Disp: 8 g, Rfl: 0    amLODIPine (NORVASC) 5 MG tablet, Take 1 tablet (5 mg total) by mouth once daily., Disp: 90 tablet, Rfl: 3    ascorbate calcium (VITAMIN C ORAL), Take 1 tablet by mouth once daily., Disp: , Rfl:     aspirin (ECOTRIN) 81 MG EC tablet, Take 81 mg by mouth once daily., Disp: , Rfl:     atorvastatin (LIPITOR) 40 MG tablet, Take 1  tablet (40 mg total) by mouth every evening., Disp: 90 tablet, Rfl: 3    fluticasone propionate (FLONASE) 50 mcg/actuation nasal spray, 2 sprays (100 mcg total) by Each Nostril route once daily., Disp: 16 g, Rfl: 5    gabapentin (NEURONTIN) 300 MG capsule, Take 2 capsules (600 mg total) by mouth 3 (three) times daily., Disp: 180 capsule, Rfl: 11    ketoconazole (NIZORAL) 2 % shampoo, Apply topically twice a week., Disp: 120 mL, Rfl: 1    vitamin E acetate (VITAMIN E ORAL), Take 1 tablet by mouth once daily., Disp: , Rfl:     ketorolac (TORADOL) 30 mg/mL (1 mL) injection, , Disp: , Rfl:     oxybutynin (DITROPAN-XL) 10 MG 24 hr tablet, Take 1 tablet (10 mg total) by mouth once daily., Disp: 90 tablet, Rfl: 1    tadalafiL (CIALIS) 20 MG Tab, Take 1 tablet (20 mg total) by mouth every 72 hours as needed (ED). (Patient not taking: Reported on 5/16/2024), Disp: 15 tablet, Rfl: 11    valACYclovir (VALTREX) 1000 MG tablet, Take 1 tablet (1,000 mg total) by mouth every 12 (twelve) hours., Disp: 90 tablet, Rfl: 0    Lab Results   Component Value Date    HGBA1C 5.6 08/05/2024    HGBA1C 5.8 (H) 07/18/2023     Lab Results   Component Value Date    MICALBCREAT 6.8 05/19/2021     Lab Results   Component Value Date    LDLCALC 91.6 08/05/2024    LDLCALC 94.2 07/18/2023    CHOL 141 08/05/2024    HDL 35 (L) 08/05/2024    TRIG 72 08/05/2024       Lab Results   Component Value Date     05/15/2024    K 4.2 05/15/2024     05/15/2024    CO2 23 05/15/2024    GLU 86 05/15/2024    BUN 10 05/15/2024    CREATININE 1.3 05/15/2024    CALCIUM 9.5 05/15/2024    PROT 7.7 04/06/2024    ALBUMIN 3.9 05/15/2024    BILITOT 0.8 04/06/2024    ALKPHOS 101 04/06/2024    AST 25 04/06/2024    ALT 25 04/06/2024    ANIONGAP 11 05/15/2024    ESTGFRAFRICA >60.0 06/21/2022    EGFRNONAA 52.0 (A) 06/21/2022    WBC 3.92 08/05/2024    HGB 11.5 (L) 08/05/2024    HGB 12.1 (L) 04/06/2024    HCT 38.0 (L) 08/05/2024    MCV 73 (L) 08/05/2024     08/05/2024     TSH 1.325 08/05/2024    PSA 7.5 (H) 06/21/2022    PSA 2.25 02/11/2012    PSADIAG 7.3 (H) 01/07/2020    PSADIAG 5.7 (H) 05/21/2018    HEPCAB Negative 04/06/2024       Lab Results   Component Value Date    YMSPJJDT14BP 28 (L) 05/08/2012    QQEQSLJU37HB 18 (L) 02/11/2012    FERRITIN 121 05/29/2018    IRON 83 05/29/2018    TRANSFERRIN 258 05/29/2018    TIBC 382 05/29/2018    FESATURATED 22 05/29/2018         Past Medical History:   Diagnosis Date    Arthritis     BPH (benign prostatic hypertrophy)     Hypertension 2012    Sleep apnea      Past Surgical History:   Procedure Laterality Date    CHONDROPLASTY OF KNEE Left 09/22/2020    Procedure: CHONDROPLASTY, KNEE;  Surgeon: Whitley Rojo MD;  Location: [facility] OR;  Service: Orthopedics;  Laterality: Left;    COLONOSCOPY N/A 12/01/2015    Procedure: COLONOSCOPY;  Surgeon: Meagan Hill MD;  Location: [facility] ([address] [place] [address]);  Service: Endoscopy;  Laterality: N/A;    COLONOSCOPY N/A 12/22/2020    Procedure: COLONOSCOPY;  Surgeon: Trenton Harris MD;  Location: [facility] ([address] [place] [address]);  Service: Endoscopy;  Laterality: N/A;  covid test 12/14-[facility]    CYSTOSCOPY WITH INSERTION OF MINIMALLY INVASIVE IMPLANT TO ENLARGE PROSTATIC URETHRA N/A 08/15/2018    Procedure: TRANSPROSTATIC TISSUE RETRACTION;  Surgeon: Trenton Weathers MD;  Location: [facility];  Service: Urology;  Laterality: N/A;    KNEE ARTHROSCOPY W/ MENISCECTOMY Left 09/22/2020    Procedure: ARTHROSCOPY, KNEE, WITH MENISCECTOMY;  Surgeon: Whitley Rojo MD;  Location: [facility] OR;  Service: Orthopedics;  Laterality: Left;    KNEE ARTHROSCOPY W/ PLICA EXCISION Left 09/22/2020    Procedure: EXCISION, PLICA, KNEE, ARTHROSCOPIC;  Surgeon: Whitley Rojo MD;  Location: [facility] OR;  Service: Orthopedics;  Laterality: Left;    SYNOVECTOMY OF KNEE Left 09/22/2020    Procedure: SYNOVECTOMY, KNEE;  Surgeon: Whitley Rojo MD;  Location: [facility] OR;  Service: Orthopedics;  Laterality: Left;    TONSILLECTOMY       Social History     Social  "History Narrative    The patient does not exercise regularly ().      He is not satisfied with weight.    Rates diet as good.    He does not drink at least 1/2 gallon water daily.    He drinks 2 coffee/tea/caffeine-containing soft drinks daily.    Total sleep time at night is 4-5 hours.    He does wear seat belts.    Hobbies include none.             Family History   Problem Relation Name Age of Onset    Cataracts Mother      Diabetes Mother      Hypertension Mother      Stroke Mother      Alzheimer's disease Mother      COPD Sister      Diabetes Sister      Heart attack Sister      Drug abuse Brother      Hypertension Brother Barney     Hypertension Brother Jan     No Known Problems Son Aaron      Vitals:    02/11/25 1134   BP: 126/86   Pulse: 97   SpO2: 95%   Weight: 86 kg (189 lb 11.3 oz)   Height: 5' 6" (1.676 m)   PainSc: 0-No pain     Objective:   Physical Exam    Vitals: Normal blood pressure.  General: No acute distress. Well-developed. Well-nourished.  Eyes: EOMI. Sclerae anicteric.  HENT: Normocephalic. Atraumatic. Nares patent. Moist oral mucosa. No soft spot on head.  Ears: Bilateral TMs clear. Bilateral EACs clear.  Cardiovascular: Regular rate. Regular rhythm. No murmurs. No rubs. No gallops. Normal S1, S2.  Respiratory: Normal respiratory effort. Clear to auscultation bilaterally. No rales. No rhonchi. No wheezing.  Abdomen: Soft. Non-tender. Non-distended. Normoactive bowel sounds.  Musculoskeletal: No  obvious deformity.  Extremities: No lower extremity edema.  Neurological: Alert & oriented x3. No slurred speech. Normal gait.  Psychiatric: Normal mood. Normal affect. Good insight. Good judgment.  Skin: Warm. Dry. No rash.        Assessment/Plan     Assessment & Plan    - Assessed urinary symptoms; increased frequency suggests need for urological follow-up  - Evaluated recent elevated PSA level from July  - Considered potential shingles flare-up based on reported symptoms and fever  - Assessed " "CPAP usage and its impact on various health parameters  - Evaluated scalp for reported "soft spot"; no abnormalities detected on exam  - Opted to try oxybutynin for urinary symptoms     SHINGLES:  - Evaluated the patient's head for reported soft spot, but found no visible signs of shingles or abnormalities.  - Explained the purpose of Valtrex in managing shingles flare-ups.    ELEVATED PSA:  - Noted elevated PSA levels from July.  - Referred the patient to a urologist for follow-up on elevated PSA and increased urinary symptoms.    URINARY SYMPTOMS:  - Noted the patient's report of urinating every 2 hours, including 3-4 times at night.  - Prescribed oxybutynin (Ditropan): Take as directed     SLEEP APNEA:  - Noted the patient's report of using CPAP machine, which helps but is uncomfortable, and stopping breathing during sleep.  - Emphasized the importance of CPAP use for managing sleep apnea and its potential benefits for blood pressure, cholesterol, urinary symptoms, and diabetes.            Elevated PSA  -     Ambulatory referral/consult to Urology; Future; Expected date: 02/18/2025    Post herpetic neuralgia  -     valACYclovir (VALTREX) 1000 MG tablet; Take 1 tablet (1,000 mg total) by mouth every 12 (twelve) hours.  Dispense: 90 tablet; Refill: 0    Urinary frequency  -     oxybutynin (DITROPAN-XL) 10 MG 24 hr tablet; Take 1 tablet (10 mg total) by mouth once daily.  Dispense: 90 tablet; Refill: 1    Knee pain, unspecified chronicity, unspecified laterality  -     Ambulatory Referral/Consult to Physical Therapy/Occupational Therapy; Future; Expected date: 02/18/2025        Chronic conditions status updated as per HPI.  Other than changes above, cont current medications and maintain follow up with specialists.  Return to clinic in Follow up in about 6 months (around 8/11/2025).      Alyssia Park MD  [facility]    Total time spent on this encounter: 32 minutes. This includes face to face time and " non-face to face time preparing to see the patient (eg, review of tests), obtaining and/or reviewing separately obtained history, documenting clinical information in the electronic or other health record, independently interpreting results and communicating results to the patient/family/caregiver, or care coordinator.    This note was generated with the assistance of ambient listening technology. Verbal consent was obtained by the patient and accompanying visitor(s) for the recording of patient appointment to facilitate this note. I attest to having reviewed and edited the generated note for accuracy, though some syntax or spelling errors may persist. Please contact the author of this note for any clarification.       There are no Patient Instructions on file for this visit.  All of your core healthy metrics are met.

## 2025-02-25 NOTE — PROGRESS NOTES
Subjective:      Aaron Guerra is a 69 y.o. male who was referred by Dr. Park for evaluation of his elevated PSA. Established patient of Dr. Weathers's evaluated by Dr. Ramos in 2024.     History of labile PSA. No prior prostate MRI or prostate biopsy.      UroLift done 8/2018 for BPH Dr. Weathers.     Elevated PSA  Patient is here with an elevated PSA. He is unsure of family history of prostate cancer. Voiding well with a good stream. Frequency, urgency and nocturia - improving with ditropan; however with dry mouth. He has no prior genitourinary history of hematuria, hematospermia, prostatitis, UTI, epididymal orchitis.   Previous PSA values are   Component      Latest Ref Rng 8/19/2020 5/19/2021 6/21/2022 12/27/2022   PSA Total      0.00 - 4.00 ng/mL 5.4 (H)  5.2 (H)   6.3 (H)    Prostate Specific Antigen Free      0.00 - 1.50 ng/mL 1.54 (H)  1.44   1.98 (H)    PSA % Free      Not established % 28.52  27.69   31.43    Prostate Specific Antigen      0.00 - 4.00 ng/mL   7.5 (H)       Component      Latest Ref Rng 7/5/2023 7/10/2024   PSA Total      0.00 - 4.00 ng/mL 5.5 (H)  6.2 (H)    Prostate Specific Antigen Free      0.00 - 1.50 ng/mL 1.46  1.86 (H)    PSA % Free      Not established % 26.55  30.00    Prostate Specific Antigen      0.00 - 4.00 ng/mL       He reports retrograde ejaculation and flu like symptoms after intercourse.     The following portions of the patient's history were reviewed and updated as appropriate: allergies, current medications, past family history, past medical history, past social history, past surgical history and problem list.    Review of Systems  Constitutional: no fever or chills  ENT: no nasal congestion or sore throat  Respiratory: no cough or shortness of breath  Cardiovascular: no chest pain or palpitations  Gastrointestinal: no nausea or vomiting, tolerating diet  Genitourinary: as per HPI  Hematologic/Lymphatic: no easy bruising or lymphadenopathy  Musculoskeletal: no arthralgias or  myalgias  Neurological: no seizures or tremors  Behavioral/Psych: no auditory or visual hallucinations     Objective:   Vitals:   Vitals:    02/26/25 1044   BP: 136/81   Pulse: 76     Physical Exam   General: alert and oriented, no acute distress  Head: normocephalic, atraumatic  Neck: supple, normal ROM  Respiratory: Symmetric expansion, non-labored breathing  Cardiovascular: regular rate and rhythm  Abdomen: soft, non tender, non distended  Genitourinary: deferred  Skin: normal coloration and turgor, no rashes, no suspicious skin lesions noted  Neuro: alert and oriented x3, no gross deficits  Psych: normal judgment and insight, normal mood/affect, and non-anxious    Lab Review   Urinalysis demonstrates : negative for all components  Lab Results   Component Value Date    WBC 3.92 08/05/2024    HGB 11.5 (L) 08/05/2024    HCT 38.0 (L) 08/05/2024    MCV 73 (L) 08/05/2024     08/05/2024     Lab Results   Component Value Date    CREATININE 1.3 05/15/2024    BUN 10 05/15/2024     Lab Results   Component Value Date    PSA 7.5 (H) 06/21/2022     Imaging   None    Assessment:     1. OAB (overactive bladder)    2. Elevated PSA    3. BPH with obstruction/lower urinary tract symptoms      Plan:   Diagnoses and all orders for this visit:    OAB (overactive bladder)  -     vibegron (GEMTESA) 75 mg Tab; Take 1 tablet (75 mg total) by mouth once daily.    Elevated PSA  -     Ambulatory referral/consult to Urology  -     POCT URINE DIPSTICK WITHOUT MICROSCOPE  -     MRI Prostate W W/O Contrast; Future  -     Basic Metabolic Panel; Future  -     PSA, Total and Free; Future    BPH with obstruction/lower urinary tract symptoms    Plan:  --D/C ditropan and begin gemtesa  --PSA remains stably elevated  --Will proceed with MRI prostate with BMP and PSA prior

## 2025-02-26 ENCOUNTER — TELEPHONE (OUTPATIENT)
Dept: UROLOGY | Facility: CLINIC | Age: 70
End: 2025-02-26

## 2025-02-26 ENCOUNTER — PATIENT MESSAGE (OUTPATIENT)
Dept: INTERNAL MEDICINE | Facility: CLINIC | Age: 70
End: 2025-02-26
Payer: MEDICARE

## 2025-02-26 ENCOUNTER — OFFICE VISIT (OUTPATIENT)
Dept: UROLOGY | Facility: CLINIC | Age: 70
End: 2025-02-26
Payer: MEDICARE

## 2025-02-26 VITALS
BODY MASS INDEX: 30.47 KG/M2 | OXYGEN SATURATION: 96 % | DIASTOLIC BLOOD PRESSURE: 81 MMHG | HEART RATE: 76 BPM | WEIGHT: 189.63 LBS | HEIGHT: 66 IN | SYSTOLIC BLOOD PRESSURE: 136 MMHG

## 2025-02-26 DIAGNOSIS — R97.20 ELEVATED PSA: ICD-10-CM

## 2025-02-26 DIAGNOSIS — N32.81 OAB (OVERACTIVE BLADDER): Primary | ICD-10-CM

## 2025-02-26 DIAGNOSIS — N13.8 BPH WITH OBSTRUCTION/LOWER URINARY TRACT SYMPTOMS: ICD-10-CM

## 2025-02-26 DIAGNOSIS — N40.1 BPH WITH OBSTRUCTION/LOWER URINARY TRACT SYMPTOMS: ICD-10-CM

## 2025-02-26 LAB
BILIRUB SERPL-MCNC: NORMAL MG/DL
BLOOD URINE, POC: NORMAL
CLARITY, POC UA: CLEAR
COLOR, POC UA: YELLOW
GLUCOSE UR QL STRIP: NORMAL
KETONES UR QL STRIP: NORMAL
LEUKOCYTE ESTERASE URINE, POC: NORMAL
NITRITE, POC UA: NORMAL
PH, POC UA: 6
PROTEIN, POC: NORMAL
SPECIFIC GRAVITY, POC UA: 1.01
UROBILINOGEN, POC UA: NORMAL

## 2025-02-26 PROCEDURE — 1101F PT FALLS ASSESS-DOCD LE1/YR: CPT | Mod: CPTII,S$GLB,, | Performed by: NURSE PRACTITIONER

## 2025-02-26 PROCEDURE — 3288F FALL RISK ASSESSMENT DOCD: CPT | Mod: CPTII,S$GLB,, | Performed by: NURSE PRACTITIONER

## 2025-02-26 PROCEDURE — 1157F ADVNC CARE PLAN IN RCRD: CPT | Mod: CPTII,S$GLB,, | Performed by: NURSE PRACTITIONER

## 2025-02-26 PROCEDURE — 81002 URINALYSIS NONAUTO W/O SCOPE: CPT | Mod: S$GLB,,, | Performed by: NURSE PRACTITIONER

## 2025-02-26 PROCEDURE — 3075F SYST BP GE 130 - 139MM HG: CPT | Mod: CPTII,S$GLB,, | Performed by: NURSE PRACTITIONER

## 2025-02-26 PROCEDURE — 3079F DIAST BP 80-89 MM HG: CPT | Mod: CPTII,S$GLB,, | Performed by: NURSE PRACTITIONER

## 2025-02-26 PROCEDURE — 1159F MED LIST DOCD IN RCRD: CPT | Mod: CPTII,S$GLB,, | Performed by: NURSE PRACTITIONER

## 2025-02-26 PROCEDURE — 3008F BODY MASS INDEX DOCD: CPT | Mod: CPTII,S$GLB,, | Performed by: NURSE PRACTITIONER

## 2025-02-26 PROCEDURE — 1126F AMNT PAIN NOTED NONE PRSNT: CPT | Mod: CPTII,S$GLB,, | Performed by: NURSE PRACTITIONER

## 2025-02-26 PROCEDURE — 99214 OFFICE O/P EST MOD 30 MIN: CPT | Mod: S$GLB,,, | Performed by: NURSE PRACTITIONER

## 2025-02-26 RX ORDER — VIBEGRON 75 MG/1
75 TABLET, FILM COATED ORAL DAILY
Qty: 30 TABLET | Refills: 11 | Status: SHIPPED | OUTPATIENT
Start: 2025-02-26 | End: 2026-02-26

## 2025-02-27 ENCOUNTER — TELEPHONE (OUTPATIENT)
Dept: UROLOGY | Facility: CLINIC | Age: 70
End: 2025-02-27
Payer: MEDICARE

## 2025-02-27 NOTE — TELEPHONE ENCOUNTER
Pt called staff to get scheduled for appt. Pt was informed of all available MRI appts. Pt put call on hold because he was getting another call from [facility]. Pt then disconnected the call.

## 2025-03-24 ENCOUNTER — HOSPITAL ENCOUNTER (OUTPATIENT)
Dept: RADIOLOGY | Facility: HOSPITAL | Age: 70
Discharge: HOME | End: 2025-03-24
Attending: NURSE PRACTITIONER
Payer: MEDICARE

## 2025-03-24 DIAGNOSIS — R97.20 ELEVATED PSA: ICD-10-CM

## 2025-03-24 PROCEDURE — 25500020 PHARM REV CODE 255: Mod: HCNC | Performed by: NURSE PRACTITIONER

## 2025-03-24 PROCEDURE — A9585 GADOBUTROL INJECTION: HCPCS | Mod: HCNC | Performed by: NURSE PRACTITIONER

## 2025-03-24 PROCEDURE — 72197 MRI PELVIS W/O & W/DYE: CPT | Mod: TC,HCNC

## 2025-03-24 PROCEDURE — 72197 MRI PELVIS W/O & W/DYE: CPT | Mod: 26,HCNC,, | Performed by: STUDENT IN AN ORGANIZED HEALTH CARE EDUCATION/TRAINING PROGRAM

## 2025-03-24 RX ORDER — GADOBUTROL 604.72 MG/ML
10 INJECTION INTRAVENOUS
Status: COMPLETED | OUTPATIENT
Start: 2025-03-24 | End: 2025-03-24

## 2025-03-24 RX ADMIN — GADOBUTROL 10 ML: 604.72 INJECTION INTRAVENOUS at 02:03

## 2025-03-25 ENCOUNTER — RESULTS FOLLOW-UP (OUTPATIENT)
Dept: UROLOGY | Facility: CLINIC | Age: 70
End: 2025-03-25

## 2025-04-07 ENCOUNTER — TELEPHONE (OUTPATIENT)
Dept: UROLOGY | Facility: CLINIC | Age: 70
End: 2025-04-07
Payer: MEDICARE

## 2025-04-07 DIAGNOSIS — N32.81 OAB (OVERACTIVE BLADDER): ICD-10-CM

## 2025-04-07 NOTE — TELEPHONE ENCOUNTER
----- Message from Vicki sent at 4/7/2025  3:57 PM CDT -----  Type: Patient CallWho Called: patient Does the patient know what this is regarding? Pt is requesting a call to discuss that [facility] wants to get that the prescription for RX gemtesa returned faxed to [phone]. Please advise Does the patient rather a call back or a response via [facility]? [facility] Call Back Number: [phone] Additional Information: RX is currently at [facility] and wants it transferred to [facility] - [facility] [address] [facility] [facility] [address]Hcdvm: [phone] Fax: [phone]

## 2025-04-08 DIAGNOSIS — B02.29 POST HERPETIC NEURALGIA: ICD-10-CM

## 2025-04-08 RX ORDER — VALACYCLOVIR HYDROCHLORIDE 1 G/1
1000 TABLET, FILM COATED ORAL EVERY 12 HOURS
Qty: 180 TABLET | Refills: 1 | Status: SHIPPED | OUTPATIENT
Start: 2025-04-08

## 2025-04-08 RX ORDER — VIBEGRON 75 MG/1
75 TABLET, FILM COATED ORAL DAILY
Qty: 90 TABLET | Refills: 3 | Status: SHIPPED | OUTPATIENT
Start: 2025-04-08 | End: 2026-04-08

## 2025-04-08 NOTE — TELEPHONE ENCOUNTER
Care Due:                  Date            Visit Type   Department     Provider  --------------------------------------------------------------------------------                                EP -                              PRIMARY      [facility] INTER[facility]  Last Visit: 02-      CARE ([facility])   MEDICINE       Alyssia Park                              EP -                              PRIMARY      [facility] INTER[facility]  Next Visit: 08-      CARE ([facility])   MEDICINE       Alyssia Park                                                            Last  Test          Frequency    Reason                     Performed    Due Date  --------------------------------------------------------------------------------    CMP.........  12 months..  atorvastatin.............  04- 04-    [facility] Embedded Care Due Messages. Reference number: [id number].   4/08/2025 8:50:09 AM CDT

## 2025-04-08 NOTE — TELEPHONE ENCOUNTER
Refill Routing Note   Medication(s) are not appropriate for processing by [facility] for the following reason(s):        New or recently adjusted medication    [facility] action(s):  Defer   Requires labs : Yes      Medication Therapy Plan: FOVS      Appointments  past 12m or future 3m with PCP    Date Provider   Last Visit   2/11/2025 Alyssia Park MD   Next Visit   8/25/2025 Alyssia Park MD   ED visits in past 90 days: 0        Note composed:9:06 AM 04/08/2025

## 2025-04-15 ENCOUNTER — OFFICE VISIT (OUTPATIENT)
Dept: UROLOGY | Facility: CLINIC | Age: 70
End: 2025-04-15
Payer: MEDICARE

## 2025-04-15 VITALS
OXYGEN SATURATION: 97 % | BODY MASS INDEX: 30.47 KG/M2 | DIASTOLIC BLOOD PRESSURE: 88 MMHG | WEIGHT: 189.63 LBS | HEIGHT: 66 IN | HEART RATE: 80 BPM | SYSTOLIC BLOOD PRESSURE: 149 MMHG | RESPIRATION RATE: 18 BRPM

## 2025-04-15 DIAGNOSIS — N32.81 OAB (OVERACTIVE BLADDER): ICD-10-CM

## 2025-04-15 DIAGNOSIS — R97.20 ELEVATED PSA: Primary | ICD-10-CM

## 2025-04-15 DIAGNOSIS — N52.01 ERECTILE DYSFUNCTION DUE TO ARTERIAL INSUFFICIENCY: ICD-10-CM

## 2025-04-15 LAB
BILIRUB SERPL-MCNC: NORMAL MG/DL
BLOOD URINE, POC: NORMAL
CLARITY, POC UA: CLEAR
COLOR, POC UA: YELLOW
GLUCOSE UR QL STRIP: NORMAL
KETONES UR QL STRIP: NORMAL
LEUKOCYTE ESTERASE URINE, POC: NORMAL
NITRITE, POC UA: NORMAL
PH, POC UA: 5
PROTEIN, POC: NORMAL
SPECIFIC GRAVITY, POC UA: 1.01
UROBILINOGEN, POC UA: NORMAL

## 2025-04-15 RX ORDER — PAPAVERINE HYDROCHLORIDE 30 MG/ML
INJECTION INTRAMUSCULAR; INTRAVENOUS
Qty: 10 ML | Refills: 12 | Status: SHIPPED | OUTPATIENT
Start: 2025-04-15

## 2025-04-15 NOTE — PROGRESS NOTES
Subjective:      Aaron Guerra is a 70 y.o. male who returns today to review prostate MRI.     History of labile PSA. No prior prostate MRI or prostate biopsy.      UroLift done 8/2018 for BPH Dr. Weathers.      Elevated PSA  Patient is here with an elevated PSA. He is unsure of family history of prostate cancer. Voiding well with a good stream. Frequency, urgency and nocturia - improving with ditropan; however with dry mouth. He has no prior genitourinary history of hematuria, hematospermia, prostatitis, UTI, epididymal orchitis.   Previous PSA values are   Component      Latest Ref Rng 8/19/2020 5/19/2021 6/21/2022 12/27/2022   PSA Total      0.00 - 4.00 ng/mL 5.4 (H)  5.2 (H)    6.3 (H)    Prostate Specific Antigen Free      0.00 - 1.50 ng/mL 1.54 (H)  1.44    1.98 (H)    PSA % Free      Not established % 28.52  27.69    31.43    Prostate Specific Antigen      0.00 - 4.00 ng/mL     7.5 (H)         Component      Latest Ref Rng 7/5/2023 7/10/2024   PSA Total      0.00 - 4.00 ng/mL 5.5 (H)  6.2 (H)    Prostate Specific Antigen Free      0.00 - 1.50 ng/mL 1.46  1.86 (H)    PSA % Free      Not established % 26.55  30.00    Prostate Specific Antigen      0.00 - 4.00 ng/mL          He reports retrograde ejaculation and flu like symptoms after intercourse.     Returns today with prostate MRI.     Gemtesa is working well for OAB symptoms.    Inquiring about other options for ED- poor response with cialis.     The following portions of the patient's history were reviewed and updated as appropriate: allergies, current medications, past family history, past medical history, past social history, past surgical history and problem list.    Review of Systems  Constitutional: no fever or chills  ENT: no nasal congestion or sore throat  Respiratory: no cough or shortness of breath  Cardiovascular: no chest pain or palpitations  Gastrointestinal: no nausea or vomiting, tolerating diet  Genitourinary: as per HPI  Hematologic/Lymphatic:  no easy bruising or lymphadenopathy  Musculoskeletal: no arthralgias or myalgias  Neurological: no seizures or tremors  Behavioral/Psych: no auditory or visual hallucinations     Objective:   Vitals:   Vitals:    04/15/25 1004   BP: (!) 149/88   Pulse: 80   Resp: 18     Physical Exam   General: alert and oriented, no acute distress  Head: normocephalic, atraumatic  Neck: supple, normal ROM  Respiratory: Symmetric expansion, non-labored breathing  Cardiovascular: regular rate and rhythm  Abdomen: soft, non tender, non distended  Genitourinary: deferred  Skin: normal coloration and turgor, no rashes, no suspicious skin lesions noted  Neuro: alert and oriented x3, no gross deficits  Psych: normal judgment and insight, normal mood/affect, and non-anxious    Lab Review   Urinalysis demonstrates : trace protein  Lab Results   Component Value Date    WBC 3.92 08/05/2024    HGB 11.5 (L) 08/05/2024    HCT 38.0 (L) 08/05/2024    MCV 73 (L) 08/05/2024     08/05/2024     Lab Results   Component Value Date    CREATININE 1.2 03/24/2025    BUN 13 03/24/2025     Lab Results   Component Value Date    PSA 8.84 (H) 03/24/2025    PSA 7.5 (H) 06/21/2022     Imaging   (all images personally reviewed; agree with report below)  MRI PROSTATE W W/O CONTRAST     CLINICAL HISTORY:  ELEVATED PSA;  Elevated prostate specific antigen (PSA)     TECHNIQUE:  MRI prostate in accordance with PI-RADS recommendations before and after intravenous administration of 10 mL Gadavist.     COMPARISON:  None.     FINDINGS:  PSA: 6.2 ng/mL on 07/10/2024.     Previous biopsy: None.     Prior therapy: None.     Prostate: 5.8 x 5.3 x 5.4 cm corresponding to a computed volume of 77.0 cc.     There are foci of susceptibility artifact within the prostate.     Peripheral zone: Atrophic. There are exophytic BPH nodules.  Linear/wedge-shaped regions of mild T2 hypointense, mild DWI hyperintense, and/or mild ADC hypointense signal in the peripheral zone  bilaterally. No focal lesion concerning for prostate cancer.     Transitional zone: Benign prostatic hyperplasia. No focal lesion concerning for prostate cancer.     Neurovascular bundle: Normal appearance.     Seminal vesicles: Normal appearance.     Other pelvic organs: Bladder and rectum are unremarkable.  Sigmoid diverticulosis.     Lymph nodes: No lymphadenopathy.     Soft tissues/bones: No marrow replacing lesions.     Impression:     Benign prostatic hyperplasia.  No focal lesion concerning for prostate cancer.     Overall Assessment: PI-RADS 2 - Low (clinically significant cancer is unlikely to be present).     Number of targets created for potential MR/US fusion biopsy:     Peripheral zone: 0     Transition zone: 0  Assessment:     1. Elevated PSA    2. Erectile dysfunction due to arterial insufficiency    3. OAB (overactive bladder)      Plan:   Diagnoses and all orders for this visit:    Elevated PSA  -     PSA, Total and Free; Future    Erectile dysfunction due to arterial insufficiency  -     papaverine 30 mg/mL injection; Add phentolamine 10 mgs/ml., add PGE1  100 micrograms.    OAB (overactive bladder)    Plan:  --Reviewed MRI findings  --PIRADS 2 with normal PSA density, no need for biopsy at this time  --PSA in 6 months  --Continue gemtesa   --Follow up for trimix trial

## 2025-04-15 NOTE — PATIENT INSTRUCTIONS
Penile Self-Injection Procedure  Dosage: 0.1-0.5 cc  Use no more than 3x/week   Self-injection is a good option if you have erectile dysfunction (ED). You insert a tiny needle into your penis and inject a medicine. This helps your penis get hard and stay that way long enough for sex. And sex and orgasm will feel as good as always. You may be nervous about doing self-injection at first. But with practice, it will get easier. Your healthcare provider will show you how to do self-injection the first time.  Talk to your doctor about any medicines you take and any medical problems you have.      Preparing for injection  Wash your hands well with soap and water.  Prepare the medicine (if needed).  Sit or  a comfortable position in a warm, well-lit room. If you need to, sit or  front of a mirror.  Find an injection site on one side of your penis, in a place with no visible veins. (Dont inject into the top, bottom, or head of the penis.)  Clean the injection site with an alcohol swab. Grasp the head of your penis firmly with your thumb and forefinger (dont just pinch the skin). Stretch the penis so the skin on the shaft is taut.  Injecting the medicine  Rest your penis against your inner thigh and pull it gently toward your knee. Dont twist or rotate it. This way youll be sure to inject the medicine into the spot you chose and cleaned before.  Hold the syringe between your thumb and fingers, like youre holding a pen. Rest your forearm on your thigh for support.  Insert the needle at a 90° angle (perpendicular) to the shaft. Do this quickly to reduce discomfort. (The needle should go in easily. If it doesnt, stop right away.)  Move your thumb to the plunger. Press down to inject the medicine, counting to 5.  Remove the needle and dispose of it safely.  Gaining an erection  Apply pressure to the injection site for a few minutes. This prevents swelling and bruising and helps spread the medicine.   Stand  up. This may help your erection develop. Foreplay often helps, too.  Your penis should become firm within 10 to 20 minutes. The erection will last long enough for sex, and maybe longer.  When to seek medical care  An erection that lasts longer than 3 to 4  hours  Bleeding or bruising  Severe pain  Scarring or curvature of the penis   Date Last Reviewed: 1/7/2017  © 1294-8412 [employer]. [address] [place] [address], [place] [address]. All rights reserved. This information is not intended as a substitute for professional medical care. Always follow your healthcare professional's instructions.

## 2025-05-22 ENCOUNTER — PATIENT MESSAGE (OUTPATIENT)
Dept: INTERNAL MEDICINE | Facility: CLINIC | Age: 70
End: 2025-05-22
Payer: MEDICARE

## 2025-05-23 ENCOUNTER — OFFICE VISIT (OUTPATIENT)
Dept: URGENT CARE | Facility: CLINIC | Age: 70
End: 2025-05-23
Payer: MEDICARE

## 2025-05-23 VITALS
HEIGHT: 66 IN | SYSTOLIC BLOOD PRESSURE: 141 MMHG | TEMPERATURE: 99 F | WEIGHT: 189 LBS | OXYGEN SATURATION: 98 % | RESPIRATION RATE: 16 BRPM | BODY MASS INDEX: 30.37 KG/M2 | DIASTOLIC BLOOD PRESSURE: 86 MMHG | HEART RATE: 86 BPM

## 2025-05-23 DIAGNOSIS — R07.82 INTERCOSTAL PAIN: ICD-10-CM

## 2025-05-23 DIAGNOSIS — K21.9 CHEST PAIN DUE TO GERD: Primary | ICD-10-CM

## 2025-05-23 DIAGNOSIS — R07.9 CHEST PAIN DUE TO GERD: Primary | ICD-10-CM

## 2025-05-23 DIAGNOSIS — R14.2 BELCHING: ICD-10-CM

## 2025-05-23 RX ORDER — LIDOCAINE HYDROCHLORIDE 20 MG/ML
10 SOLUTION OROPHARYNGEAL
Status: COMPLETED | OUTPATIENT
Start: 2025-05-23 | End: 2025-05-23

## 2025-05-23 RX ORDER — ALUMINUM HYDROXIDE, MAGNESIUM HYDROXIDE, AND SIMETHICONE 2400; 240; 2400 MG/30ML; MG/30ML; MG/30ML
10 SUSPENSION ORAL EVERY 6 HOURS PRN
Qty: 335 ML | Refills: 0 | Status: SHIPPED | OUTPATIENT
Start: 2025-05-23 | End: 2025-06-22

## 2025-05-23 RX ORDER — DICYCLOMINE HYDROCHLORIDE 20 MG/1
20 TABLET ORAL
Status: COMPLETED | OUTPATIENT
Start: 2025-05-23 | End: 2025-05-23

## 2025-05-23 RX ORDER — OMEPRAZOLE 40 MG/1
40 CAPSULE, DELAYED RELEASE ORAL DAILY
Qty: 90 CAPSULE | Refills: 3 | Status: SHIPPED | OUTPATIENT
Start: 2025-05-23 | End: 2026-05-23

## 2025-05-23 RX ORDER — ALUMINUM HYDROXIDE, MAGNESIUM HYDROXIDE, AND SIMETHICONE 1200; 120; 1200 MG/30ML; MG/30ML; MG/30ML
30 SUSPENSION ORAL
Status: COMPLETED | OUTPATIENT
Start: 2025-05-23 | End: 2025-05-23

## 2025-05-23 RX ADMIN — LIDOCAINE HYDROCHLORIDE 10 ML: 20 SOLUTION OROPHARYNGEAL at 03:05

## 2025-05-23 RX ADMIN — ALUMINUM HYDROXIDE, MAGNESIUM HYDROXIDE, AND SIMETHICONE 30 ML: 1200; 120; 1200 SUSPENSION ORAL at 03:05

## 2025-05-23 RX ADMIN — DICYCLOMINE HYDROCHLORIDE 20 MG: 20 TABLET ORAL at 03:05

## 2025-05-23 NOTE — PATIENT INSTRUCTIONS
Discharge instructions for Chest pain due to GERD, Intercostal pain and Belching.  Avoid food high in fats, chocolate, caffeine, mints, alcohol, citrus , oils, spicy acidic.  Life style modifications , with smaller meals and avoid lying down for 3 hours after eating.  Avoid NSAIDs and steroids  Referral submitted to primary care physician  Referral submitted to Gastroenterology  Referral submitted to Cardiology  Start Omeprazole  as prescribed  Discussed with patient for muscular discomfort to chest patient can take Tylenol 500 mg 2 tablets by mouth every 6 hours p.r.n. for pain    Discussed with patient and wife strong emergency room protocol for any change in status such as chest pain that is unrelieved, shortness of breath, diaphoretic ,sweaty,  feeling of weakness and faint, difficulty with speech and changes in level of consciousness.    1) See orders for this visit as documented in the electronic medical record.  2) Symptomatic therapy suggested: use acetaminophen/ibuprofen every 6-8 hours prn pain or fever, push fluids.   3) Call or return to clinic prn if these symptoms worsen or fail to improve as anticipated.    Discussed results/diagnosis/plan with patient in clinic.  We had shared decision making for patient's treatment. Patient verbalized understanding and in agreement with current treatment plan.     Patient was instructed to return for re-evaluation with urgent care or PCP for continued outpatient workup and management if symptoms do not improve/worsening symptoms. Strict ED versus clinic precautions given in depth.    Discharge and follow-up instructions given verbally/printed with the patient who expressed understanding. The instructions and results are also available on [employer].      - You must understand that you have received an Urgent Care treatment only and that you may be released before all of your medical problems are known or treated.   - You, the patient, will arrange for follow up care as  instructed.   - Follow up with your PCP or specialty clinic as directed in the next 1-2 weeks if not improved or as needed.  You can call [phone] to schedule an appointment with the appropriate provider.   - If your condition worsens or fails to improve we recommend that you receive another evaluation at the ER immediately or contact your PCP to discuss your concerns or return here.        RADHA Prescott

## 2025-05-23 NOTE — PROGRESS NOTES
"Subjective:      Patient ID: Aaron Guerra is a 70 y.o. male.    Vitals:  height is 5' 6" (1.676 m) and weight is 85.7 kg (189 lb). His temperature is 98.6 °F (37 °C). His blood pressure is 141/86 (abnormal) and his pulse is 86. His respiration is 16 and oxygen saturation is 98%.     Chief Complaint: Chest Pain    70 y.o male presents to clinic c.o left side chest pain x 3 weeks. Pt states the pain starts in his left arm and goes up just about to the clavicle area. Pt states the pain sometimes subsides when belching, but its got to the point that the pain is waking him up.  Patient states his pain is positional and when lying on the left side he will wake up from his sleep.  Patient reports eating chicken with alfaro last night and rice at 9:00 pm at night.  Wife is with patient in exam room and states that he will wake up in the morning and start belching 1st thing.  Patient reports feeling discomfort when pressing on left side of chest. Patient denies shortness of breath, and becoming diaphoretic, patient denies  pain radiating to jaw.      Chest Pain   This is a new problem. The current episode started 1 to 4 weeks ago. The problem has been gradually worsening. The pain radiates to the left arm. Pertinent negatives include no dizziness, fever, nausea or vomiting.       Constitution: Negative for chills and fever.   Cardiovascular:  Positive for chest pain.   Gastrointestinal:  Negative for nausea, vomiting and diarrhea.        Belching with bad taste in mouth.   Musculoskeletal:  Positive for pain.   Skin:  Negative for color change and rash.   Neurological:  Negative for dizziness, light-headedness, passing out, facial drooping, speech difficulty, coordination disturbances, loss of balance, disorientation, altered mental status, loss of consciousness and tingling.   Psychiatric/Behavioral:  Negative for altered mental status and disorientation.       Objective:     Physical Exam   Constitutional: He is oriented to " person, place, and time. He appears well-developed. He is cooperative.  Non-toxic appearance. He does not appear ill. No distress. awake  HENT:   Head: Normocephalic and atraumatic.   Ears:   Right Ear: Hearing, tympanic membrane, external ear and ear canal normal.   Left Ear: Hearing, tympanic membrane, external ear and ear canal normal.   Nose: Nose normal. No mucosal edema, rhinorrhea, nasal deformity or congestion. No epistaxis. Right sinus exhibits no maxillary sinus tenderness and no frontal sinus tenderness. Left sinus exhibits no maxillary sinus tenderness and no frontal sinus tenderness.   Mouth/Throat: Uvula is midline, oropharynx is clear and moist and mucous membranes are normal. No trismus in the jaw. Normal dentition. No uvula swelling. No oropharyngeal exudate or posterior oropharyngeal erythema.   Eyes: Conjunctivae and lids are normal. Right eye exhibits no discharge. Left eye exhibits no discharge. No scleral icterus.   Neck: Trachea normal and phonation normal. Neck supple. No thyromegaly present.   Cardiovascular: Normal rate, regular rhythm, S1 normal, S2 normal, normal heart sounds and normal pulses.   Pulmonary/Chest: Effort normal and breath sounds normal. No respiratory distress. He has no decreased breath sounds. He has no wheezes. He has no rhonchi. He has no rales.   Abdominal: Normal appearance and bowel sounds are normal. He exhibits no distension and no mass. Soft. protuberant There is no abdominal tenderness. There is no left CVA tenderness and no right CVA tenderness.   Musculoskeletal: Normal range of motion.         General: No deformity. Normal range of motion.        Arms:       Right lower leg: No edema.      Left lower leg: No edema.      Comments: Reproducible intercostal discomfort upon palpation.   Lymphadenopathy:     He has no cervical adenopathy.   Neurological: no focal deficit. He is alert and oriented to person, place, and time. He has normal motor skills, normal  sensation and intact cranial nerves (2-12). He exhibits normal muscle tone. Gait and coordination normal. Coordination normal. GCS eye subscore is 4. GCS verbal subscore is 5. GCS motor subscore is 6.   Skin: Skin is warm, dry, intact, not diaphoretic and not pale. Capillary refill takes less than 2 seconds.   Psychiatric: His speech is normal and behavior is normal. Mood, judgment and thought content normal.   Nursing note and vitals reviewed.      Assessment:     1. Chest pain due to GERD    2. Intercostal pain    3. Belching      Discussed with patient findings patient had EKG done in clinic today which shows normal sinus rhythm,  normal EKG and  there is no change since compared EKG of 04/06/2024 and 12/08/2021 .  Patient also had stress echo done 12/13/2021 which is negative for ischemia.  Plan:       Chest pain due to GERD  -     omeprazole (PRILOSEC) 40 MG capsule; Take 1 capsule (40 mg total) by mouth once daily.  Dispense: 90 capsule; Refill: 3  -     aluminum & magnesium hydroxide-simethicone (MAALOX MAXIMUM STRENGTH) 400-400-40 mg/5 mL suspension; Take 10 mLs by mouth every 6 (six) hours as needed for Indigestion.  Dispense: 335 mL; Refill: 0    Intercostal pain  -     Ambulatory referral/consult to Internal Medicine    Belching  -     aluminum-magnesium hydroxide-simethicone 200-200-20 mg/5 mL suspension 30 mL  -     dicyclomine tablet 20 mg  -     LIDOcaine viscous HCl 2% oral solution 10 mL  -     Ambulatory referral/consult to Internal Medicine      Patient Instructions   Discharge instructions for Chest pain due to GERD, Intercostal pain and Belching.  Avoid food high in fats, chocolate, caffeine, mints, alcohol, citrus , oils, spicy acidic.  Life style modifications , with smaller meals and avoid lying down for 3 hours after eating.  Avoid NSAIDs and steroids  Referral submitted to primary care physician  Referral submitted to Gastroenterology  Referral submitted to Cardiology  Start Omeprazole  as  prescribed  Discussed with patient for muscular discomfort to chest patient can take Tylenol 500 mg 2 tablets by mouth every 6 hours p.r.n. for pain    Discussed with patient and wife strong emergency room protocol for any change in status such as chest pain that is unrelieved, shortness of breath, diaphoretic ,sweaty,  feeling of weakness and faint, difficulty with speech and changes in level of consciousness.    1) See orders for this visit as documented in the electronic medical record.  2) Symptomatic therapy suggested: use acetaminophen/ibuprofen every 6-8 hours prn pain or fever, push fluids.   3) Call or return to clinic prn if these symptoms worsen or fail to improve as anticipated.    Discussed results/diagnosis/plan with patient in clinic.  We had shared decision making for patient's treatment. Patient verbalized understanding and in agreement with current treatment plan.     Patient was instructed to return for re-evaluation with urgent care or PCP for continued outpatient workup and management if symptoms do not improve/worsening symptoms. Strict ED versus clinic precautions given in depth.    Discharge and follow-up instructions given verbally/printed with the patient who expressed understanding. The instructions and results are also available on [employer].      - You must understand that you have received an Urgent Care treatment only and that you may be released before all of your medical problems are known or treated.   - You, the patient, will arrange for follow up care as instructed.   - Follow up with your PCP or specialty clinic as directed in the next 1-2 weeks if not improved or as needed.  You can call [phone] to schedule an appointment with the appropriate provider.   - If your condition worsens or fails to improve we recommend that you receive another evaluation at the ER immediately or contact your PCP to discuss your concerns or return here.        Velia Caceres,  FNP

## 2025-05-26 LAB
OHS QRS DURATION: 82 MS
OHS QTC CALCULATION: 422 MS

## 2025-06-03 ENCOUNTER — OFFICE VISIT (OUTPATIENT)
Dept: SLEEP MEDICINE | Facility: CLINIC | Age: 70
End: 2025-06-03
Payer: MEDICARE

## 2025-06-03 VITALS
WEIGHT: 185 LBS | HEART RATE: 80 BPM | DIASTOLIC BLOOD PRESSURE: 84 MMHG | SYSTOLIC BLOOD PRESSURE: 138 MMHG | HEIGHT: 66 IN | BODY MASS INDEX: 29.73 KG/M2

## 2025-06-03 DIAGNOSIS — I10 PRIMARY HYPERTENSION: ICD-10-CM

## 2025-06-03 DIAGNOSIS — G47.33 OSA (OBSTRUCTIVE SLEEP APNEA): Primary | ICD-10-CM

## 2025-06-03 PROCEDURE — 99212 OFFICE O/P EST SF 10 MIN: CPT | Mod: S$GLB,,, | Performed by: NURSE PRACTITIONER

## 2025-06-03 PROCEDURE — 1126F AMNT PAIN NOTED NONE PRSNT: CPT | Mod: CPTII,S$GLB,, | Performed by: NURSE PRACTITIONER

## 2025-06-03 PROCEDURE — 1101F PT FALLS ASSESS-DOCD LE1/YR: CPT | Mod: CPTII,S$GLB,, | Performed by: NURSE PRACTITIONER

## 2025-06-03 PROCEDURE — 1157F ADVNC CARE PLAN IN RCRD: CPT | Mod: CPTII,S$GLB,, | Performed by: NURSE PRACTITIONER

## 2025-06-03 PROCEDURE — 3008F BODY MASS INDEX DOCD: CPT | Mod: CPTII,S$GLB,, | Performed by: NURSE PRACTITIONER

## 2025-06-03 PROCEDURE — 3079F DIAST BP 80-89 MM HG: CPT | Mod: CPTII,S$GLB,, | Performed by: NURSE PRACTITIONER

## 2025-06-03 PROCEDURE — 3288F FALL RISK ASSESSMENT DOCD: CPT | Mod: CPTII,S$GLB,, | Performed by: NURSE PRACTITIONER

## 2025-06-03 PROCEDURE — 1159F MED LIST DOCD IN RCRD: CPT | Mod: CPTII,S$GLB,, | Performed by: NURSE PRACTITIONER

## 2025-06-03 PROCEDURE — 99999 PR PBB SHADOW E&M-EST. PATIENT-LVL III: CPT | Mod: PBBFAC,,, | Performed by: NURSE PRACTITIONER

## 2025-06-03 PROCEDURE — 3075F SYST BP GE 130 - 139MM HG: CPT | Mod: CPTII,S$GLB,, | Performed by: NURSE PRACTITIONER

## 2025-06-06 ENCOUNTER — TELEPHONE (OUTPATIENT)
Dept: INTERNAL MEDICINE | Facility: CLINIC | Age: 70
End: 2025-06-06
Payer: MEDICARE

## 2025-07-31 ENCOUNTER — PATIENT MESSAGE (OUTPATIENT)
Dept: INTERNAL MEDICINE | Facility: CLINIC | Age: 70
End: 2025-07-31
Payer: MEDICARE

## 2025-07-31 DIAGNOSIS — J30.89 NON-SEASONAL ALLERGIC RHINITIS DUE TO OTHER ALLERGIC TRIGGER: ICD-10-CM

## 2025-08-01 RX ORDER — FLUTICASONE PROPIONATE 50 MCG
2 SPRAY, SUSPENSION (ML) NASAL DAILY
Qty: 16 G | Refills: 0 | Status: SHIPPED | OUTPATIENT
Start: 2025-08-01

## 2025-08-01 NOTE — TELEPHONE ENCOUNTER
Medication pended.  Allergies reviewed.  LOV: 2/11/2025      Refill Encounter    PCP Visits: Recent Visits  Date Type Provider Dept   02/11/25 Office Visit Alyssia Park MD [facility] Internal Medicine   Showing recent visits within past 360 days and meeting all other requirements  Future Appointments  Date Type Provider Dept   08/11/25 Appointment Alyssia Park MD [facility] Internal Medicine   Showing future appointments within next 720 days and meeting all other requirements      Last 3 Blood Pressure:   BP Readings from Last 3 Encounters:   06/03/25 138/84   05/23/25 (!) 141/86   04/15/25 (!) 149/88     Preferred Pharmacy:   [facility] - [place], [place] - [address]  [address] [address]  Phone: [phone] Fax: [phone]    Requested RX:  Requested Prescriptions      fluticasone propionate (FLONASE) 50 mcg/actuation nasal spray       RX Route: Normal

## 2025-08-01 NOTE — TELEPHONE ENCOUNTER
Care Due:                  Date            Visit Type   Department     Provider  --------------------------------------------------------------------------------                                EP -                              PRIMARY      [facility]  Last Visit: 02-      CARE ([facility])   MEDICINE       Alyssia Park                              EP -                              PRIMARY      [facility]  Next Visit: 08-      [facility] ([facility])   MEDICINE       Alyssia Park                                                            Last  Test          Frequency    Reason                     Performed    Due Date  --------------------------------------------------------------------------------    CBC.........  12 months..  valACYclovir.............  08- 07-    CMP.........  12 months..  atorvastatin.............  04- 04-    Lipid Panel.  12 months..  atorvastatin.............  08- 07-    [facility] Embedded Care Due Messages. Reference number: [id number].   8/01/2025 8:42:24 AM CDT

## 2025-08-07 ENCOUNTER — TELEPHONE (OUTPATIENT)
Dept: INTERNAL MEDICINE | Facility: CLINIC | Age: 70
End: 2025-08-07
Payer: MEDICARE

## 2025-08-07 DIAGNOSIS — R73.03 PREDIABETES: ICD-10-CM

## 2025-08-11 ENCOUNTER — OFFICE VISIT (OUTPATIENT)
Dept: INTERNAL MEDICINE | Facility: CLINIC | Age: 70
End: 2025-08-11
Payer: MEDICARE

## 2025-08-11 ENCOUNTER — LAB VISIT (OUTPATIENT)
Dept: LAB | Facility: OTHER | Age: 70
End: 2025-08-11
Attending: INTERNAL MEDICINE
Payer: MEDICARE

## 2025-08-11 VITALS
SYSTOLIC BLOOD PRESSURE: 146 MMHG | WEIGHT: 193.31 LBS | OXYGEN SATURATION: 96 % | DIASTOLIC BLOOD PRESSURE: 82 MMHG | BODY MASS INDEX: 31.07 KG/M2 | HEIGHT: 66 IN | HEART RATE: 64 BPM

## 2025-08-11 DIAGNOSIS — E78.2 MIXED HYPERLIPIDEMIA: ICD-10-CM

## 2025-08-11 DIAGNOSIS — Z00.00 ROUTINE GENERAL MEDICAL EXAMINATION AT A HEALTH CARE FACILITY: Primary | ICD-10-CM

## 2025-08-11 DIAGNOSIS — Z76.0 ENCOUNTER FOR MEDICATION REFILL: ICD-10-CM

## 2025-08-11 DIAGNOSIS — R73.03 PREDIABETES: ICD-10-CM

## 2025-08-11 DIAGNOSIS — N13.8 BPH WITH OBSTRUCTION/LOWER URINARY TRACT SYMPTOMS: ICD-10-CM

## 2025-08-11 DIAGNOSIS — N40.1 BPH WITH OBSTRUCTION/LOWER URINARY TRACT SYMPTOMS: ICD-10-CM

## 2025-08-11 DIAGNOSIS — I10 PRIMARY HYPERTENSION: ICD-10-CM

## 2025-08-11 DIAGNOSIS — Z23 NEED FOR VACCINATION: ICD-10-CM

## 2025-08-11 DIAGNOSIS — Z00.00 ROUTINE GENERAL MEDICAL EXAMINATION AT A HEALTH CARE FACILITY: ICD-10-CM

## 2025-08-11 LAB
ABSOLUTE EOSINOPHIL (OHS): 0.24 K/UL
ABSOLUTE MONOCYTE (OHS): 0.4 K/UL (ref 0.3–1)
ABSOLUTE NEUTROPHIL COUNT (OHS): 1.05 K/UL (ref 1.8–7.7)
ALBUMIN SERPL BCP-MCNC: 4.3 G/DL (ref 3.5–5.2)
ALP SERPL-CCNC: 85 UNIT/L (ref 40–150)
ALT SERPL W/O P-5'-P-CCNC: 33 UNIT/L (ref 10–44)
ANION GAP (OHS): 7 MMOL/L (ref 8–16)
AST SERPL-CCNC: 36 UNIT/L (ref 11–45)
BASOPHILS # BLD AUTO: 0.04 K/UL
BASOPHILS NFR BLD AUTO: 1.1 %
BILIRUB SERPL-MCNC: 0.7 MG/DL (ref 0.1–1)
BUN SERPL-MCNC: 12 MG/DL (ref 8–23)
CALCIUM SERPL-MCNC: 8.9 MG/DL (ref 8.7–10.5)
CHLORIDE SERPL-SCNC: 107 MMOL/L (ref 95–110)
CHOLEST SERPL-MCNC: 145 MG/DL (ref 120–199)
CHOLEST/HDLC SERPL: 3.4 {RATIO} (ref 2–5)
CO2 SERPL-SCNC: 25 MMOL/L (ref 23–29)
CREAT SERPL-MCNC: 1.2 MG/DL (ref 0.5–1.4)
EAG (OHS): 114 MG/DL (ref 68–131)
ERYTHROCYTE [DISTWIDTH] IN BLOOD BY AUTOMATED COUNT: 16.7 % (ref 11.5–14.5)
GFR SERPLBLD CREATININE-BSD FMLA CKD-EPI: >60 ML/MIN/1.73/M2
GLUCOSE SERPL-MCNC: 87 MG/DL (ref 70–110)
HBA1C MFR BLD: 5.6 % (ref 4–5.6)
HCT VFR BLD AUTO: 39.5 % (ref 40–54)
HDLC SERPL-MCNC: 43 MG/DL (ref 40–75)
HDLC SERPL: 29.7 % (ref 20–50)
HGB BLD-MCNC: 12.3 GM/DL (ref 14–18)
IMM GRANULOCYTES # BLD AUTO: 0.01 K/UL (ref 0–0.04)
IMM GRANULOCYTES NFR BLD AUTO: 0.3 % (ref 0–0.5)
LDLC SERPL CALC-MCNC: 86.6 MG/DL (ref 63–159)
LYMPHOCYTES # BLD AUTO: 1.75 K/UL (ref 1–4.8)
MCH RBC QN AUTO: 23.3 PG (ref 27–31)
MCHC RBC AUTO-ENTMCNC: 31.1 G/DL (ref 32–36)
MCV RBC AUTO: 75 FL (ref 82–98)
NONHDLC SERPL-MCNC: 102 MG/DL
NUCLEATED RBC (/100WBC) (OHS): 1 /100 WBC
PLATELET # BLD AUTO: 245 K/UL (ref 150–450)
PMV BLD AUTO: 9.3 FL (ref 9.2–12.9)
POTASSIUM SERPL-SCNC: 4.5 MMOL/L (ref 3.5–5.1)
PROT SERPL-MCNC: 7.8 GM/DL (ref 6–8.4)
RBC # BLD AUTO: 5.27 M/UL (ref 4.6–6.2)
RELATIVE EOSINOPHIL (OHS): 6.9 %
RELATIVE LYMPHOCYTE (OHS): 50.1 % (ref 18–48)
RELATIVE MONOCYTE (OHS): 11.5 % (ref 4–15)
RELATIVE NEUTROPHIL (OHS): 30.1 % (ref 38–73)
SODIUM SERPL-SCNC: 139 MMOL/L (ref 136–145)
TRIGL SERPL-MCNC: 77 MG/DL (ref 30–150)
TSH SERPL-ACNC: 1.2 UIU/ML (ref 0.4–4)
WBC # BLD AUTO: 3.49 K/UL (ref 3.9–12.7)

## 2025-08-11 PROCEDURE — 1160F RVW MEDS BY RX/DR IN RCRD: CPT | Mod: CPTII,HCNC,S$GLB, | Performed by: INTERNAL MEDICINE

## 2025-08-11 PROCEDURE — 99999 PR PBB SHADOW E&M-EST. PATIENT-LVL III: CPT | Mod: PBBFAC,HCNC,, | Performed by: INTERNAL MEDICINE

## 2025-08-11 PROCEDURE — 1157F ADVNC CARE PLAN IN RCRD: CPT | Mod: CPTII,HCNC,S$GLB, | Performed by: INTERNAL MEDICINE

## 2025-08-11 PROCEDURE — 3288F FALL RISK ASSESSMENT DOCD: CPT | Mod: CPTII,HCNC,S$GLB, | Performed by: INTERNAL MEDICINE

## 2025-08-11 PROCEDURE — 80061 LIPID PANEL: CPT | Mod: HCNC

## 2025-08-11 PROCEDURE — 83036 HEMOGLOBIN GLYCOSYLATED A1C: CPT | Mod: HCNC

## 2025-08-11 PROCEDURE — 99215 OFFICE O/P EST HI 40 MIN: CPT | Mod: HCNC,S$GLB,, | Performed by: INTERNAL MEDICINE

## 2025-08-11 PROCEDURE — 84443 ASSAY THYROID STIM HORMONE: CPT | Mod: HCNC

## 2025-08-11 PROCEDURE — 3077F SYST BP >= 140 MM HG: CPT | Mod: CPTII,HCNC,S$GLB, | Performed by: INTERNAL MEDICINE

## 2025-08-11 PROCEDURE — 82040 ASSAY OF SERUM ALBUMIN: CPT | Mod: HCNC

## 2025-08-11 PROCEDURE — G0009 ADMIN PNEUMOCOCCAL VACCINE: HCPCS | Mod: HCNC,S$GLB,, | Performed by: INTERNAL MEDICINE

## 2025-08-11 PROCEDURE — 85025 COMPLETE CBC W/AUTO DIFF WBC: CPT | Mod: HCNC

## 2025-08-11 PROCEDURE — 3079F DIAST BP 80-89 MM HG: CPT | Mod: CPTII,HCNC,S$GLB, | Performed by: INTERNAL MEDICINE

## 2025-08-11 PROCEDURE — 1125F AMNT PAIN NOTED PAIN PRSNT: CPT | Mod: CPTII,HCNC,S$GLB, | Performed by: INTERNAL MEDICINE

## 2025-08-11 PROCEDURE — G2211 COMPLEX E/M VISIT ADD ON: HCPCS | Mod: HCNC,S$GLB,, | Performed by: INTERNAL MEDICINE

## 2025-08-11 PROCEDURE — 3008F BODY MASS INDEX DOCD: CPT | Mod: CPTII,HCNC,S$GLB, | Performed by: INTERNAL MEDICINE

## 2025-08-11 PROCEDURE — 1101F PT FALLS ASSESS-DOCD LE1/YR: CPT | Mod: CPTII,HCNC,S$GLB, | Performed by: INTERNAL MEDICINE

## 2025-08-11 PROCEDURE — 36415 COLL VENOUS BLD VENIPUNCTURE: CPT | Mod: HCNC

## 2025-08-11 PROCEDURE — 90677 PCV20 VACCINE IM: CPT | Mod: HCNC,S$GLB,, | Performed by: INTERNAL MEDICINE

## 2025-08-11 PROCEDURE — 1159F MED LIST DOCD IN RCRD: CPT | Mod: CPTII,HCNC,S$GLB, | Performed by: INTERNAL MEDICINE

## 2025-08-11 RX ORDER — AMLODIPINE BESYLATE 5 MG/1
5 TABLET ORAL
Qty: 90 TABLET | Refills: 0 | OUTPATIENT
Start: 2025-08-11

## 2025-08-11 RX ORDER — AMLODIPINE BESYLATE 10 MG/1
10 TABLET ORAL DAILY
Qty: 90 TABLET | Refills: 3 | Status: SHIPPED | OUTPATIENT
Start: 2025-08-11 | End: 2026-08-11

## 2025-08-12 ENCOUNTER — OFFICE VISIT (OUTPATIENT)
Dept: PODIATRY | Facility: CLINIC | Age: 70
End: 2025-08-12
Payer: MEDICARE

## 2025-08-12 VITALS
SYSTOLIC BLOOD PRESSURE: 149 MMHG | HEIGHT: 66 IN | HEART RATE: 86 BPM | BODY MASS INDEX: 31.07 KG/M2 | WEIGHT: 193.31 LBS | DIASTOLIC BLOOD PRESSURE: 78 MMHG

## 2025-08-12 DIAGNOSIS — M24.573 EQUINUS CONTRACTURE OF ANKLE: ICD-10-CM

## 2025-08-12 DIAGNOSIS — Z76.0 ENCOUNTER FOR MEDICATION REFILL: ICD-10-CM

## 2025-08-12 DIAGNOSIS — M79.671 FOOT PAIN, BILATERAL: ICD-10-CM

## 2025-08-12 DIAGNOSIS — L84 CORN OR CALLUS: ICD-10-CM

## 2025-08-12 DIAGNOSIS — M20.5X9 ADDUCTOVARUS ROTATION OF TOE, ACQUIRED, UNSPECIFIED LATERALITY: Primary | ICD-10-CM

## 2025-08-12 DIAGNOSIS — M79.672 FOOT PAIN, BILATERAL: ICD-10-CM

## 2025-08-12 DIAGNOSIS — M72.2 PLANTAR FASCIITIS: ICD-10-CM

## 2025-08-12 DIAGNOSIS — I10 PRIMARY HYPERTENSION: ICD-10-CM

## 2025-08-12 PROCEDURE — 99999 PR PBB SHADOW E&M-EST. PATIENT-LVL III: CPT | Mod: PBBFAC,HCNC,, | Performed by: PODIATRIST

## 2025-08-12 RX ORDER — UREA 40 %
CREAM (GRAM) TOPICAL DAILY
Qty: 28 G | Refills: 11 | Status: SHIPPED | OUTPATIENT
Start: 2025-08-12

## 2025-08-12 RX ORDER — DICLOFENAC SODIUM 10 MG/G
2 GEL TOPICAL 4 TIMES DAILY
Qty: 100 G | Refills: 2 | Status: SHIPPED | OUTPATIENT
Start: 2025-08-12 | End: 2025-08-14

## 2025-08-12 RX ORDER — AMLODIPINE BESYLATE 5 MG/1
5 TABLET ORAL
Qty: 90 TABLET | Refills: 0 | OUTPATIENT
Start: 2025-08-12

## 2025-08-13 ENCOUNTER — TELEPHONE (OUTPATIENT)
Dept: INTERNAL MEDICINE | Facility: CLINIC | Age: 70
End: 2025-08-13
Payer: MEDICARE

## 2025-08-14 RX ORDER — MELOXICAM 15 MG/1
15 TABLET ORAL DAILY
Qty: 30 TABLET | Refills: 0 | Status: SHIPPED | OUTPATIENT
Start: 2025-08-14

## 2025-09-04 DIAGNOSIS — I10 PRIMARY HYPERTENSION: ICD-10-CM

## 2025-09-04 DIAGNOSIS — Z76.0 ENCOUNTER FOR MEDICATION REFILL: ICD-10-CM

## 2025-09-04 RX ORDER — AMLODIPINE BESYLATE 5 MG/1
5 TABLET ORAL
Qty: 90 TABLET | Refills: 1 | Status: SHIPPED | OUTPATIENT
Start: 2025-09-04

## (undated) DEVICE — GLOVE SURGEON SYN PF SZ 9

## (undated) DEVICE — GLOVE BIOGEL SKINSENSE PI 7.5

## (undated) DEVICE — BRUSH SCRUB SURGICALW/BETADINE

## (undated) DEVICE — APPLICATOR CHLORAPREP ORN 26ML

## (undated) DEVICE — SEE MEDLINE ITEM 157169

## (undated) DEVICE — Device

## (undated) DEVICE — ELECTRODE REM PLYHSV RETURN 9

## (undated) DEVICE — SUT MCRYL PLUS 4-0 PS2 27IN

## (undated) DEVICE — BAG URINE DRAIN LATEX FREE

## (undated) DEVICE — UNDERGLOVES BIOGEL PI SIZE 7.5

## (undated) DEVICE — PAD ELECTRODE STER 1.5X3

## (undated) DEVICE — SOL IRR NACL .9% 3000ML

## (undated) DEVICE — SET CYSTO IRRIGATION UNIV SPIK

## (undated) DEVICE — SYR 10CC LUER LOCK

## (undated) DEVICE — PAD ABD 8X10 STERILE

## (undated) DEVICE — GLOVE BIOGEL SKINSENSE PI 7.0

## (undated) DEVICE — GAUZE SPONGE 4X4 12PLY

## (undated) DEVICE — NDL HYPO REG 25G X 1 1/2

## (undated) DEVICE — DRESSING XEROFORM FOIL PK 1X8

## (undated) DEVICE — PAD KNEE POLAR XL

## (undated) DEVICE — ADHESIVE MASTISOL VIAL 48/BX

## (undated) DEVICE — DRESSING GAUZE 6PLY 4X4

## (undated) DEVICE — PAD COLD THERAPY KNEE WRAP ON

## (undated) DEVICE — GOWN SMARTGOWN LVL4 X-LONG XL

## (undated) DEVICE — SYR B-D DISP CONTROL 10CC100/C

## (undated) DEVICE — SOL 9P NACL IRR PIC IL

## (undated) DEVICE — CLOSURE SKIN STERI STRIP 1/2X4

## (undated) DEVICE — GOWN SMART IMP BREATHABLE XXLG

## (undated) DEVICE — SOL IRR WATER STRL 3000 ML

## (undated) DEVICE — GLOVE ORTHO PF SZ 8.5

## (undated) DEVICE — TUBE SET INFLOW/OUTFLOW

## (undated) DEVICE — COVER MAYO STAND REINFRCD 30

## (undated) DEVICE — BLADE 4.2MM PREBENT ULTRACUT